# Patient Record
Sex: FEMALE | Race: WHITE | NOT HISPANIC OR LATINO | Employment: OTHER | URBAN - METROPOLITAN AREA
[De-identification: names, ages, dates, MRNs, and addresses within clinical notes are randomized per-mention and may not be internally consistent; named-entity substitution may affect disease eponyms.]

---

## 2017-01-06 ENCOUNTER — APPOINTMENT (OUTPATIENT)
Dept: GENERAL RADIOLOGY | Facility: HOSPITAL | Age: 72
End: 2017-01-06

## 2017-01-06 ENCOUNTER — OFFICE VISIT (OUTPATIENT)
Dept: FAMILY MEDICINE CLINIC | Facility: CLINIC | Age: 72
End: 2017-01-06

## 2017-01-06 ENCOUNTER — HOSPITAL ENCOUNTER (EMERGENCY)
Facility: HOSPITAL | Age: 72
Discharge: HOME | End: 2017-01-06
Attending: EMERGENCY MEDICINE | Admitting: EMERGENCY MEDICINE

## 2017-01-06 VITALS
BODY MASS INDEX: 27.99 KG/M2 | DIASTOLIC BLOOD PRESSURE: 64 MMHG | OXYGEN SATURATION: 100 % | RESPIRATION RATE: 16 BRPM | HEART RATE: 74 BPM | SYSTOLIC BLOOD PRESSURE: 155 MMHG | TEMPERATURE: 98 F | WEIGHT: 168 LBS | HEIGHT: 65 IN

## 2017-01-06 VITALS
DIASTOLIC BLOOD PRESSURE: 72 MMHG | OXYGEN SATURATION: 96 % | HEART RATE: 112 BPM | SYSTOLIC BLOOD PRESSURE: 120 MMHG | WEIGHT: 168 LBS | BODY MASS INDEX: 28.68 KG/M2 | HEIGHT: 64 IN | TEMPERATURE: 98.6 F

## 2017-01-06 DIAGNOSIS — E78.49 OTHER HYPERLIPIDEMIA: ICD-10-CM

## 2017-01-06 DIAGNOSIS — I10 ESSENTIAL HYPERTENSION: ICD-10-CM

## 2017-01-06 DIAGNOSIS — I48.91 NEW ONSET A-FIB (HCC): Primary | ICD-10-CM

## 2017-01-06 DIAGNOSIS — M79.7 FIBROMYALGIA: Primary | ICD-10-CM

## 2017-01-06 DIAGNOSIS — I48.91 ATRIAL FIBRILLATION WITH RAPID VENTRICULAR RESPONSE (HCC): ICD-10-CM

## 2017-01-06 LAB
ALBUMIN SERPL-MCNC: 4 G/DL (ref 3.5–5.2)
ALBUMIN/GLOB SERPL: 1.5 G/DL
ALP SERPL-CCNC: 110 U/L (ref 39–117)
ALT SERPL W P-5'-P-CCNC: 15 U/L (ref 1–33)
ANION GAP SERPL CALCULATED.3IONS-SCNC: 11.5 MMOL/L
APTT PPP: 32.3 SECONDS (ref 22.7–35.4)
AST SERPL-CCNC: 16 U/L (ref 1–32)
BASOPHILS # BLD AUTO: 0.05 10*3/MM3 (ref 0–0.2)
BASOPHILS NFR BLD AUTO: 0.6 % (ref 0–1.5)
BILIRUB SERPL-MCNC: 0.7 MG/DL (ref 0.1–1.2)
BUN BLD-MCNC: 12 MG/DL (ref 8–23)
BUN/CREAT SERPL: 11 (ref 7–25)
CALCIUM SPEC-SCNC: 9.4 MG/DL (ref 8.6–10.5)
CHLORIDE SERPL-SCNC: 103 MMOL/L (ref 98–107)
CO2 SERPL-SCNC: 25.5 MMOL/L (ref 22–29)
CREAT BLD-MCNC: 1.09 MG/DL (ref 0.57–1)
DEPRECATED RDW RBC AUTO: 46.8 FL (ref 37–54)
EOSINOPHIL # BLD AUTO: 0.18 10*3/MM3 (ref 0–0.7)
EOSINOPHIL NFR BLD AUTO: 2.2 % (ref 0.3–6.2)
ERYTHROCYTE [DISTWIDTH] IN BLOOD BY AUTOMATED COUNT: 13.6 % (ref 11.7–13)
GFR SERPL CREATININE-BSD FRML MDRD: 50 ML/MIN/1.73
GLOBULIN UR ELPH-MCNC: 2.7 GM/DL
GLUCOSE BLD-MCNC: 131 MG/DL (ref 65–99)
HCT VFR BLD AUTO: 46.4 % (ref 35.6–45.5)
HGB BLD-MCNC: 15.4 G/DL (ref 11.9–15.5)
HOLD SPECIMEN: NORMAL
HOLD SPECIMEN: NORMAL
IMM GRANULOCYTES # BLD: 0.02 10*3/MM3 (ref 0–0.03)
IMM GRANULOCYTES NFR BLD: 0.2 % (ref 0–0.5)
INR PPP: 1 (ref 0.9–1.1)
LYMPHOCYTES # BLD AUTO: 1.96 10*3/MM3 (ref 0.9–4.8)
LYMPHOCYTES NFR BLD AUTO: 24.1 % (ref 19.6–45.3)
MAGNESIUM SERPL-MCNC: 2 MG/DL (ref 1.6–2.4)
MCH RBC QN AUTO: 31.5 PG (ref 26.9–32)
MCHC RBC AUTO-ENTMCNC: 33.2 G/DL (ref 32.4–36.3)
MCV RBC AUTO: 94.9 FL (ref 80.5–98.2)
MONOCYTES # BLD AUTO: 0.43 10*3/MM3 (ref 0.2–1.2)
MONOCYTES NFR BLD AUTO: 5.3 % (ref 5–12)
NEUTROPHILS # BLD AUTO: 5.48 10*3/MM3 (ref 1.9–8.1)
NEUTROPHILS NFR BLD AUTO: 67.6 % (ref 42.7–76)
NT-PROBNP SERPL-MCNC: 849.5 PG/ML (ref 5–900)
PLATELET # BLD AUTO: 191 10*3/MM3 (ref 140–500)
PMV BLD AUTO: 11.2 FL (ref 6–12)
POTASSIUM BLD-SCNC: 4.5 MMOL/L (ref 3.5–5.2)
PROT SERPL-MCNC: 6.7 G/DL (ref 6–8.5)
PROTHROMBIN TIME: 12.8 SECONDS (ref 11.7–14.2)
RBC # BLD AUTO: 4.89 10*6/MM3 (ref 3.9–5.2)
SODIUM BLD-SCNC: 140 MMOL/L (ref 136–145)
T4 FREE SERPL-MCNC: 1.05 NG/DL (ref 0.93–1.7)
TROPONIN T SERPL-MCNC: <0.01 NG/ML (ref 0–0.03)
TSH SERPL DL<=0.05 MIU/L-ACNC: 0.92 MIU/ML (ref 0.27–4.2)
WBC NRBC COR # BLD: 8.12 10*3/MM3 (ref 4.5–10.7)
WHOLE BLOOD HOLD SPECIMEN: NORMAL
WHOLE BLOOD HOLD SPECIMEN: NORMAL

## 2017-01-06 PROCEDURE — 80053 COMPREHEN METABOLIC PANEL: CPT | Performed by: EMERGENCY MEDICINE

## 2017-01-06 PROCEDURE — 93005 ELECTROCARDIOGRAM TRACING: CPT

## 2017-01-06 PROCEDURE — 93000 ELECTROCARDIOGRAM COMPLETE: CPT | Performed by: PHYSICIAN ASSISTANT

## 2017-01-06 PROCEDURE — 83735 ASSAY OF MAGNESIUM: CPT | Performed by: EMERGENCY MEDICINE

## 2017-01-06 PROCEDURE — 83880 ASSAY OF NATRIURETIC PEPTIDE: CPT | Performed by: EMERGENCY MEDICINE

## 2017-01-06 PROCEDURE — 99284 EMERGENCY DEPT VISIT MOD MDM: CPT

## 2017-01-06 PROCEDURE — 85730 THROMBOPLASTIN TIME PARTIAL: CPT | Performed by: EMERGENCY MEDICINE

## 2017-01-06 PROCEDURE — 96372 THER/PROPH/DIAG INJ SC/IM: CPT

## 2017-01-06 PROCEDURE — 85610 PROTHROMBIN TIME: CPT | Performed by: EMERGENCY MEDICINE

## 2017-01-06 PROCEDURE — 71010 HC CHEST PA OR AP: CPT

## 2017-01-06 PROCEDURE — 93010 ELECTROCARDIOGRAM REPORT: CPT | Performed by: INTERNAL MEDICINE

## 2017-01-06 PROCEDURE — 84439 ASSAY OF FREE THYROXINE: CPT | Performed by: EMERGENCY MEDICINE

## 2017-01-06 PROCEDURE — 85025 COMPLETE CBC W/AUTO DIFF WBC: CPT | Performed by: EMERGENCY MEDICINE

## 2017-01-06 PROCEDURE — 96365 THER/PROPH/DIAG IV INF INIT: CPT

## 2017-01-06 PROCEDURE — 99214 OFFICE O/P EST MOD 30 MIN: CPT | Performed by: PHYSICIAN ASSISTANT

## 2017-01-06 PROCEDURE — 84443 ASSAY THYROID STIM HORMONE: CPT | Performed by: EMERGENCY MEDICINE

## 2017-01-06 PROCEDURE — 25010000002 ENOXAPARIN PER 10 MG: Performed by: EMERGENCY MEDICINE

## 2017-01-06 PROCEDURE — 84484 ASSAY OF TROPONIN QUANT: CPT | Performed by: EMERGENCY MEDICINE

## 2017-01-06 RX ORDER — PRAVASTATIN SODIUM 80 MG/1
80 TABLET ORAL DAILY
Qty: 30 TABLET | Refills: 11 | Status: SHIPPED | OUTPATIENT
Start: 2017-01-06 | End: 2017-08-04

## 2017-01-06 RX ORDER — SODIUM CHLORIDE 0.9 % (FLUSH) 0.9 %
10 SYRINGE (ML) INJECTION AS NEEDED
Status: DISCONTINUED | OUTPATIENT
Start: 2017-01-06 | End: 2017-01-06 | Stop reason: HOSPADM

## 2017-01-06 RX ORDER — METOPROLOL SUCCINATE 50 MG/1
50 TABLET, EXTENDED RELEASE ORAL DAILY
Qty: 30 TABLET | Refills: 0 | Status: SHIPPED | OUTPATIENT
Start: 2017-01-06 | End: 2017-02-02 | Stop reason: SDUPTHER

## 2017-01-06 RX ADMIN — ENOXAPARIN SODIUM 80 MG: 80 INJECTION SUBCUTANEOUS at 18:58

## 2017-01-06 RX ADMIN — DILTIAZEM HYDROCHLORIDE 5 MG/HR: 100 INJECTION, POWDER, LYOPHILIZED, FOR SOLUTION INTRAVENOUS at 18:58

## 2017-01-06 NOTE — ED NOTES
"Pt sent to ED from Yuki Pennington' office for abnormal EKG, denies CP, states \"I'm always soa\"     Alison Tang, PATRICK  01/06/17 3034    "

## 2017-01-06 NOTE — MR AVS SNAPSHOT
Miguelina Mueller   1/6/2017 3:30 PM   Office Visit    Provider:  Yuki Pennington PA-C   Department:  [facility]   Dept Phone:  [phone]                Your Full Care Plan              Where to Get Your Medications      These medications were sent to [facility] - [place], [place] - [address] [facility] - [phone]  - [phone]   [address] [facility], [facility] [address]     Phone:  [phone]     pravastatin 80 MG tablet            Your Updated Medication List          This list is accurate as of: 1/6/17  5:05 PM.  Always use your most recent med list.                DULoxetine 60 MG capsule   Commonly known as:  CYMBALTA   Take 1 capsule by mouth Daily. For anxiety and fibromyalgia       lisinopril 10 MG tablet   Commonly known as:  PRINIVIL,ZESTRIL   Take 1 tablet by mouth Daily. For BP       pravastatin 80 MG tablet   Commonly known as:  PRAVACHOL   Take 1 tablet by mouth Daily. For cholesterol       traMADol 50 MG tablet   Commonly known as:  ULTRAM   Take 1 tablet by mouth Every 6 (Six) Hours As Needed for moderate pain (4-6).       Vitamin D3 2000 UNITS capsule   Take one po daily               We Performed the Following     ECG 12 Lead       You Were Diagnosed With        Codes Comments    Fibromyalgia    -  Primary ICD-10-CM: M79.7  ICD-9-CM: 729.1     Essential hypertension     ICD-10-CM: I10  ICD-9-CM: 401.9     Other hyperlipidemia     ICD-10-CM: E78.4  ICD-9-CM: 272.4     Atrial fibrillation with rapid ventricular response     ICD-10-CM: I48.91  ICD-9-CM: 427.31       Instructions    Refer to ER now  I will send her by EMS or daughter  Warned patient that this medication can cause drowsiness and impair them operating machinery, including driving a car.  Caution is advised.         Patient Instructions History      MyChart Signup     [facility] [employer] allows you to send messages to your doctor, view your  "test results, renew your prescriptions, schedule appointments, and more. To sign up, go to [email or web address] and click on the Sign Up Now link in the New User? box. Enter your [employer] Activation Code exactly as it appears below along with the last four digits of your Social Security Number and your Date of Birth () to complete the sign-up process. If you do not sign up before the expiration date, you must request a new code.    [employer]fall Activation Code: [email or web address]  Expires: 2017  5:12 PM    If you have questions, you can email [email or web address] or call [phone] to talk to our [employer] staff. Remember, [employer]fall is NOT to be used for urgent needs. For medical emergencies, dial 911.               Other Info from Your Visit           Allergies     No Known Allergies      Reason for Visit     Hypertension           Vital Signs     Blood Pressure Pulse Temperature Height Weight Oxygen Saturation    120/72 (BP Location: Left arm, Patient Position: Sitting, Cuff Size: Large Adult) 112 98.6 °F (37 °C) (Oral) 64\" (162.6 cm) 168 lb (76.2 kg) 96%    Body Mass Index Smoking Status                28.84 kg/m2 Former Smoker          Problems and Diagnoses Noted     High blood pressure    Fibromyalgia    High cholesterol or triglycerides    Atrial fibrillation (irregular heartbeat)          "

## 2017-01-06 NOTE — ED NOTES
Pt reports going to primary care today for perscription renewal. Pt's MD sent her for further evaluation of new finding of atrial fib/flutter. Pt denies c/p or Soa.      Suly Castano RN  01/06/17 4891

## 2017-01-06 NOTE — PROGRESS NOTES
Subjective   Miguelina Mueller is a 71 y.o. female.     History of Present Illness   Miguelina Mueller 71 y.o. female who presents today for refill on Ultram for her Fibromyalgia  she has a history of   Patient Active Problem List   Diagnosis   • Anxiety   • Depression   • Fibromyalgia   • Hyperlipidemia   • Essential hypertension   • IFG (impaired fasting glucose)   • Renal insufficiency   • Shoulder bursitis   .    Lab Results   Component Value Date    GLUCOSE 90 07/23/2014    BUN 15 04/25/2016    CREATININE 1.07 (H) 04/25/2016    EGFRIFNONA 51 (L) 04/25/2016    EGFRIFAFRI 62 04/25/2016    BCR 14.0 04/25/2016    CO2 24.4 04/25/2016    CALCIUM 10.0 04/25/2016    PROTENTOTREF 6.8 04/25/2016    ALBUMIN 4.20 04/25/2016    LABIL2 1.6 04/25/2016    AST 18 04/25/2016    ALT 12 04/25/2016     She did BMP today and will update labs again in April    Blood pressure is controlled with medication.  No results found for: CHOL  Lab Results   Component Value Date    TRIG 158 (H) 04/25/2016     Lab Results   Component Value Date    HDL 56 04/25/2016     No results found for: LDLCALC  Lab Results   Component Value Date    LDL 93 04/25/2016     No results found for: HDLLDLRATIO  No components found for: CHOLHDL    I did listen to her and she has new onset Afib with rapid ventricular responses.  LCG cannot see her---too late to use acute clinic  Dr Dominguez and I discussed this.  She will have to proceed to ER.  She needs rate control under supervision.      ECG 12 Lead  Date/Time: 1/6/2017 4:00 PM  Performed by: HANNAH KUMARI  Authorized by: HANNAH KUMARI   Rhythm: atrial fibrillation and ventricular tachycardia  Rate: tachycardic  ST Segments: ST segments normal  T Waves: T waves normal  QRS axis: normal  Other: no other findings  Clinical impression: abnormal ECG  Comments: EKG Interpretation Report    Heart rate:    115 beats/min, OH interval:  ? msec, QRS duration:  70 msec  QTu:334 msec, QTc:  462 msec          The following portions  of the patient's history were reviewed and updated as appropriate: allergies, current medications, past family history, past medical history, past social history, past surgical history and problem list.    Review of Systems   Constitutional: Negative for activity change, appetite change and unexpected weight change.   HENT: Negative for nosebleeds and trouble swallowing.    Eyes: Negative for pain and visual disturbance.   Respiratory: Negative for chest tightness, shortness of breath and wheezing.    Cardiovascular: Negative for chest pain and palpitations.   Gastrointestinal: Negative for abdominal pain and blood in stool.   Endocrine: Negative.    Genitourinary: Negative for difficulty urinating and hematuria.   Musculoskeletal: Negative for joint swelling.   Skin: Negative for color change and rash.   Allergic/Immunologic: Negative.    Neurological: Negative for syncope and speech difficulty.   Hematological: Negative for adenopathy.   Psychiatric/Behavioral: Negative for agitation and confusion.   All other systems reviewed and are negative.      Objective   Physical Exam   Constitutional: She is oriented to person, place, and time. She appears well-developed and well-nourished. No distress.   HENT:   Head: Normocephalic and atraumatic.   Eyes: Conjunctivae and EOM are normal. Pupils are equal, round, and reactive to light. Right eye exhibits no discharge. Left eye exhibits no discharge. No scleral icterus.   Neck: Normal range of motion. Neck supple. No tracheal deviation present. No thyromegaly present.   Cardiovascular: Intact distal pulses and normal pulses.  Exam reveals no gallop.    No murmur heard.  Tachycardic and irreg   Pulmonary/Chest: Effort normal and breath sounds normal. No respiratory distress. She has no wheezes. She has no rales.   Musculoskeletal: Normal range of motion.   Neurological: She is alert and oriented to person, place, and time. She exhibits normal muscle tone. Coordination  normal.   Skin: Skin is warm. No rash noted. No erythema. No pallor.   Psychiatric: She has a normal mood and affect. Her behavior is normal. Judgment and thought content normal.   Nursing note and vitals reviewed.      Assessment/Plan   Miguelina was seen today for hypertension.    Diagnoses and all orders for this visit:    Fibromyalgia  -     traMADol (ULTRAM) 50 MG tablet; Take 1 tablet by mouth Every 6 (Six) Hours As Needed for moderate pain (4-6).    Essential hypertension    Other hyperlipidemia    Atrial fibrillation with rapid ventricular response  -     ECG 12 Lead    Other orders  -     pravastatin (PRAVACHOL) 80 MG tablet; Take 1 tablet by mouth Daily. For cholesterol

## 2017-01-06 NOTE — PATIENT INSTRUCTIONS
Refer to ER now  I will send her by EMS or daughter  Warned patient that this medication can cause drowsiness and impair them operating machinery, including driving a car.  Caution is advised.

## 2017-01-06 NOTE — ED PROVIDER NOTES
EMERGENCY DEPARTMENT ENCOUNTER    CHIEF COMPLAINT  Chief Complaint: abnormal EKG  History given by: patient  History limited by: nothing  Room Number: 19/19  PMD: Yuki Pennington PA-C      HPI:  Pt is a 71 y.o. female who presents from PMD (Dr. Pennington) for abnormal EKG (A-fib with RVR). Pt denies any acute symptoms. Pt reports chronic SOA , which has not changed recently. She denies fever, vomiting, diarrhea, and fever. She denies any hx of arrhthymias or similar episode in past. There are no other complaints at this time.    Duration:  EKG done earlier today  Location: PMD office  Quality: A-fib with RVR  Intensity/Severity: moderate  Associated Symptoms: none  Aggravating Factors: n/a  Alleviating Factors: n/a  Previous Episodes: no hx of arrhythmias  Treatment before arrival: EKG at Dr. Pennington' office (PMD)    PAST MEDICAL HISTORY  Active Ambulatory Problems     Diagnosis Date Noted   • Anxiety    • Depression    • Fibromyalgia    • Hyperlipidemia    • Essential hypertension    • IFG (impaired fasting glucose) 05/21/2014   • Renal insufficiency 06/04/2014   • Shoulder bursitis 05/19/2014     Resolved Ambulatory Problems     Diagnosis Date Noted   • No Resolved Ambulatory Problems     Past Medical History   Diagnosis Date   • ADD (attention deficit disorder)    • Encounter for eye exam    • Fibrocystic breast disease    • History of bone density study    • History of CT scan of abdomen 01/05/2011   • History of echocardiogram 09/29/2015   • History of exercise stress test 09/29/2015   • History of pelvic ultrasound 12/11/2009   • Hypertension    • Hypokalemia    • Postmenopausal    • Pulmonary nodule 10/2010   • Pyelonephritis 10/2010   • Sepsis 10/2010   • Thrombocytopenia 10/2010       PAST SURGICAL HISTORY  Past Surgical History   Procedure Laterality Date   • Rotator cuff repair Right 07/30/2014       FAMILY HISTORY  Family History   Problem Relation Age of Onset   • Heart disease Mother    • Thyroid disease  Mother    • Emphysema Father    • Cancer Brother    • Heart disease Brother        SOCIAL HISTORY  Social History     Social History   • Marital status:      Spouse name: N/A   • Number of children: N/A   • Years of education: N/A     Occupational History   • Not on file.     Social History Main Topics   • Smoking status: Former Smoker     Packs/day: 0.25   • Smokeless tobacco: Not on file   • Alcohol use Yes      Comment: rare   • Drug use: Not on file   • Sexual activity: Not on file     Other Topics Concern   • Not on file     Social History Narrative       ALLERGIES  Review of patient's allergies indicates no known allergies.    REVIEW OF SYSTEMS  Review of Systems   Constitutional: Negative for chills and fever.   HENT: Negative for sore throat.    Respiratory: Positive for shortness of breath (chronic).    Cardiovascular: Negative for chest pain.   Gastrointestinal: Negative for nausea and vomiting.   Genitourinary: Negative for dysuria.   Musculoskeletal: Negative for back pain.   Skin: Negative for rash.   Neurological: Negative for dizziness.   Psychiatric/Behavioral: The patient is not nervous/anxious.    All other systems reviewed and are negative.      PHYSICAL EXAM  ED Triage Vitals   Temp Heart Rate Resp BP SpO2   01/06/17 1701 01/06/17 1701 01/06/17 1701 01/06/17 1717 01/06/17 1701   98.4 °F (36.9 °C) 100 18 146/62 99 %      Temp src Heart Rate Source Patient Position BP Location FiO2 (%)   01/06/17 1701 01/06/17 1701 01/06/17 1717 01/06/17 1717 --   Tympanic Monitor Lying Left arm        Physical Exam   Constitutional: She is oriented to person, place, and time and well-developed, well-nourished, and in no distress. No distress.   HENT:   Head: Normocephalic and atraumatic.   Eyes: EOM are normal. Pupils are equal, round, and reactive to light.   Neck: Normal range of motion. Neck supple. No JVD present.   Cardiovascular: Normal heart sounds.  An irregularly irregular rhythm present.  Tachycardia present.    Pulmonary/Chest: Effort normal and breath sounds normal. No respiratory distress. She has no rales.   Abdominal: Soft. There is no tenderness. There is no rebound and no guarding.   Musculoskeletal: Normal range of motion. She exhibits no edema (no lower extremity edema ).   Neurological: She is alert and oriented to person, place, and time. She has normal sensation and normal strength.   Skin: Skin is warm and dry. No rash noted.   Psychiatric: Mood and affect normal.   Nursing note and vitals reviewed.      LAB RESULTS  Lab Results (last 24 hours)     Procedure Component Value Units Date/Time    CBC & Differential [[id number]] Collected:  01/06/17 1723    Specimen:  Blood Updated:  01/06/17 1736    Narrative:       The following orders were created for panel order CBC & Differential.  Procedure                               Abnormality         Status                     ---------                               -----------         ------                     CBC Auto Differential[[id number]]         Abnormal            Final result                 Please view results for these tests on the individual orders.    Comprehensive Metabolic Panel [[id number]]  (Abnormal) Collected:  01/06/17 1723    Specimen:  Blood Updated:  01/06/17 1753     Glucose 131 (H) mg/dL      BUN 12 mg/dL      Creatinine 1.09 (H) mg/dL      Sodium 140 mmol/L      Potassium 4.5 mmol/L      Chloride 103 mmol/L      CO2 25.5 mmol/L      Calcium 9.4 mg/dL      Total Protein 6.7 g/dL      Albumin 4.00 g/dL      ALT (SGPT) 15 U/L      AST (SGOT) 16 U/L      Alkaline Phosphatase 110 U/L      Total Bilirubin 0.7 mg/dL      eGFR Non African Amer 50 (L) mL/min/1.73      Globulin 2.7 gm/dL      A/G Ratio 1.5 g/dL      BUN/Creatinine Ratio 11.0      Anion Gap 11.5 mmol/L     Magnesium [[id number]]  (Normal) Collected:  01/06/17 1723    Specimen:  Blood Updated:  01/06/17 1753     Magnesium 2.0 mg/dL     Troponin [[id number]]  (Normal)  Collected:  01/06/17 1723    Specimen:  Blood Updated:  01/06/17 1753     Troponin T <0.010 ng/mL     Narrative:       Troponin T Reference Ranges:  Less than 0.03 ng/mL:    Negative for AMI  0.03 to 0.09 ng/mL:      Indeterminant for AMI  Greater than 0.09 ng/mL: Positive for AMI    CBC Auto Differential [[id number]]  (Abnormal) Collected:  01/06/17 1723    Specimen:  Blood Updated:  01/06/17 1736     WBC 8.12 10*3/mm3      RBC 4.89 10*6/mm3      Hemoglobin 15.4 g/dL      Hematocrit 46.4 (H) %      MCV 94.9 fL      MCH 31.5 pg      MCHC 33.2 g/dL      RDW 13.6 (H) %      RDW-SD 46.8 fl      MPV 11.2 fL      Platelets 191 10*3/mm3      Neutrophil % 67.6 %      Lymphocyte % 24.1 %      Monocyte % 5.3 %      Eosinophil % 2.2 %      Basophil % 0.6 %      Immature Grans % 0.2 %      Neutrophils, Absolute 5.48 10*3/mm3      Lymphocytes, Absolute 1.96 10*3/mm3      Monocytes, Absolute 0.43 10*3/mm3      Eosinophils, Absolute 0.18 10*3/mm3      Basophils, Absolute 0.05 10*3/mm3      Immature Grans, Absolute 0.02 10*3/mm3     BNP [[id number]]  (Normal) Collected:  01/06/17 1723    Specimen:  Blood Updated:  01/06/17 1819     proBNP 849.5 pg/mL     Narrative:       Among patients with dyspnea, NT-proBNP is highly sensitive for the detection of acute congestive heart failure. In addition NT-proBNP of <300 pg/ml effectively rules out acute congestive heart failure with 99% negative predictive value.    TSH [[id number]]  (Normal) Collected:  01/06/17 1723    Specimen:  Blood Updated:  01/06/17 1819     TSH 0.919 mIU/mL     T4, Free [[id number]]  (Normal) Collected:  01/06/17 1723    Specimen:  Blood Updated:  01/06/17 1819     Free T4 1.05 ng/dL     Protime-INR [[id number]]  (Normal) Collected:  01/06/17 1723    Specimen:  Blood Updated:  01/06/17 1827     Protime 12.8 Seconds      INR 1.00     aPTT [[id number]]  (Normal) Collected:  01/06/17 1723    Specimen:  Blood Updated:  01/06/17 1827     PTT 32.3 seconds           I ordered the  above labs and reviewed the results    RADIOLOGY  XR Chest 1 View   Preliminary Result   Negative chest x-ray.               I ordered the above noted radiological studies. Interpreted by radiologist. Reviewed by me in PACS.       PROCEDURES  Procedures    EKG  EKG time: 1704   Rhythm/Rate: A-fib, 105 BPM  QRS, axis: nml   ST and T waves: nml     Interpreted Contemporaneously by me, independently viewed  A-fib new compared to prior (7/23/14)      PROGRESS AND CONSULTS  ED Course     5:22 PM: Ordered troponin, magnesium blood work, CXR and EKG for further evaluation.    5:40 PM: Ordered BNP, APTT, and protime-INR for further evaluation. Ordered cardizem and Lovenox for heart rate    7:24 PM: Pt still in A-fib on monitor    7:30 PM:  BP: 116/87 HR: 88 Temp: 98.4 °F (36.9 °C) (Tympanic) O2 sat: 95% on RA  Rechecked pt. Pt is resting comfortably and appears in NAD. Notified pt that she will likely need to be admitted for further evaluation. D/w pt plan to consult cardiology     7:36 PM:  Discussed pt's case with Dr. Garcia (Cardiologist), who feels comfortable with patient going home on Toprol & Xarelto and following up in office    7:46 PM:  BP: 116/87 HR: 88 Temp: 98.4 °F (36.9 °C) (Tympanic) O2 sat: 93% on RA  Rechecked pt. Pt is resting comfortably and appears in NAD. Notified pt of consult with Dr. Garcia. D/w pt the risks/benefits of Eliquis versus Coumadin. Pt elects for Eliquis. Informed pt of plan for discharge with Toprolol and Eliquis along with office follow up with Dr. Garcia. Pt understands and agrees with the plan. All questions were answered.     MEDICAL DECISION MAKING  Results were reviewed/discussed with the patient and they were also made aware of online access. Pt also made aware that some labs, such as cultures, will not be resulted during ER visit and follow up with PMD is necessary.     MDM  Number of Diagnoses or Management Options  New onset a-fib:      Amount and/or Complexity of Data  Reviewed  Clinical lab tests: reviewed and ordered (Troponin=<0.01)  Tests in the radiology section of CPT®: reviewed and ordered (CXR is negative)  Tests in the medicine section of CPT®: reviewed and ordered (See EKG procedure note)  Review and summarize past medical records: yes (Pt visited Yuki Pennington PA-C earlier today. Pt had EKG shows new onset A-fib with RVR. Pt instructed to come to ED.)  Discuss the patient with other providers: yes (Dr. Garcia (Cardiologist))  Independent visualization of images, tracings, or specimens: yes           DIAGNOSIS  Final diagnoses:   New onset a-fib       DISPOSITION  DISCHARGE    Patient discharged in stable condition.    Reviewed implications of results, diagnosis, meds, responsibility to follow up, warning signs and symptoms of possible worsening, potential complications and reasons to return to ER.   Patient/Family voiced understanding of above instructions.    Discussed plan for discharge, as there is no emergent indication for admission.  Pt/family is agreeable and understands need for follow up and repeat testing.  Pt is aware that discharge does not mean that nothing is wrong but it indicates no emergency is present that requires admission and they must continue care with follow-up as given below or physician of their choice.     FOLLOW-UP  Nery Garcia MD  [address] [address]  [phone]    Call on 1/9/2017  to schedule an appointment         Medication List      New Prescriptions          metoprolol succinate XL 50 MG 24 hr tablet   Commonly known as:  TOPROL-XL   Take 1 tablet by mouth Daily. Take at bedtime       rivaroxaban 20 MG tablet   Commonly known as:  XARELTO   Take 1 tablet by mouth Daily.         Changed          Vitamin D3 2000 UNITS capsule   Take one po daily   What changed:    - how much to take  - how to take this  - when to take this  - additional instructions             Latest Documented Vital Signs:  As of 8:02  PM  BP- 116/87 HR- 88 Temp- 98.4 °F (36.9 °C) (Tympanic) O2 sat- 93%    --  Documentation assistance provided by kamila Zuniga for Dr. Mar.  Information recorded by the scribe was done at my direction and has been verified and validated by me.     Dameon Zuniga  01/06/17 2002       Bradford Mar MD  01/06/17 0687

## 2017-01-07 DIAGNOSIS — N28.9 RENAL INSUFFICIENCY: Primary | ICD-10-CM

## 2017-01-07 RX ORDER — TRAMADOL HYDROCHLORIDE 50 MG/1
50 TABLET ORAL EVERY 6 HOURS PRN
Qty: 120 TABLET | Refills: 2
Start: 2017-01-07 | End: 2017-03-27 | Stop reason: SDUPTHER

## 2017-01-07 NOTE — PROGRESS NOTES
Clinical Pharmacy Services: Medication History    Miguelina Mueller is a 71 y.o. female presenting to [facility] Emergency Department with chief complaint of: abnormal ECG      Past Medical History:  Past Medical History   Diagnosis Date   • ADD (attention deficit disorder)    • Anxiety    • Depression    • Encounter for eye exam      with dilation; never   • Fibrocystic breast disease    • Fibromyalgia    • History of bone density study      never   • History of CT scan of abdomen 01/05/2011     and pelvis; spasm in lower bowel   • History of echocardiogram 09/29/2015     diastolic dysfunction, grade 2   • History of exercise stress test 09/29/2015     neg   • History of pelvic ultrasound 12/11/2009     neg   • Hyperlipidemia    • Hypertension      benign essential   • Hypokalemia    • IFG (impaired fasting glucose) 05/21/2014   • Postmenopausal      natural   • Pulmonary nodule 10/2010     found incidentally on CT   • Pyelonephritis 10/2010     R kidney   • Renal insufficiency 06/04/2014   • Sepsis 10/2010     and bactermia secondary to acute pyelonephritis   • Shoulder bursitis 05/19/2014     right   • Thrombocytopenia 10/2010     due to septis but had resolved        No Known Allergies    Medication information was obtained from: Patient  Pharmacy and Phone Number: [facility] [phone]    Current Outpatient Medications:    Prior to Admission medications    Medication Sig Start Date End Date Taking? Authorizing Provider   Cholecalciferol (VITAMIN D3) 2000 UNITS capsule Take one po daily  Patient taking differently: Take 2,000 Units by mouth Daily. Take one po daily 10/12/16  Yes Yuki Pennington PA-C   DULoxetine (CYMBALTA) 60 MG capsule Take 1 capsule by mouth Daily. For anxiety and fibromyalgia 10/12/16  Yes Yuki Pennington PA-C   lisinopril (PRINIVIL,ZESTRIL) 10 MG tablet Take 1 tablet by mouth Daily. For BP 10/12/16  Yes Yuki Pennington PA-C   pravastatin (PRAVACHOL) 80 MG tablet Take 1 tablet by mouth Daily.  For cholesterol 1/6/17  Yes Yuki Pennington PA-C   traMADol (ULTRAM) 50 MG tablet Take 1 tablet by mouth Every 6 (Six) Hours As Needed for moderate pain (4-6). 10/13/16  Yes Anny Dominguez MD   metoprolol succinate XL (TOPROL-XL) 50 MG 24 hr tablet Take 1 tablet by mouth Daily. Take at bedtime 1/6/17   Bradford Mar MD   rivaroxaban (XARELTO) 20 MG tablet Take 1 tablet by mouth Daily. 1/6/17   Bradford Mar MD       This medication list is complete to the best of my knowledge as of 1/6/2017    Please call if questions.    Jacey Funk, pharmacy intern   PharmD Candidate 2017  Ascom Phone: [phone]

## 2017-01-08 NOTE — PROGRESS NOTES
I have reviewed the notes, assessments, and/or procedures performed by Yuki Pennington PA-C, I concur with her/his documentation of Miguelina Mueller.

## 2017-01-09 ENCOUNTER — TELEPHONE (OUTPATIENT)
Dept: FAMILY MEDICINE CLINIC | Facility: CLINIC | Age: 72
End: 2017-01-09

## 2017-01-09 ENCOUNTER — TELEPHONE (OUTPATIENT)
Dept: SOCIAL WORK | Facility: HOSPITAL | Age: 72
End: 2017-01-09

## 2017-01-09 NOTE — TELEPHONE ENCOUNTER
Patient seen you last week for A-Fib. You referred her to cardiology but Dr. Garcia can not see her until 1-. Is this ok?

## 2017-01-09 NOTE — TELEPHONE ENCOUNTER
ER F/U phone call: Pt states that she is doing well. Advised pt to take her b/p and heart rate daily, write it down and give to the cardiologist when she see them on 1-26-17. Discussed her new medications. No other questions or concerns voiced at this time. Carol Hankins RN

## 2017-01-12 ENCOUNTER — OFFICE VISIT (OUTPATIENT)
Dept: CARDIOLOGY | Facility: CLINIC | Age: 72
End: 2017-01-12

## 2017-01-12 VITALS
DIASTOLIC BLOOD PRESSURE: 80 MMHG | HEIGHT: 64 IN | BODY MASS INDEX: 28.68 KG/M2 | WEIGHT: 168 LBS | SYSTOLIC BLOOD PRESSURE: 120 MMHG | HEART RATE: 70 BPM

## 2017-01-12 DIAGNOSIS — R53.82 CHRONIC FATIGUE: ICD-10-CM

## 2017-01-12 DIAGNOSIS — I48.91 ATRIAL FIBRILLATION, UNSPECIFIED TYPE (HCC): Primary | ICD-10-CM

## 2017-01-12 DIAGNOSIS — I10 ESSENTIAL HYPERTENSION: ICD-10-CM

## 2017-01-12 DIAGNOSIS — R94.31 ABNORMAL EKG: ICD-10-CM

## 2017-01-12 PROCEDURE — 93000 ELECTROCARDIOGRAM COMPLETE: CPT | Performed by: NURSE PRACTITIONER

## 2017-01-12 PROCEDURE — 99214 OFFICE O/P EST MOD 30 MIN: CPT | Performed by: NURSE PRACTITIONER

## 2017-01-12 NOTE — PROGRESS NOTES
Date of Office Visit: 2017  Encounter Provider: ROXIE Fierro  Place of Service: [facility] CARDIOLOGY  Patient Name: Miguelina Mueller  :1945    Chief Complaint   Patient presents with   • Atrial Fibrillation   :     HPI: Miguelina Mueller is a 71 y.o. female who presents today for evaluation and treatment of new onset atrial fibrillation.  The patient was evaluated by Yuki Pennington PA-C on 2017 for follow-up of fibromyalgia.  She was noted to be in atrial fibrillation with rapid ventricular response with a heart rate of 115 bpm.  The patient was referred to go to the [facility] emergency department for further evaluation.  The patient reported to the ED physician chronic shortness of air which had not changed recently.  Upon review of the records, her comprehensive metabolic panel was normal except for an elevated glucose of 131 and creatinine of 1.09 (high).  Her magnesium level, troponin, TSH, and free T4 were within normal limits.  Her pro BNP was 849.5, which is considered to be within normal limits..  Chest x-ray interpreted as normal.  The patient was given IV Cardizem and Lovenox.  Her heart rate decreased to 88 bpm.  The plan of care was discussed with Dr. Nery Garcia who recommended the patient go home on Toprol XL 25 mg daily and Xarelto 20 mg daily.  She was then recommended to follow-up in our office for further evaluation.    The patient has a past medical history of anxiety, depression, and fibromyalgia.  She reports a history of essential hypertension, hyperlipidemia, and impaired fasting glucose - all well-controlled.  She also has a history of renal insufficiency.  Upon review of the patient's records, she had a treadmill exercise stress test completed at our office dated 2015 for evaluation of fatigue and shortness of air with the following results: Poor exercise capacity, negative EKG for ischemia, and Lomeli treadmill score of 2 which  is considered at moderate risk for coronary artery disease.  She also had a 2-D echocardiogram completed at the same time which showed the following: Ejection fraction 67%, grade 2 diastolic dysfunction, and no significant valvular stenosis or insufficiency.    The patient presents today for follow-up.  She is an established patient of Dr. Rajesh Dover and was last seen in September 2015.  The patient stopped her pravastatin at that time to see if it helped with muscle discomfort and it did not make a difference.  She is back on the pravastatin and doing fine.  She denies chest pain, shortness of air, paroxysmal nocturnal dyspnea, orthopnea, cough, palpitations, dizziness, or syncope.  She does have a tiny amount of lower extremity edema at times per the patient.  She often feels fatigued and this is unchanged.      Past Medical History   Diagnosis Date   • ADD (attention deficit disorder)    • Anxiety    • Depression    • Diastolic dysfunction      Grade 2 per 2-D echocardiogram 9/29/2015   • Encounter for eye exam      with dilation; never   • Fibrocystic breast disease    • Fibromyalgia    • History of bone density study      never   • History of CT scan of abdomen 01/05/2011     and pelvis; spasm in lower bowel   • History of echocardiogram 09/29/2015     diastolic dysfunction, grade 2   • History of exercise stress test 09/29/2015     neg   • History of pelvic ultrasound 12/11/2009     neg   • Hyperlipidemia    • Hypertension      benign essential   • Hypokalemia    • IFG (impaired fasting glucose) 05/21/2014   • Postmenopausal      natural   • Pulmonary nodule 10/2010     found incidentally on CT   • Pyelonephritis 10/2010     R kidney   • Renal insufficiency 06/04/2014   • Seasonal allergic rhinitis 5/14/2015   • Sepsis 10/2010     and bactermia secondary to acute pyelonephritis   • Shoulder bursitis 05/19/2014     right   • Thrombocytopenia 10/2010     due to septis but had resolved        Past Surgical  History   Procedure Laterality Date   • Rotator cuff repair Right 07/30/2014   • Colonoscopy         Social History     Social History   • Marital status:      Spouse name: N/A   • Number of children: N/A   • Years of education: N/A     Occupational History   • Not on file.     Social History Main Topics   • Smoking status: Former Smoker     Packs/day: 0.25   • Smokeless tobacco: Not on file      Comment: quit around 2007; caffeine use   • Alcohol use Yes      Comment: rare   • Drug use: No   • Sexual activity: Defer     Other Topics Concern   • Not on file     Social History Narrative       Family History   Problem Relation Age of Onset   • Heart disease Mother    • Thyroid disease Mother    • Emphysema Father    • Cancer Brother    • Heart disease Brother        Review of Systems   Constitution: Positive for malaise/fatigue. Negative for chills, diaphoresis, fever, night sweats, weight gain and weight loss.   HENT: Negative for hearing loss, nosebleeds, sore throat and tinnitus.    Eyes: Negative for blurred vision, double vision, pain and visual disturbance.   Cardiovascular: Positive for leg swelling. Negative for chest pain, claudication, cyanosis, dyspnea on exertion, irregular heartbeat, near-syncope, orthopnea, palpitations, paroxysmal nocturnal dyspnea and syncope.   Respiratory: Negative for cough, hemoptysis, shortness of breath, snoring and wheezing.    Endocrine: Negative for cold intolerance, heat intolerance and polyuria.   Hematologic/Lymphatic: Negative for bleeding problem. Does not bruise/bleed easily.   Skin: Negative for color change, dry skin, flushing and itching.   Musculoskeletal: Negative for falls, joint pain, joint swelling, muscle cramps, muscle weakness and myalgias.   Gastrointestinal: Negative for abdominal pain, constipation, heartburn, melena, nausea and vomiting.   Genitourinary: Negative for dysuria and hematuria.   Neurological: Negative for excessive daytime sleepiness,  "dizziness, light-headedness, loss of balance, numbness, paresthesias, seizures and vertigo.   Psychiatric/Behavioral: Negative for altered mental status, depression, memory loss and substance abuse. The patient does not have insomnia and is not nervous/anxious.    Allergic/Immunologic: Negative for environmental allergies.       No Known Allergies      Current Outpatient Prescriptions:   •  Cholecalciferol (VITAMIN D3) 2000 UNITS capsule, Take one po daily (Patient taking differently: Take 2,000 Units by mouth Daily. Take one po daily), Disp: 90 capsule, Rfl: 3  •  lisinopril (PRINIVIL,ZESTRIL) 10 MG tablet, Take 1 tablet by mouth Daily. For BP, Disp: 30 tablet, Rfl: 11  •  metoprolol succinate XL (TOPROL-XL) 50 MG 24 hr tablet, Take 1 tablet by mouth Daily. Take at bedtime, Disp: 30 tablet, Rfl: 0  •  pravastatin (PRAVACHOL) 80 MG tablet, Take 1 tablet by mouth Daily. For cholesterol, Disp: 30 tablet, Rfl: 11  •  rivaroxaban (XARELTO) 20 MG tablet, Take 1 tablet by mouth Daily., Disp: 30 tablet, Rfl: 0  •  traMADol (ULTRAM) 50 MG tablet, Take 1 tablet by mouth Every 6 (Six) Hours As Needed for moderate pain (4-6)., Disp: 120 tablet, Rfl: 2     Objective:     Vitals:    01/12/17 1045 01/12/17 1046   BP: 124/70 120/80   BP Location: Left arm Right arm   Pulse: 70    Weight: 168 lb (76.2 kg)    Height: 64\" (162.6 cm)      Body mass index is 28.84 kg/(m^2).    PHYSICAL EXAM:    Vitals Reviewed.   General Appearance: No acute distress, well developed and well nourished.   Eyes: Conjunctiva and lids: No erythema, swelling, or discharge. Sclera non-icteric.   HENT: Atraumatic, normocephalic. External eyes, ears, and nose normal. No hearing loss noted. Mucous membranes normal. Lips not cyanotic. Neck supple with no tenderness.  Respiratory: No signs of respiratory distress. Respiration rhythm and depth normal.   Clear to auscultation. No rales, crackles, rhonchi, or wheezing auscultated.   Cardiovascular:  Jugular Venous " Pressure: Normal  Heart Rate and Rhythm: Irregularly, irregular. Heart Sounds: Normal S1 and S2. No S3 or S4 noted.  Murmurs: No murmurs noted. No rubs, thrills, or gallops.   Arterial Pulses: Carotid pulses normal. No carotid bruit noted. Posterior tibialis and dorsalis pedis pulses normal.   Lower Extremities: No edema noted.  Gastrointestinal:  Abdomen soft, non-distended, non-tender. Normal bowel sounds. No hepatomegaly.   Musculoskeletal: Normal movement of extremities  Skin and Nails: General appearance normal. No pallor, cyanosis, diaphoresis. Skin temperature normal. No clubbing of fingernails.   Psychiatric: Patient alert and oriented to person, place, and time. Speech and behavior appropriate. Normal mood and affect.       ECG 12 Lead  Date/Time: 1/12/2017 11:36 AM  Performed by: KASIE COHEN  Authorized by: KASIE COHEN   Comparison: compared with previous ECG from 1/6/2017  Similar to previous ECG  Comparison to previous ECG:   Afib with   Rhythm: atrial fibrillation  Rate: normal  BPM: 70  Conduction: conduction normal  ST Segments: ST segments normal  T Waves: T waves normal  QRS axis: normal  Other: no other findings  Clinical impression: abnormal ECG              Assessment:       Diagnosis Plan   1. Atrial fibrillation, unspecified type  Adult Transthoracic Echo Complete    Holter Monitor - 24 Hour   2. Abnormal EKG  Adult Transthoracic Echo Complete    Holter Monitor - 24 Hour   3. Chronic fatigue  Adult Transthoracic Echo Complete    Holter Monitor - 24 Hour   4. Essential hypertension            Plan:       1. Atrial Fibrillation and Atrial Flutter  Assessment  • The patient has paroxysmal atrial fibrillation  • The patient's CHADS2-VASc score is 3  • A [id number]-QKTg score of 2 or more is considered a high risk for a thromboembolic event  • Rivaroxaban prescribed    Plan  • Continue in atrial fibrillation with rate control  • Continue rivaroxaban for antithrombotic therapy, bleeding  issues discussed  • Continue beta blocker for rhythm control    Subjective - Objective  •   The patient is asymptomatic at this time and doing fine.  We will continue with her current medication regimen.  I have recommended that she wear a 24-hour Holter monitor to make sure she is rate controlled and see if she still experiencing paroxysmal atrial fibrillation or if this is becoming more persistent.  She will remain on Xarelto for stroke prevention.  She denies any bleeding.  I have also recommended a 2-D echocardiogram to be completed to reassess her structural heart.    2.  Chronic Fatigue: The patient states this is unchanged.  3.  Essential Hypertension: Her blood pressure is well-controlled.    4.  Follow Up: I have recommended follow-up with Dr. Rajesh Dover in 4 weeks.      As always, it has been a pleasure to participate in your patient's care.      Sincerely,         ROXIE Wiggins

## 2017-01-12 NOTE — MR AVS SNAPSHOT
Miguelina Mueller   1/12/2017 10:40 AM   Office Visit    Dept Phone:  [phone]   Encounter #:  [id number]    Provider:  ROXIE Johnson   Department:  [facility] CARDIOLOGY                Your Full Care Plan              Today's Medication Changes          These changes are accurate as of: 1/12/17 11:59 AM.  If you have any questions, ask your nurse or doctor.               Stop taking medication(s)listed here:     DULoxetine 60 MG capsule   Commonly known as:  CYMBALTA   Stopped by:  ROXIE Johnson                      Your Updated Medication List          This list is accurate as of: 1/12/17 11:59 AM.  Always use your most recent med list.                lisinopril 10 MG tablet   Commonly known as:  PRINIVIL,ZESTRIL   Take 1 tablet by mouth Daily. For BP       metoprolol succinate XL 50 MG 24 hr tablet   Commonly known as:  TOPROL-XL   Take 1 tablet by mouth Daily. Take at bedtime       pravastatin 80 MG tablet   Commonly known as:  PRAVACHOL   Take 1 tablet by mouth Daily. For cholesterol       rivaroxaban 20 MG tablet   Commonly known as:  XARELTO   Take 1 tablet by mouth Daily.       traMADol 50 MG tablet   Commonly known as:  ULTRAM   Take 1 tablet by mouth Every 6 (Six) Hours As Needed for moderate pain (4-6).       Vitamin D3 2000 UNITS capsule   Take one po daily               We Performed the Following     Holter Monitor - 24 Hour       You Were Diagnosed With        Codes Comments    Atrial fibrillation, unspecified type    -  Primary ICD-10-CM: I48.91  ICD-9-CM: 427.31     Abnormal EKG     ICD-10-CM: R94.31  ICD-9-CM: 794.31     Chronic fatigue     ICD-10-CM: R53.82  ICD-9-CM: 780.79       Instructions     None    Patient Instructions History      Upcoming Appointments     Visit Type Date Time Department    HOSPITAL FOLLOW UP 1/12/2017 10:40 AM KYLE MCLAUGHLIN [facility] ANAMARIA ECHO 2D COMPLETE VT 1/20/2017  1:15 PM TIMOTHY MCLAUGHLIN ECHO    TIMOTHY CONRAD  HOLTER MONITOR 24 HOUR VT 2017  2:00 PM [facility] CARD HOLTERS    FOLLOW UP 2017  2:20 PM [facility] Signup     [facility] Certify allows you to send messages to your doctor, view your test results, renew your prescriptions, schedule appointments, and more. To sign up, go to [email or web address] and click on the Sign Up Now link in the New User? box. Enter your Certify Activation Code exactly as it appears below along with the last four digits of your Social Security Number and your Date of Birth () to complete the sign-up process. If you do not sign up before the expiration date, you must request a new code.    Certify Activation Code: [email or web address]  Expires: 2017  5:12 PM    If you have questions, you can email [email or web address] or call [phone] to talk to our Certify staff. Remember, Certify is NOT to be used for urgent needs. For medical emergencies, dial 911.               Other Info from Your Visit           Your Appointments     2017  1:15 PM EST   ECHOCARDIOGRAM 2D COMPLETE VISIT with ANAMARIA EUGENEG ECHO/VAS FRONT [facility] OUTPATIENT ECHOCARDIOGRAPHY ([place])    [address] [place], [place] [id number] [phone]           Bring your insurance cards with you. Wear comfortable clothing and shoes.            2017  2:00 PM EST   HOLTER MONITOR - 24 HOUR VISIT with ANAMARIA VALORIEG HOLTER   [facility] CARDIOLOGY ([facility])    [address] [place] [id number]               2017  2:20 PM EST   Follow Up with Rajesh Dover MD   [facility] CARDIOLOGY (--)    [address] [place] [id number] [phone]           Arrive 15 minutes prior to appointment.              Allergies     No Known Allergies      Reason for Visit     Atrial Fibrillation           Vital Signs     Blood Pressure Pulse Height Weight Body Mass Index Smoking  "Status    120/80 (BP Location: Right arm) 70 64\" (162.6 cm) 168 lb (76.2 kg) 28.84 kg/m2 Former Smoker      Problems and Diagnoses Noted     Seasonal allergic reaction    Atrial fibrillation (irregular heartbeat)    -  Primary    Abnormal EKG        Chronic fatigue            "

## 2017-01-20 ENCOUNTER — HOSPITAL ENCOUNTER (OUTPATIENT)
Dept: CARDIOLOGY | Facility: HOSPITAL | Age: 72
Discharge: HOME | End: 2017-01-20
Admitting: NURSE PRACTITIONER

## 2017-01-20 VITALS
HEART RATE: 66 BPM | HEIGHT: 64 IN | SYSTOLIC BLOOD PRESSURE: 124 MMHG | BODY MASS INDEX: 28.68 KG/M2 | WEIGHT: 168 LBS | DIASTOLIC BLOOD PRESSURE: 76 MMHG

## 2017-01-20 DIAGNOSIS — I48.91 ATRIAL FIBRILLATION, UNSPECIFIED TYPE (HCC): ICD-10-CM

## 2017-01-20 DIAGNOSIS — R94.31 ABNORMAL EKG: ICD-10-CM

## 2017-01-20 DIAGNOSIS — R53.82 CHRONIC FATIGUE: ICD-10-CM

## 2017-01-20 LAB
BH CV ECHO MEAS - ACS: 1.7 CM
BH CV ECHO MEAS - AO MAX PG (FULL): 2.6 MMHG
BH CV ECHO MEAS - AO MAX PG: 5.9 MMHG
BH CV ECHO MEAS - AO MEAN PG (FULL): 0.84 MMHG
BH CV ECHO MEAS - AO MEAN PG: 2.5 MMHG
BH CV ECHO MEAS - AO ROOT AREA (BSA CORRECTED): 1.5
BH CV ECHO MEAS - AO ROOT AREA: 5.8 CM^2
BH CV ECHO MEAS - AO ROOT DIAM: 2.7 CM
BH CV ECHO MEAS - AO V2 MAX: 122 CM/SEC
BH CV ECHO MEAS - AO V2 MEAN: 68.6 CM/SEC
BH CV ECHO MEAS - AO V2 VTI: 25.6 CM
BH CV ECHO MEAS - AVA(I,A): 1.8 CM^2
BH CV ECHO MEAS - AVA(I,D): 1.8 CM^2
BH CV ECHO MEAS - AVA(V,A): 1.5 CM^2
BH CV ECHO MEAS - AVA(V,D): 1.5 CM^2
BH CV ECHO MEAS - BSA(HAYCOCK): 1.9 M^2
BH CV ECHO MEAS - BSA: 1.8 M^2
BH CV ECHO MEAS - BZI_BMI: 28.8 KILOGRAMS/M^2
BH CV ECHO MEAS - BZI_METRIC_HEIGHT: 162.6 CM
BH CV ECHO MEAS - BZI_METRIC_WEIGHT: 76.2 KG
BH CV ECHO MEAS - CONTRAST EF (2CH): 66.7 ML/M^2
BH CV ECHO MEAS - CONTRAST EF 4CH: 65.3 ML/M^2
BH CV ECHO MEAS - EDV(MOD-SP2): 51 ML
BH CV ECHO MEAS - EDV(MOD-SP4): 49 ML
BH CV ECHO MEAS - EDV(TEICH): 94 ML
BH CV ECHO MEAS - EF(CUBED): 87.5 %
BH CV ECHO MEAS - EF(MOD-SP2): 66.7 %
BH CV ECHO MEAS - EF(MOD-SP4): 65.3 %
BH CV ECHO MEAS - EF(TEICH): 81.4 %
BH CV ECHO MEAS - ESV(MOD-SP2): 17 ML
BH CV ECHO MEAS - ESV(MOD-SP4): 17 ML
BH CV ECHO MEAS - ESV(TEICH): 17.5 ML
BH CV ECHO MEAS - FS: 50 %
BH CV ECHO MEAS - IVS/LVPW: 1.1
BH CV ECHO MEAS - IVSD: 1.1 CM
BH CV ECHO MEAS - LAT PEAK E' VEL: 9 CM/SEC
BH CV ECHO MEAS - LV DIASTOLIC VOL/BSA (35-75): 27 ML/M^2
BH CV ECHO MEAS - LV MASS(C)D: 158.9 GRAMS
BH CV ECHO MEAS - LV MASS(C)DI: 87.5 GRAMS/M^2
BH CV ECHO MEAS - LV MAX PG: 3.3 MMHG
BH CV ECHO MEAS - LV MEAN PG: 1.7 MMHG
BH CV ECHO MEAS - LV SYSTOLIC VOL/BSA (12-30): 9.4 ML/M^2
BH CV ECHO MEAS - LV V1 MAX: 91.2 CM/SEC
BH CV ECHO MEAS - LV V1 MEAN: 60 CM/SEC
BH CV ECHO MEAS - LV V1 VTI: 23.9 CM
BH CV ECHO MEAS - LVIDD: 4.5 CM
BH CV ECHO MEAS - LVIDS: 2.3 CM
BH CV ECHO MEAS - LVLD AP2: 6.2 CM
BH CV ECHO MEAS - LVLD AP4: 6.4 CM
BH CV ECHO MEAS - LVLS AP2: 5.9 CM
BH CV ECHO MEAS - LVLS AP4: 6 CM
BH CV ECHO MEAS - LVOT AREA (M): 2 CM^2
BH CV ECHO MEAS - LVOT AREA: 2 CM^2
BH CV ECHO MEAS - LVOT DIAM: 1.6 CM
BH CV ECHO MEAS - LVPWD: 0.96 CM
BH CV ECHO MEAS - MED PEAK E' VEL: 9 CM/SEC
BH CV ECHO MEAS - MR MAX PG: 67.7 MMHG
BH CV ECHO MEAS - MR MAX VEL: 411.5 CM/SEC
BH CV ECHO MEAS - MV DEC SLOPE: 535.7 CM/SEC^2
BH CV ECHO MEAS - MV DEC TIME: 0.18 SEC
BH CV ECHO MEAS - MV E MAX VEL: 107.1 CM/SEC
BH CV ECHO MEAS - MV MAX PG: 5.1 MMHG
BH CV ECHO MEAS - MV MEAN PG: 1.8 MMHG
BH CV ECHO MEAS - MV P1/2T MAX VEL: 101.6 CM/SEC
BH CV ECHO MEAS - MV P1/2T: 55.6 MSEC
BH CV ECHO MEAS - MV V2 MAX: 112.7 CM/SEC
BH CV ECHO MEAS - MV V2 MEAN: 60.6 CM/SEC
BH CV ECHO MEAS - MV V2 VTI: 28.8 CM
BH CV ECHO MEAS - MVA P1/2T LCG: 2.2 CM^2
BH CV ECHO MEAS - MVA(P1/2T): 4 CM^2
BH CV ECHO MEAS - MVA(VTI): 1.6 CM^2
BH CV ECHO MEAS - PA MAX PG (FULL): 2.1 MMHG
BH CV ECHO MEAS - PA MAX PG: 3.3 MMHG
BH CV ECHO MEAS - PA V2 MAX: 90.9 CM/SEC
BH CV ECHO MEAS - PULM A REVS DUR: 0.11 SEC
BH CV ECHO MEAS - PULM A REVS VEL: 24.7 CM/SEC
BH CV ECHO MEAS - PULM DIAS VEL: 56.3 CM/SEC
BH CV ECHO MEAS - PULM S/D: 0.78
BH CV ECHO MEAS - PULM SYS VEL: 44.1 CM/SEC
BH CV ECHO MEAS - PVA(V,A): 1 CM^2
BH CV ECHO MEAS - PVA(V,D): 1 CM^2
BH CV ECHO MEAS - QP/QS: 0.46
BH CV ECHO MEAS - RAP SYSTOLE: 3 MMHG
BH CV ECHO MEAS - RV MAX PG: 1.2 MMHG
BH CV ECHO MEAS - RV MEAN PG: 0.64 MMHG
BH CV ECHO MEAS - RV V1 MAX: 54.8 CM/SEC
BH CV ECHO MEAS - RV V1 MEAN: 37.1 CM/SEC
BH CV ECHO MEAS - RV V1 VTI: 12.7 CM
BH CV ECHO MEAS - RVOT AREA: 1.7 CM^2
BH CV ECHO MEAS - RVOT DIAM: 1.5 CM
BH CV ECHO MEAS - RVSP: 37.2 MMHG
BH CV ECHO MEAS - SI(AO): 82.2 ML/M^2
BH CV ECHO MEAS - SI(CUBED): 44.9 ML/M^2
BH CV ECHO MEAS - SI(LVOT): 26.1 ML/M^2
BH CV ECHO MEAS - SI(MOD-SP2): 18.7 ML/M^2
BH CV ECHO MEAS - SI(MOD-SP4): 17.6 ML/M^2
BH CV ECHO MEAS - SI(TEICH): 42.2 ML/M^2
BH CV ECHO MEAS - SUP REN AO DIAM: 1.6 CM
BH CV ECHO MEAS - SV(AO): 149.2 ML
BH CV ECHO MEAS - SV(CUBED): 81.5 ML
BH CV ECHO MEAS - SV(LVOT): 47.3 ML
BH CV ECHO MEAS - SV(MOD-SP2): 34 ML
BH CV ECHO MEAS - SV(MOD-SP4): 32 ML
BH CV ECHO MEAS - SV(RVOT): 21.5 ML
BH CV ECHO MEAS - SV(TEICH): 76.6 ML
BH CV ECHO MEAS - TAPSE (>1.6): 2.4 CM2
BH CV ECHO MEAS - TR MAX VEL: 292.4 CM/SEC
BH CV XLRA - RV BASE: 2.7 CM
BH CV XLRA - TDI S': 12 CM/SEC
E/E' RATIO: 11
LEFT ATRIUM VOLUME INDEX: 28 ML/M2
LEFT ATRIUM VOLUME: 50 CM3

## 2017-01-20 PROCEDURE — 93306 TTE W/DOPPLER COMPLETE: CPT | Performed by: INTERNAL MEDICINE

## 2017-01-20 PROCEDURE — 93306 TTE W/DOPPLER COMPLETE: CPT

## 2017-01-23 ENCOUNTER — TELEPHONE (OUTPATIENT)
Dept: CARDIOLOGY | Facility: CLINIC | Age: 72
End: 2017-01-23

## 2017-01-23 NOTE — TELEPHONE ENCOUNTER
Echocardiogram results:    · Calculated EF = 65.3%  · All left ventricular wall segments contract normally.  · Left ventricular diastolic dysfunction (grade III) consistent with reversible restrictive pattern.  · Left atrial cavity size is mild-to-moderately dilated.  · Mild mitral valve regurgitation is present  · Mild tricuspid valve regurgitation is present.  · Mild mitral valve regurgitation is present  · Mild tricuspid valve regurgitation is present. Estimated right ventricular systolic pressure from tricuspid regurgitation is mildly elevated (35-45 mmHg).      Holter results:    · An abnormal monitor study.  · 7 beat run of an atrial arrhythmia noted at 11:57 PM.  · The dominant rhythm normal sinus rhythm  · Premature atrial contractions occured frequently 15.3% of total beats were APC  · Average HR: 64. Min HR: 44. Max HR: 126.  · No ventricular arrhythmias noted  · The patient did not report palpitations    I will further discuss with cardiologist and follow up with the patient.

## 2017-01-24 NOTE — TELEPHONE ENCOUNTER
I discussed the patient's plan of care with Dr. Rajesh Dover today.  She was noted to have grade 3 diastolic dysfunction with reversible restrictive pattern.  Her blood pressure was well-controlled in the office. Dr. Dover has recommended medical treatment.    Upon review of her Holter monitor she was predominantly in a normal sinus rhythm and rate control. She was noted to have frequent premature atrial contractions. He has recommended that she continue with the Xarelto therapy as she was noted to have atrial fibrillation in the past.    The patient is scheduled to follow-up with him on 2/8/17.    I just returned the patient's phone call.  I explained to her both test results and she verbalizes understanding.      She said that the Xarelto's will cost her $300 on a monthly basis.  I will leave samples for her at our  to be picked up tomorrow.  When she comes in  February,  I want her to discuss the cost of the medication with Dr. Dover and further recommendations will be made at that time.

## 2017-01-26 NOTE — TELEPHONE ENCOUNTER
Miguelina Mueller called today and wanted to thank you so much for letting her have samples  Xalrelto. You don't have any idea how much that helped.

## 2017-02-02 ENCOUNTER — OFFICE VISIT (OUTPATIENT)
Dept: CARDIOLOGY | Facility: CLINIC | Age: 72
End: 2017-02-02

## 2017-02-02 VITALS
SYSTOLIC BLOOD PRESSURE: 104 MMHG | DIASTOLIC BLOOD PRESSURE: 62 MMHG | RESPIRATION RATE: 18 BRPM | WEIGHT: 172 LBS | HEIGHT: 64 IN | BODY MASS INDEX: 29.37 KG/M2 | HEART RATE: 94 BPM

## 2017-02-02 DIAGNOSIS — I48.0 PAF (PAROXYSMAL ATRIAL FIBRILLATION) (HCC): Primary | ICD-10-CM

## 2017-02-02 DIAGNOSIS — I10 ESSENTIAL HYPERTENSION: ICD-10-CM

## 2017-02-02 PROCEDURE — 93000 ELECTROCARDIOGRAM COMPLETE: CPT | Performed by: INTERNAL MEDICINE

## 2017-02-02 PROCEDURE — 99214 OFFICE O/P EST MOD 30 MIN: CPT | Performed by: INTERNAL MEDICINE

## 2017-02-02 RX ORDER — METOPROLOL SUCCINATE 50 MG/1
50 TABLET, EXTENDED RELEASE ORAL DAILY
Qty: 90 TABLET | Refills: 3 | Status: SHIPPED | OUTPATIENT
Start: 2017-02-02 | End: 2017-02-16 | Stop reason: SDUPTHER

## 2017-02-02 RX ORDER — DULOXETIN HYDROCHLORIDE 60 MG/1
60 CAPSULE, DELAYED RELEASE ORAL DAILY
COMMUNITY
End: 2017-09-13 | Stop reason: SDUPTHER

## 2017-02-02 NOTE — PROGRESS NOTES
Subjective:     Encounter Date:02/02/2017      Patient ID: Miguelina Mueller is a 71 y.o. female.    Chief Complaint:  Atrial Fibrillation   Presents for follow-up visit. Symptoms are negative for chest pain, dizziness, hypertension, hypotension, pacemaker problem, palpitations, shortness of breath, syncope, tachycardia and weakness. The symptoms have been stable. Past medical history includes atrial fibrillation.   Hypertension   This is a chronic problem. The current episode started more than 1 year ago. The problem is controlled. Pertinent negatives include no chest pain, palpitations or shortness of breath.   Patient resents today for reevaluation.  Clinically she is doing great no palpitations shortness of breath edema lightheadedness or chest discomfort.    Review of Systems   Constitution: Negative for weakness.   Cardiovascular: Negative for chest pain, palpitations and syncope.   Respiratory: Negative for shortness of breath.    Neurological: Negative for dizziness.   All other systems reviewed and are negative.        ECG 12 Lead  Date/Time: 2/2/2017 2:28 PM  Performed by: KISHORE RUEDA  Authorized by: KISHORE RUEDA   Comparison: compared with previous ECG from 1/12/2017  Similar to previous ECG  Rhythm: atrial fibrillation  Clinical impression: abnormal ECG               Objective:     Physical Exam   Constitutional: She is oriented to person, place, and time. She appears well-developed.   HENT:   Head: Normocephalic.   Eyes: Conjunctivae are normal.   Neck: Normal range of motion.   Cardiovascular: Normal rate and normal heart sounds.  An irregularly irregular rhythm present.   Pulmonary/Chest: Breath sounds normal.   Abdominal: Soft. Bowel sounds are normal.   Musculoskeletal: Normal range of motion. She exhibits no edema.   Neurological: She is alert and oriented to person, place, and time.   Skin: Skin is warm and dry.   Psychiatric: She has a normal mood and affect. Her behavior is normal.    Vitals reviewed.    · Calculated EF = 65.3%  · All left ventricular wall segments contract normally.  · Left ventricular diastolic dysfunction (grade III) consistent with reversible restrictive pattern.  · Left atrial cavity size is mild-to-moderately dilated.  · Mild mitral valve regurgitation is present  · Mild tricuspid valve regurgitation is present.  · Mild mitral valve regurgitation is present  · Mild tricuspid valve regurgitation is present. Estimated right ventricular systolic pressure from tricuspid regurgitation is mildly elevated (35-45 mmHg).  Lab Review:       Assessment:          Diagnosis Plan   1. PAF (paroxysmal atrial fibrillation)  ECG 12 Lead   2. Essential hypertension            Plan:       1.  Paroxysmal atrial fibrillation.  Unfortunate that patient is back in atrial fibrillation today.  This will remain she needs long-term anticoagulation.  2.  Mild mitral regurgitation.  3.  We did discuss options since she is completely asymptomatic and doing well recommend no further changes at this time.    Atrial Fibrillation and Atrial Flutter  Assessment  • The patient has paroxysmal atrial fibrillation  • The patient's CHADS2-VASc score is 3  • A [id number] score of 2 or more is considered a high risk for a thromboembolic event  • Rivaroxaban prescribed    Plan  • Continue in atrial fibrillation with rate control  • Continue beta blocker for rate control

## 2017-02-04 ENCOUNTER — RESULTS ENCOUNTER (OUTPATIENT)
Dept: FAMILY MEDICINE CLINIC | Facility: CLINIC | Age: 72
End: 2017-02-04

## 2017-02-04 DIAGNOSIS — N28.9 RENAL INSUFFICIENCY: ICD-10-CM

## 2017-02-16 RX ORDER — METOPROLOL SUCCINATE 50 MG/1
50 TABLET, EXTENDED RELEASE ORAL DAILY
Qty: 90 TABLET | Refills: 3 | Status: SHIPPED | OUTPATIENT
Start: 2017-02-16 | End: 2017-08-03

## 2017-03-15 ENCOUNTER — TELEPHONE (OUTPATIENT)
Dept: CARDIOLOGY | Facility: CLINIC | Age: 72
End: 2017-03-15

## 2017-03-27 ENCOUNTER — OFFICE VISIT (OUTPATIENT)
Dept: FAMILY MEDICINE CLINIC | Facility: CLINIC | Age: 72
End: 2017-03-27

## 2017-03-27 VITALS
HEIGHT: 64 IN | HEART RATE: 70 BPM | TEMPERATURE: 97.7 F | RESPIRATION RATE: 16 BRPM | WEIGHT: 172 LBS | BODY MASS INDEX: 29.37 KG/M2 | OXYGEN SATURATION: 97 % | DIASTOLIC BLOOD PRESSURE: 70 MMHG | SYSTOLIC BLOOD PRESSURE: 110 MMHG

## 2017-03-27 DIAGNOSIS — M79.7 FIBROMYALGIA: Primary | ICD-10-CM

## 2017-03-27 DIAGNOSIS — F41.9 ANXIETY: ICD-10-CM

## 2017-03-27 DIAGNOSIS — F34.1 DYSTHYMIA: ICD-10-CM

## 2017-03-27 PROCEDURE — 99213 OFFICE O/P EST LOW 20 MIN: CPT | Performed by: PHYSICIAN ASSISTANT

## 2017-03-27 RX ORDER — TRAMADOL HYDROCHLORIDE 50 MG/1
50 TABLET ORAL EVERY 6 HOURS PRN
Qty: 120 TABLET | Refills: 2
Start: 2017-03-27 | End: 2017-06-21 | Stop reason: SDUPTHER

## 2017-03-27 NOTE — PATIENT INSTRUCTIONS
Warned patient that this medication can cause drowsiness and impair them operating machinery, including driving a car.  Caution is advised.  Low glycemic index diet  Exercise 30 minutes most days of the week  Make sure you get results on any labs or tests we ordered today  We discussed medications and how to take them as prescribed  Sleep 6-8 hours each night if possible  If you have not signed up for [email or web address], please activate your code ASAP from your After Visit Summary today    LDL goal <100  LDL goal if heart disease <70  HDL goal >60  Triglyceride goal <150  BP goal =<130/80  Fasting glucose <100

## 2017-03-27 NOTE — PROGRESS NOTES
Subjective   Miguelina Mueller is a 71 y.o. female.     History of Present Illness   Miguelina Mueller 71 y.o. female who presents today for routine follow up check and medication refills.  she has a history of   Patient Active Problem List   Diagnosis   • Anxiety   • Depression   • Fibromyalgia   • Hyperlipidemia   • Essential hypertension   • IFG (impaired fasting glucose)   • Renal insufficiency   • Shoulder bursitis   • Seasonal allergic rhinitis   .  Since the last visit, she has overall felt tired.  She has Atrial Fibrillation and remains under the care of their cardiologist for medical management.  she has been compliant with current medications have reviewed them.  The patient denies medication side effects.    She is on cymbalta and Ultram for Fibro pain  The Toprol makes her tired and will talk to DR Dover    Miguelina Mueller female 71 y.o. who presents today for follow up of Depression and Anxiety.  She reports medication is working well, patient desires to continue on Rx, and needs refill. Onset of symptoms was approximately several years ago.  She denies current suicidal and homicidal ideation. Risk factors are lifestyle of multiple roles and chronic pain.  Previous treatment includes current Rx.  She complains of the following medication side effects: none.  The patient declines to go to counseling..  The patient has read and signed the [facility] Controlled Substance Contract.  I will continue to see patient for regular follow up appointments.  They are well controlled on their medication.  NATA has been reviewed by me and is updated every 3 months. The patient is aware of the potential for addiction and dependence.      The following portions of the patient's history were reviewed and updated as appropriate: allergies, current medications, past family history, past medical history, past social history, past surgical history and problem list.    Review of Systems   Constitutional: Positive for fatigue.  Negative for activity change, appetite change and unexpected weight change.   HENT: Negative for nosebleeds and trouble swallowing.    Eyes: Negative for pain and visual disturbance.   Respiratory: Negative for chest tightness, shortness of breath and wheezing.    Cardiovascular: Negative for chest pain and palpitations.   Gastrointestinal: Negative for abdominal pain and blood in stool.   Endocrine: Negative.    Genitourinary: Negative for difficulty urinating and hematuria.   Musculoskeletal: Positive for myalgias. Negative for joint swelling.   Skin: Negative for color change and rash.   Allergic/Immunologic: Negative.    Neurological: Negative for syncope and speech difficulty.   Hematological: Negative for adenopathy.   Psychiatric/Behavioral: Negative for agitation and confusion.   All other systems reviewed and are negative.      Objective   Physical Exam   Constitutional: She is oriented to person, place, and time. She appears well-developed and well-nourished. No distress.   HENT:   Head: Normocephalic.   Eyes: Conjunctivae and EOM are normal. Pupils are equal, round, and reactive to light.   Neck: Normal range of motion. Neck supple.   Cardiovascular: Normal rate, regular rhythm and normal heart sounds.    No murmur heard.  irreg irreg   Pulmonary/Chest: Effort normal and breath sounds normal.   Musculoskeletal: Normal range of motion.   Neurological: She is alert and oriented to person, place, and time.   Skin: Skin is warm and dry. No rash noted. She is not diaphoretic.   Psychiatric: Her behavior is normal. Judgment and thought content normal. Her affect is not inappropriate. She is not actively hallucinating. Cognition and memory are normal.   Nursing note and vitals reviewed.      Assessment/Plan   Problems Addressed this Visit        Musculoskeletal and Integument    Fibromyalgia - Primary    Relevant Medications    traMADol (ULTRAM) 50 MG tablet       Other    Anxiety    Depression                I  have reviewed the notes, assessments, and/or procedures performed by Yuki Pennington PA-C, I concur with her/his documentation of Miguelina Mueller.

## 2017-05-23 DIAGNOSIS — M79.7 FIBROMYALGIA: ICD-10-CM

## 2017-05-23 RX ORDER — TRAMADOL HYDROCHLORIDE 50 MG/1
TABLET ORAL
Qty: 120 TABLET | OUTPATIENT
Start: 2017-05-23

## 2017-06-21 ENCOUNTER — OFFICE VISIT (OUTPATIENT)
Dept: FAMILY MEDICINE CLINIC | Facility: CLINIC | Age: 72
End: 2017-06-21

## 2017-06-21 VITALS
HEART RATE: 88 BPM | BODY MASS INDEX: 29.53 KG/M2 | WEIGHT: 173 LBS | TEMPERATURE: 98.5 F | SYSTOLIC BLOOD PRESSURE: 110 MMHG | OXYGEN SATURATION: 98 % | HEIGHT: 64 IN | RESPIRATION RATE: 16 BRPM | DIASTOLIC BLOOD PRESSURE: 72 MMHG

## 2017-06-21 DIAGNOSIS — E55.9 VITAMIN D DEFICIENCY: ICD-10-CM

## 2017-06-21 DIAGNOSIS — I10 ESSENTIAL HYPERTENSION: ICD-10-CM

## 2017-06-21 DIAGNOSIS — F34.1 DYSTHYMIA: ICD-10-CM

## 2017-06-21 DIAGNOSIS — E78.49 OTHER HYPERLIPIDEMIA: ICD-10-CM

## 2017-06-21 DIAGNOSIS — M79.7 FIBROMYALGIA: Primary | ICD-10-CM

## 2017-06-21 DIAGNOSIS — F41.9 ANXIETY: ICD-10-CM

## 2017-06-21 DIAGNOSIS — N28.9 RENAL INSUFFICIENCY: ICD-10-CM

## 2017-06-21 PROCEDURE — G0438 PPPS, INITIAL VISIT: HCPCS | Performed by: PHYSICIAN ASSISTANT

## 2017-06-21 PROCEDURE — 99213 OFFICE O/P EST LOW 20 MIN: CPT | Performed by: PHYSICIAN ASSISTANT

## 2017-06-21 RX ORDER — TRAMADOL HYDROCHLORIDE 50 MG/1
50 TABLET ORAL EVERY 6 HOURS PRN
Qty: 120 TABLET | Refills: 2
Start: 2017-06-21 | End: 2017-09-13 | Stop reason: SDUPTHER

## 2017-06-21 NOTE — PATIENT INSTRUCTIONS
Low glycemic index diet  Exercise 30 minutes most days of the week  Make sure you get results on any labs or tests we ordered today  We discussed medications and how to take them as prescribed  Sleep 6-8 hours each night if possible  If you have not signed up for [employer], please activate your code ASAP from your After Visit Summary today    LDL goal <100  LDL goal if heart disease <70  HDL goal >60  Triglyceride goal <150  BP goal =<130/80  Fasting glucose <100    Warned patient that this medication can cause drowsiness and impair them operating machinery, including driving a car.  Caution is advised.  Contact office if your depression worsens   Go to ER if start to develop feelings for suicide  Take medication as prescribed  If you are having trouble tolerating your medication, contact office before abruptly stopping it    Fasting labs      Exercising to Lose Weight  Exercising can help you to lose weight. In order to lose weight through exercise, you need to do vigorous-intensity exercise. You can tell that you are exercising with vigorous intensity if you are breathing very hard and fast and cannot hold a conversation while exercising.  Moderate-intensity exercise helps to maintain your current weight. You can tell that you are exercising at a moderate level if you have a higher heart rate and faster breathing, but you are still able to hold a conversation.  HOW OFTEN SHOULD I EXERCISE?  Choose an activity that you enjoy and set realistic goals. Your health care provider can help you to make an activity plan that works for you. Exercise regularly as directed by your health care provider. This may include:  · Doing resistance training twice each week, such as:    Push-ups.    Sit-ups.    Lifting weights.    Using resistance bands.  · Doing a given intensity of exercise for a given amount of time. Choose from these options:    150 minutes of moderate-intensity exercise every week.    75 minutes of  vigorous-intensity exercise every week.    A mix of moderate-intensity and vigorous-intensity exercise every week.  Children, pregnant women, people who are out of shape, people who are overweight, and older adults may need to consult a health care provider for individual recommendations. If you have any sort of medical condition, be sure to consult your health care provider before starting a new exercise program.  WHAT ARE SOME ACTIVITIES THAT CAN HELP ME TO LOSE WEIGHT?   · Walking at a rate of at least 4.5 miles an hour.  · Jogging or running at a rate of 5 miles per hour.  · Biking at a rate of at least 10 miles per hour.  · Lap swimming.  · Roller-skating or in-line skating.  · Cross-country skiing.  · Vigorous competitive sports, such as football, basketball, and soccer.  · Jumping rope.  · Aerobic dancing.  HOW CAN I BE MORE ACTIVE IN MY DAY-TO-DAY ACTIVITIES?  · Use the stairs instead of the elevator.  · Take a walk during your lunch break.  · If you drive, park your car farther away from work or school.  · If you take public transportation, get off one stop early and walk the rest of the way.  · Make all of your phone calls while standing up and walking around.  · Get up, stretch, and walk around every 30 minutes throughout the day.  WHAT GUIDELINES SHOULD I FOLLOW WHILE EXERCISING?  · Do not exercise so much that you hurt yourself, feel dizzy, or get very short of breath.  · Consult your health care provider prior to starting a new exercise program.  · Wear comfortable clothes and shoes with good support.  · Drink plenty of water while you exercise to prevent dehydration or heat stroke. Body water is lost during exercise and must be replaced.  · Work out until you breathe faster and your heart beats faster.     This information is not intended to replace advice given to you by your health care provider. Make sure you discuss any questions you have with your health care provider.     Document Released:  01/20/2012 Document Revised: 01/08/2016 Document Reviewed: 05/21/2015  [employer] Patient Education ©2017 [employer]    Fall Prevention in the Home   Falls can cause injuries and can affect people from all age groups. There are many simple things that you can do to make your home safe and to help prevent falls.  WHAT CAN I DO ON THE OUTSIDE OF MY HOME?  · Regularly repair the edges of walkways and driveways and fix any cracks.  · Remove high doorway thresholds.  · Trim any shrubbery on the main path into your home.  · Use bright outdoor lighting.  · Clear walkways of debris and clutter, including tools and rocks.  · Regularly check that handrails are securely fastened and in good repair. Both sides of any steps should have handrails.  · Install guardrails along the edges of any raised decks or porches.  · Have leaves, snow, and ice cleared regularly.  · Use sand or salt on walkways during winter months.  · In the garage, clean up any spills right away, including grease or oil spills.  WHAT CAN I DO IN THE BATHROOM?  · Use night lights.  · Install grab bars by the toilet and in the tub and shower. Do not use towel bars as grab bars.  · Use non-skid mats or decals on the floor of the tub or shower.  · If you need to sit down while you are in the shower, use a plastic, non-slip stool.  · Keep the floor dry. Immediately clean up any water that spills on the floor.  · Remove soap buildup in the tub or shower on a regular basis.  · Attach bath mats securely with double-sided non-slip rug tape.  · Remove throw rugs and other tripping hazards from the floor.  WHAT CAN I DO IN THE BEDROOM?  · Use night lights.  · Make sure that a bedside light is easy to reach.  · Do not use oversized bedding that drapes onto the floor.  · Have a firm chair that has side arms to use for getting dressed.  · Remove throw rugs and other tripping hazards from the floor.  WHAT CAN I DO IN THE KITCHEN?   · Clean up any spills right  away.  · Avoid walking on wet floors.  · Place frequently used items in easy-to-reach places.  · If you need to reach for something above you, use a sturdy step stool that has a grab bar.  · Keep electrical cables out of the way.  · Do not use floor polish or wax that makes floors slippery. If you have to use wax, make sure that it is non-skid floor wax.  · Remove throw rugs and other tripping hazards from the floor.  WHAT CAN I DO IN THE STAIRWAYS?  · Do not leave any items on the stairs.  · Make sure that there are handrails on both sides of the stairs. Fix handrails that are broken or loose. Make sure that handrails are as long as the stairways.  · Check any carpeting to make sure that it is firmly attached to the stairs. Fix any carpet that is loose or worn.  · Avoid having throw rugs at the top or bottom of stairways, or secure the rugs with carpet tape to prevent them from moving.  · Make sure that you have a light switch at the top of the stairs and the bottom of the stairs. If you do not have them, have them installed.  WHAT ARE SOME OTHER FALL PREVENTION TIPS?  · Wear closed-toe shoes that fit well and support your feet. Wear shoes that have rubber soles or low heels.  · When you use a stepladder, make sure that it is completely opened and that the sides are firmly locked. Have someone hold the ladder while you are using it. Do not climb a closed stepladder.  · Add color or contrast paint or tape to grab bars and handrails in your home. Place contrasting color strips on the first and last steps.  · Use mobility aids as needed, such as canes, walkers, scooters, and crutches.  · Turn on lights if it is dark. Replace any light bulbs that burn out.  · Set up furniture so that there are clear paths. Keep the furniture in the same spot.  · Fix any uneven floor surfaces.  · Choose a carpet design that does not hide the edge of steps of a stairway.  · Be aware of any and all pets.  · Review your medicines with your  healthcare provider. Some medicines can cause dizziness or changes in blood pressure, which increase your risk of falling.  Talk with your health care provider about other ways that you can decrease your risk of falls. This may include working with a physical therapist or  to improve your strength, balance, and endurance.     This information is not intended to replace advice given to you by your health care provider. Make sure you discuss any questions you have with your health care provider.     Document Released: 12/08/2003 Document Revised: 04/10/2017 Document Reviewed: 01/22/2016  [employer] Interactive Patient Education ©2017 [employer]

## 2017-06-21 NOTE — PROGRESS NOTES
QUICK REFERENCE INFORMATION:  The ABCs of the Annual Wellness Visit    Subsequent Medicare Wellness Visit    HEALTH RISK ASSESSMENT    1945    Recent Hospitalizations:  No hospitalization(s) within the last year..        Current Medical Providers:  Patient Care Team:  Yuki Pennington PA-C as PCP - General  Yuki Pennington PA-C as PCP - Family Medicine  Rajesh Dover MD as Consulting Physician (Cardiology)        Smoking Status:  History   Smoking Status   • Former Smoker   • Packs/day: 0.25   Smokeless Tobacco   • Never Used     Comment: quit around 2007; caffeine use       Alcohol Consumption:  History   Alcohol Use   • Yes     Comment: rare       Depression Screen:   PHQ-9 Depression Screening 6/21/2017   Little interest or pleasure in doing things 0   Feeling down, depressed, or hopeless 0   PHQ-9 Total Score 0       Health Habits and Functional and Cognitive Screening:  Functional & Cognitive Status 6/21/2017   Do you have difficulty preparing food and eating? No   Do you have difficulty bathing yourself? No   Do you have difficulty getting dressed? No   Do you have difficulty using the toilet? No   Do you have difficulty moving around from place to place? No   In the past year have you fallen or experienced a near fall? No   Do you need help using the phone?  No   Are you deaf or do you have serious difficulty hearing?  No   Do you need help with transportation? No   Do you need help shopping? No   Do you need help preparing meals?  No   Do you need help with housework?  No   Do you need help with laundry? No   Do you need help taking your medications? No   Do you need help managing money? No   Do you have difficulty concentrating, remembering or making decisions? No       Health Habits  Current Diet: Well Balanced Diet  Dental Exam: Up to date  Eye Exam: Up to date  Exercise (times per week): 7 times per week  Current Exercise Activities Include: Walking      Does the patient have evidence of cognitive  impairment? No    Aspirin use counseling: Contraindicated from taking ASA      Recent Lab Results:  CMP:  Lab Results   Component Value Date    GLU 95 01/06/2017    BUN 12 01/06/2017    CREATININE 1.09 (H) 01/06/2017    EGFRIFNONA 50 (L) 01/06/2017    EGFRIFAFRI 57 (L) 01/06/2017    BCR 11.0 01/06/2017     01/06/2017    K 4.5 01/06/2017    CO2 25.5 01/06/2017    CALCIUM 9.4 01/06/2017    PROTENTOTREF 6.8 04/25/2016    ALBUMIN 4.00 01/06/2017    LABGLOBREF 2.6 04/25/2016    LABIL2 1.5 01/06/2017    BILITOT 0.7 01/06/2017    ALKPHOS 110 01/06/2017    AST 16 01/06/2017    ALT 15 01/06/2017     Lipid Panel:  Lab Results   Component Value Date    TRIG 158 (H) 04/25/2016    HDL 56 04/25/2016    VLDL 31.6 04/25/2016     HbA1c:  Lab Results   Component Value Date    HGBA1C 5.40 04/25/2016       Visual Acuity:  No exam data present    Age-appropriate Screening Schedule:  Refer to the list below for future screening recommendations based on patient's age, sex and/or medical conditions. Orders for these recommended tests are listed in the plan section. The patient has been provided with a written plan.    Health Maintenance   Topic Date Due   • LIPID PANEL  04/25/2017   • INFLUENZA VACCINE  08/01/2017   • MAMMOGRAM  09/11/2017   • DXA SCAN  07/19/2018   • COLONOSCOPY  02/09/2021   • TDAP/TD VACCINES (2 - Td) 10/12/2026   • PNEUMOCOCCAL VACCINES (65+ LOW/MEDIUM RISK)  Completed   • ZOSTER VACCINE  Addressed        Subjective   History of Present Illness    Miguelina Mueller is a 72 y.o. female who presents for an Subsequent Wellness Visit.    The following portions of the patient's history were reviewed and updated as appropriate: allergies, current medications, past family history, past medical history, past social history, past surgical history and problem list.    Outpatient Medications Prior to Visit   Medication Sig Dispense Refill   • Cholecalciferol (VITAMIN D3) 2000 UNITS capsule Take one po daily (Patient taking  "differently: Take 2,000 Units by mouth Daily. Take one po daily) 90 capsule 3   • DULoxetine (CYMBALTA) 60 MG capsule Take 60 mg by mouth Daily.     • lisinopril (PRINIVIL,ZESTRIL) 10 MG tablet Take 1 tablet by mouth Daily. For BP 30 tablet 11   • metoprolol succinate XL (TOPROL-XL) 50 MG 24 hr tablet Take 1 tablet by mouth Daily. Take at bedtime 90 tablet 3   • pravastatin (PRAVACHOL) 80 MG tablet Take 1 tablet by mouth Daily. For cholesterol 30 tablet 11   • rivaroxaban (XARELTO) 20 MG tablet Take 1 tablet by mouth Daily. Samples given Lot #, exp. 3-19 5 bottles. Left at . Patient called.DG 90 tablet 1   • traMADol (ULTRAM) 50 MG tablet Take 1 tablet by mouth Every 6 (Six) Hours As Needed for Moderate Pain (4-6). 120 tablet 2     No facility-administered medications prior to visit.        Patient Active Problem List   Diagnosis   • Anxiety   • Depression   • Fibromyalgia   • Hyperlipidemia   • Essential hypertension   • IFG (impaired fasting glucose)   • Renal insufficiency   • Shoulder bursitis   • Seasonal allergic rhinitis       Advance Care Planning:  has NO advance directive - add't info requested. Referral to ACP.    Identification of Risk Factors:  Risk factors include: inactivity and chronic pain.    Review of Systems    Compared to one year ago, the patient feels her physical health is the same.  Compared to one year ago, the patient feels her mental health is the same.    Objective     Physical Exam    Vitals:    06/21/17 1545   BP: 110/72   BP Location: Left arm   Patient Position: Sitting   Cuff Size: Large Adult   Pulse: 88   Resp: 16   Temp: 98.5 °F (36.9 °C)   TempSrc: Oral   SpO2: 98%   Weight: 173 lb (78.5 kg)   Height: 64\" (162.6 cm)   PainSc: 0-No pain       Body mass index is 29.7 kg/(m^2).  Discussed the patient's BMI with her. The BMI is above average; BMI management plan is completed.    Assessment/Plan   Patient Self-Management and Personalized Health Advice  The patient has " been provided with information about: diet, exercise, weight management and designing advance directives and preventive services including:   · Advance directive, Exercise counseling provided.    Visit Diagnoses:    ICD-10-CM ICD-9-CM   1. Fibromyalgia M79.7 729.1   2. Anxiety F41.9 300.00   3. Dysthymia F34.1 300.4   4. Renal insufficiency N28.9 593.9   5. Essential hypertension I10 401.9   6. Other hyperlipidemia E78.4 272.4       No orders of the defined types were placed in this encounter.      Outpatient Encounter Prescriptions as of 6/21/2017   Medication Sig Dispense Refill   • Cholecalciferol (VITAMIN D3) 2000 UNITS capsule Take one po daily (Patient taking differently: Take 2,000 Units by mouth Daily. Take one po daily) 90 capsule 3   • DULoxetine (CYMBALTA) 60 MG capsule Take 60 mg by mouth Daily.     • lisinopril (PRINIVIL,ZESTRIL) 10 MG tablet Take 1 tablet by mouth Daily. For BP 30 tablet 11   • metoprolol succinate XL (TOPROL-XL) 50 MG 24 hr tablet Take 1 tablet by mouth Daily. Take at bedtime 90 tablet 3   • pravastatin (PRAVACHOL) 80 MG tablet Take 1 tablet by mouth Daily. For cholesterol 30 tablet 11   • rivaroxaban (XARELTO) 20 MG tablet Take 1 tablet by mouth Daily. Samples given Lot #, exp. 3-19 5 bottles. Left at . Patient called.DG 90 tablet 1   • traMADol (ULTRAM) 50 MG tablet Take 1 tablet by mouth Every 6 (Six) Hours As Needed for Moderate Pain (4-6). 120 tablet 2     No facility-administered encounter medications on file as of 6/21/2017.        Reviewed use of high risk medication in the elderly: yes  Reviewed for potential of harmful drug interactions in the elderly: yes    Follow Up:  No Follow-up on file.     An After Visit Summary and PPPS with all of these plans were given to the patient.

## 2017-06-21 NOTE — PROGRESS NOTES
Subjective   Miguelina Mueller is a 72 y.o. female.     History of Present Illness   Miguelina Mueller female 72 y.o. who presents today for follow up of Depression and Anxiety.  She reports this is her best med and still gets depressed. Onset of symptoms was approximately several years ago.  She denies current suicidal and homicidal ideation. Risk factors are chronic pain.  Previous treatment includes current Rx.  She complains of the following medication side effects: none.  The patient declines to go to counseling..    No family or personal history of addiction.    Miguelina Mueller 72 y.o. female who presents today for routine follow up check and medication refills.  she has a history of   Patient Active Problem List   Diagnosis   • Anxiety   • Depression   • Fibromyalgia   • Hyperlipidemia   • Essential hypertension   • IFG (impaired fasting glucose)   • Renal insufficiency   • Shoulder bursitis   • Seasonal allergic rhinitis   .  Since the last visit, she has overall felt tired.  She has Hypertenision and is well controlled on medication, Hyperlipidemia and is well controlled on medication and Atrial Fibrillation and remains under the care of their cardiologist for medical management.  she has been compliant with current medications have reviewed them.  The patient denies medication side effects.    She is on ultram and cymbalta for fibro pain    The following portions of the patient's history were reviewed and updated as appropriate: allergies, current medications, past family history, past medical history, past social history, past surgical history and problem list.    Review of Systems   Constitutional: Positive for fatigue. Negative for activity change, appetite change and unexpected weight change.   HENT: Negative for nosebleeds and trouble swallowing.    Eyes: Negative for pain and visual disturbance.   Respiratory: Negative for chest tightness, shortness of breath and wheezing.    Cardiovascular: Negative for chest  pain and palpitations.   Gastrointestinal: Negative for abdominal pain and blood in stool.   Endocrine: Negative.    Genitourinary: Negative for difficulty urinating and hematuria.   Musculoskeletal: Positive for myalgias. Negative for joint swelling.   Skin: Negative for color change and rash.   Allergic/Immunologic: Negative.    Neurological: Negative for syncope and speech difficulty.   Hematological: Negative for adenopathy.   Psychiatric/Behavioral: Positive for dysphoric mood. Negative for agitation and confusion.   All other systems reviewed and are negative.      Objective   Physical Exam   Constitutional: She is oriented to person, place, and time. She appears well-developed and well-nourished. No distress.   HENT:   Head: Normocephalic.   Eyes: Conjunctivae and EOM are normal. Pupils are equal, round, and reactive to light.   Neck: Normal range of motion. Neck supple.   Cardiovascular: Normal rate, regular rhythm and normal heart sounds.    No murmur heard.  irreg irreg   Pulmonary/Chest: Effort normal and breath sounds normal.   Musculoskeletal: Normal range of motion.   Neurological: She is alert and oriented to person, place, and time.   Skin: Skin is warm and dry. No rash noted. She is not diaphoretic.   Psychiatric: Her behavior is normal. Judgment and thought content normal. Her affect is not inappropriate. She is not actively hallucinating. Cognition and memory are normal.   Nursing note and vitals reviewed.      Assessment/Plan   Problems Addressed this Visit        Cardiovascular and Mediastinum    Hyperlipidemia    Essential hypertension       Musculoskeletal and Integument    Fibromyalgia - Primary       Genitourinary    Renal insufficiency       Other    Anxiety    Depression

## 2017-06-22 RX ORDER — RIVAROXABAN 20 MG/1
TABLET, FILM COATED ORAL
Qty: 90 TABLET | Refills: 3 | Status: SHIPPED | OUTPATIENT
Start: 2017-06-22 | End: 2017-09-21 | Stop reason: SDUPTHER

## 2017-08-03 ENCOUNTER — OFFICE VISIT (OUTPATIENT)
Dept: CARDIOLOGY | Facility: CLINIC | Age: 72
End: 2017-08-03

## 2017-08-03 VITALS
WEIGHT: 172 LBS | SYSTOLIC BLOOD PRESSURE: 124 MMHG | DIASTOLIC BLOOD PRESSURE: 74 MMHG | HEIGHT: 64 IN | BODY MASS INDEX: 29.37 KG/M2 | HEART RATE: 108 BPM

## 2017-08-03 DIAGNOSIS — I48.0 PAROXYSMAL ATRIAL FIBRILLATION (HCC): Primary | ICD-10-CM

## 2017-08-03 PROCEDURE — 93000 ELECTROCARDIOGRAM COMPLETE: CPT | Performed by: INTERNAL MEDICINE

## 2017-08-03 PROCEDURE — 99214 OFFICE O/P EST MOD 30 MIN: CPT | Performed by: INTERNAL MEDICINE

## 2017-08-03 RX ORDER — DILTIAZEM HYDROCHLORIDE 180 MG/1
180 CAPSULE, EXTENDED RELEASE ORAL DAILY
Qty: 30 CAPSULE | Refills: 11 | Status: SHIPPED | OUTPATIENT
Start: 2017-08-03 | End: 2017-09-21 | Stop reason: ALTCHOICE

## 2017-08-03 RX ORDER — METOPROLOL SUCCINATE 25 MG/1
25 TABLET, EXTENDED RELEASE ORAL DAILY
Start: 2017-08-03 | End: 2017-09-21

## 2017-08-03 NOTE — PROGRESS NOTES
Subjective:     Encounter Date:08/03/2017      Patient ID: Miguelina Mueller is a 72 y.o. female.    Chief Complaint:  Atrial Fibrillation   Presents for follow-up visit. Symptoms include dizziness, hypertension and weakness. Symptoms are negative for chest pain, hypotension, pacemaker problem, palpitations, shortness of breath, syncope and tachycardia. The symptoms have been worsening. Past medical history includes atrial fibrillation.   Hypertension   This is a chronic problem. The current episode started more than 1 year ago. The problem is controlled. Associated symptoms include malaise/fatigue. Pertinent negatives include no chest pain, palpitations or shortness of breath.         72-year-old female who presents today for reevaluation.  Since I saw her last she has deteriorated.  She says she is incredibly fatigued.  She said since initiation of Toprol she has gone downhill.  She is at the point now where getting out of bed as a chore.  Unfortunately she also suffers from fibromyalgia and its to tell a difference.  In either case she feels poorly and presents today for further evaluation    Review of Systems   Constitution: Positive for weakness and malaise/fatigue.   Cardiovascular: Negative for chest pain, palpitations and syncope.   Respiratory: Negative for shortness of breath.    Neurological: Positive for dizziness.   All other systems reviewed and are negative.        ECG 12 Lead  Date/Time: 8/3/2017 3:15 PM  Performed by: KISHORE RUEDA  Authorized by: KISHORE RUEDA   Comparison: compared with previous ECG from 2/2/2017  Rhythm: atrial fibrillation  Clinical impression: abnormal ECG               Objective:     Physical Exam   Constitutional: She is oriented to person, place, and time. She appears well-developed.   HENT:   Head: Normocephalic.   Eyes: Conjunctivae are normal.   Neck: Normal range of motion.   Cardiovascular: Normal rate and normal heart sounds.  An irregularly irregular rhythm  present.   Pulmonary/Chest: Breath sounds normal.   Abdominal: Soft. Bowel sounds are normal.   Musculoskeletal: Normal range of motion. She exhibits no edema.   Neurological: She is alert and oriented to person, place, and time.   Skin: Skin is warm and dry.   Psychiatric: She has a normal mood and affect. Her behavior is normal.   Vitals reviewed.      Lab Review:       Assessment:         No diagnosis found.       Plan:       1.  Atrial fibrillation.  Patient was asymptomatic and therefore we took the approach of rate control and anticoagulation.  She was able to get a coupon for her Xarelto and it has been covered.  She now presents with significant change feeling very poorly unable to get out of bed.  This led to a very extensive discussion on choice is going forward.  We could either cardioverter, cardiovert her and initiate an antiarrhythmic, or try to change her rate control medications.  At this point were going to start with trying to change her medications.  I told her to cut her beta blocker in half for one week then in half again can discontinue it follow-up in 2 weeks.  I initiated diltiazem for the short-term.  2.  Hypertension blood pressures good  3.  Patient to follow-up in 2 weeks    Atrial Fibrillation and Atrial Flutter  Assessment  • The patient has paroxysmal atrial fibrillation  • The patient's CHADS2-VASc score is 3  • A [id number] score of 2 or more is considered a high risk for a thromboembolic event  • Rivaroxaban prescribed    Plan  • Continue in atrial fibrillation with rate control  • Continue rivaroxaban for antithrombotic therapy, bleeding issues discussed  • Add diltiazem for rate control

## 2017-08-04 DIAGNOSIS — R73.01 IMPAIRED FASTING GLUCOSE: ICD-10-CM

## 2017-08-04 DIAGNOSIS — I10 ESSENTIAL HYPERTENSION: ICD-10-CM

## 2017-08-04 DIAGNOSIS — E78.49 OTHER HYPERLIPIDEMIA: Primary | ICD-10-CM

## 2017-08-04 LAB
25(OH)D3+25(OH)D2 SERPL-MCNC: 51.7 NG/ML (ref 30–100)
ALBUMIN SERPL-MCNC: 4.6 G/DL (ref 3.5–5.2)
ALBUMIN/GLOB SERPL: 1.8 G/DL
ALP SERPL-CCNC: 115 U/L (ref 39–117)
ALT SERPL-CCNC: 15 U/L (ref 1–33)
AST SERPL-CCNC: 12 U/L (ref 1–32)
BILIRUB SERPL-MCNC: 0.9 MG/DL (ref 0.1–1.2)
BUN SERPL-MCNC: 15 MG/DL (ref 8–23)
BUN/CREAT SERPL: 12.3 (ref 7–25)
CALCIUM SERPL-MCNC: 9.8 MG/DL (ref 8.6–10.5)
CHLORIDE SERPL-SCNC: 103 MMOL/L (ref 98–107)
CHOLEST SERPL-MCNC: 198 MG/DL (ref 0–200)
CO2 SERPL-SCNC: 25 MMOL/L (ref 22–29)
CREAT SERPL-MCNC: 1.22 MG/DL (ref 0.57–1)
GLOBULIN SER CALC-MCNC: 2.6 GM/DL
GLUCOSE SERPL-MCNC: 105 MG/DL (ref 65–99)
HDLC SERPL-MCNC: 60 MG/DL (ref 40–60)
LDLC SERPL CALC-MCNC: 108 MG/DL (ref 0–100)
POTASSIUM SERPL-SCNC: 4.9 MMOL/L (ref 3.5–5.2)
PROT SERPL-MCNC: 7.2 G/DL (ref 6–8.5)
SODIUM SERPL-SCNC: 143 MMOL/L (ref 136–145)
TRIGL SERPL-MCNC: 151 MG/DL (ref 0–150)
VLDLC SERPL CALC-MCNC: 30.2 MG/DL (ref 5–40)

## 2017-08-04 RX ORDER — ROSUVASTATIN CALCIUM 20 MG/1
20 TABLET, COATED ORAL DAILY
Qty: 30 TABLET | Refills: 11 | Status: SHIPPED | OUTPATIENT
Start: 2017-08-04 | End: 2017-12-08

## 2017-08-17 ENCOUNTER — OFFICE VISIT (OUTPATIENT)
Dept: CARDIOLOGY | Facility: CLINIC | Age: 72
End: 2017-08-17

## 2017-08-17 VITALS
HEART RATE: 82 BPM | SYSTOLIC BLOOD PRESSURE: 136 MMHG | BODY MASS INDEX: 29.37 KG/M2 | HEIGHT: 64 IN | OXYGEN SATURATION: 94 % | WEIGHT: 172 LBS | DIASTOLIC BLOOD PRESSURE: 80 MMHG

## 2017-08-17 DIAGNOSIS — I48.0 PAF (PAROXYSMAL ATRIAL FIBRILLATION) (HCC): Primary | ICD-10-CM

## 2017-08-17 PROCEDURE — 99213 OFFICE O/P EST LOW 20 MIN: CPT | Performed by: INTERNAL MEDICINE

## 2017-08-17 NOTE — PROGRESS NOTES
Subjective:     Encounter Date:08/03/2017      Patient ID: Miguelina Mueller is a 72 y.o. female.    Chief Complaint:  Atrial Fibrillation   Presents for follow-up visit. Symptoms include dizziness, hypertension and weakness. Symptoms are negative for chest pain, hypotension, pacemaker problem, palpitations, shortness of breath, syncope and tachycardia. The symptoms have been worsening. Past medical history includes atrial fibrillation.   Hypertension   This is a chronic problem. The current episode started more than 1 year ago. The problem is controlled. Associated symptoms include malaise/fatigue. Pertinent negatives include no chest pain, palpitations or shortness of breath.         72-year-old female who presents today for reevaluation. Patient says she is not sure she is much better.  She is down to a quarter of a pill of the beta blocker her heart rate is still well controlled.  She still however is having shortness of breath and a lot of fatigue.    Review of Systems   Constitution: Positive for weakness and malaise/fatigue.   Cardiovascular: Negative for chest pain, palpitations and syncope.   Respiratory: Negative for shortness of breath.    Neurological: Positive for dizziness.   All other systems reviewed and are negative.      Procedures         Objective:     Physical Exam   Constitutional: She is oriented to person, place, and time. She appears well-developed.   HENT:   Head: Normocephalic.   Eyes: Conjunctivae are normal.   Neck: Normal range of motion.   Cardiovascular: Normal rate and normal heart sounds.  An irregularly irregular rhythm present.   Pulmonary/Chest: Breath sounds normal.   Abdominal: Soft. Bowel sounds are normal.   Musculoskeletal: Normal range of motion. She exhibits no edema.   Neurological: She is alert and oriented to person, place, and time.   Skin: Skin is warm and dry.   Psychiatric: She has a normal mood and affect. Her behavior is normal.   Vitals reviewed.      Lab Review:        Assessment:          Diagnosis Plan   1. PAF (paroxysmal atrial fibrillation)            Plan:       1.  Atrial fibrillation.  Patient remains in atrial fibrillation heart rate controlled weaning off beta blocker.  She is taking her Xarelto.  At this point I'm when a stop her beta blocker altogether.  She was little bit confused about her medication was unsure if she is taking the Cardizem or not.  She's going to review that and contact my office back for reassessment 2 weeks  2.  Hypertension blood pressures good  3.  Patient to follow-up in 2 weeks    Atrial Fibrillation and Atrial Flutter  Assessment  • The patient has paroxysmal atrial fibrillation  • The patient's CHADS2-VASc score is 3  • A [id number] score of 2 or more is considered a high risk for a thromboembolic event  • Rivaroxaban prescribed    Plan  • Continue in atrial fibrillation with rate control  • Continue rivaroxaban for antithrombotic therapy, bleeding issues discussed  • Add diltiazem for rate control

## 2017-08-18 DIAGNOSIS — M79.7 FIBROMYALGIA: ICD-10-CM

## 2017-08-22 RX ORDER — TRAMADOL HYDROCHLORIDE 50 MG/1
TABLET ORAL
Qty: 120 TABLET | OUTPATIENT
Start: 2017-08-22

## 2017-09-13 ENCOUNTER — OFFICE VISIT (OUTPATIENT)
Dept: FAMILY MEDICINE CLINIC | Facility: CLINIC | Age: 72
End: 2017-09-13

## 2017-09-13 VITALS
SYSTOLIC BLOOD PRESSURE: 110 MMHG | BODY MASS INDEX: 29.71 KG/M2 | HEART RATE: 80 BPM | DIASTOLIC BLOOD PRESSURE: 70 MMHG | OXYGEN SATURATION: 95 % | WEIGHT: 174 LBS | RESPIRATION RATE: 16 BRPM | HEIGHT: 64 IN | TEMPERATURE: 98.3 F

## 2017-09-13 DIAGNOSIS — I10 ESSENTIAL HYPERTENSION: Primary | ICD-10-CM

## 2017-09-13 DIAGNOSIS — M79.7 FIBROMYALGIA: ICD-10-CM

## 2017-09-13 DIAGNOSIS — F34.1 DYSTHYMIA: ICD-10-CM

## 2017-09-13 DIAGNOSIS — N28.9 RENAL INSUFFICIENCY: ICD-10-CM

## 2017-09-13 DIAGNOSIS — Z23 ENCOUNTER FOR IMMUNIZATION: ICD-10-CM

## 2017-09-13 DIAGNOSIS — F41.9 ANXIETY: ICD-10-CM

## 2017-09-13 PROCEDURE — G0008 ADMIN INFLUENZA VIRUS VAC: HCPCS | Performed by: PHYSICIAN ASSISTANT

## 2017-09-13 PROCEDURE — 99213 OFFICE O/P EST LOW 20 MIN: CPT | Performed by: PHYSICIAN ASSISTANT

## 2017-09-13 RX ORDER — DULOXETIN HYDROCHLORIDE 60 MG/1
60 CAPSULE, DELAYED RELEASE ORAL DAILY
Qty: 30 CAPSULE | Refills: 11 | Status: SHIPPED | OUTPATIENT
Start: 2017-09-13 | End: 2018-08-24 | Stop reason: SDUPTHER

## 2017-09-13 RX ORDER — DILTIAZEM HYDROCHLORIDE 180 MG/1
CAPSULE, EXTENDED RELEASE ORAL
Refills: 11 | COMMUNITY
Start: 2017-09-02 | End: 2018-01-18 | Stop reason: ALTCHOICE

## 2017-09-13 RX ORDER — TRAMADOL HYDROCHLORIDE 50 MG/1
50 TABLET ORAL EVERY 6 HOURS PRN
Qty: 120 TABLET | Refills: 2
Start: 2017-09-13 | End: 2017-12-07 | Stop reason: SDUPTHER

## 2017-09-13 RX ORDER — LISINOPRIL 10 MG/1
10 TABLET ORAL DAILY
Qty: 30 TABLET | Refills: 11 | Status: SHIPPED | OUTPATIENT
Start: 2017-09-13 | End: 2018-01-18 | Stop reason: ALTCHOICE

## 2017-09-13 NOTE — PROGRESS NOTES
Subjective   Miguelina Mueller is a 72 y.o. female.     History of Present Illness   Miguelina Mueller female 72 y.o. who presents today for follow up of Depression and Anxiety.  She reports this is her best med and still gets depressed. Onset of symptoms was approximately several years ago.  She denies current suicidal and homicidal ideation. Risk factors are chronic pain.  Previous treatment includes current Rx.  She complains of the following medication side effects: none.  The patient declines to go to counseling..     No family or personal history of addiction.  Since the last visit, she has overall felt tired.  She has Hypertenision and is well controlled on medication, Hyperlipidemia and is well controlled on medication and Atrial Fibrillation and remains under the care of their cardiologist for medical management.  she has been compliant with current medications have reviewed them.  The patient denies medication side effects.     She is on ultram and cymbalta for fibro pain     She sees Dr Dover for management of a fib      Notes Recorded by Yuki Pennington PA-C on 8/4/2017 at 8:01 AM  Est CC 52;  Avoid NSAIDs and watching renal functions;  Vit D is great  LDL not at goal;  Has she been on any Rx other than Pravastatin?  Need to think about changing to generic Crestor;-----I did change to this and tolerating it  Lab Results   Component Value Date    HGBA1C 5.40 04/25/2016     I have labs ordered for Nov    The following portions of the patient's history were reviewed and updated as appropriate: allergies, current medications, past family history, past medical history, past social history, past surgical history and problem list.    Review of Systems   Constitutional: Positive for fatigue. Negative for activity change, appetite change and unexpected weight change.   HENT: Negative for nosebleeds and trouble swallowing.    Eyes: Negative for pain and visual disturbance.   Respiratory: Negative for chest tightness,  shortness of breath and wheezing.    Cardiovascular: Negative for chest pain and palpitations.   Gastrointestinal: Negative for abdominal pain and blood in stool.   Endocrine: Negative.    Genitourinary: Negative for difficulty urinating and hematuria.   Musculoskeletal: Positive for myalgias. Negative for joint swelling.   Skin: Negative for color change and rash.   Allergic/Immunologic: Negative.    Neurological: Negative for syncope and speech difficulty.   Hematological: Negative for adenopathy.   Psychiatric/Behavioral: Positive for dysphoric mood and sleep disturbance. Negative for agitation and confusion.   All other systems reviewed and are negative.      Objective   Physical Exam   Constitutional: She is oriented to person, place, and time. She appears well-developed and well-nourished. No distress.   HENT:   Head: Normocephalic.   Eyes: Conjunctivae and EOM are normal. Pupils are equal, round, and reactive to light.   Neck: Normal range of motion. Neck supple.   Cardiovascular: Normal rate, regular rhythm and normal heart sounds.    No murmur heard.  Pulmonary/Chest: Effort normal and breath sounds normal.   Musculoskeletal: Normal range of motion.   Neurological: She is alert and oriented to person, place, and time.   Skin: Skin is warm and dry. No rash noted. She is not diaphoretic.   Psychiatric: Her behavior is normal. Judgment and thought content normal. Her affect is not inappropriate. She is not actively hallucinating. Cognition and memory are normal.   Nursing note and vitals reviewed.      Assessment/Plan   Problems Addressed this Visit        Cardiovascular and Mediastinum    Essential hypertension - Primary    Relevant Medications    CARTIA  MG 24 hr capsule    lisinopril (PRINIVIL,ZESTRIL) 10 MG tablet    Other Relevant Orders    Flu Vaccine Quad PF 3YR+ (Completed)       Musculoskeletal and Integument    Fibromyalgia    Relevant Medications    traMADol (ULTRAM) 50 MG tablet    Other  Relevant Orders    Flu Vaccine Quad PF 3YR+ (Completed)       Genitourinary    Renal insufficiency    Relevant Orders    Flu Vaccine Quad PF 3YR+ (Completed)       Other    Anxiety    Relevant Orders    Flu Vaccine Quad PF 3YR+ (Completed)    Depression    Relevant Medications    DULoxetine (CYMBALTA) 60 MG capsule    Other Relevant Orders    Flu Vaccine Quad PF 3YR+ (Completed)      Other Visit Diagnoses     Encounter for immunization         Relevant Orders    Flu Vaccine Quad PF 3YR+ (Completed)                I have reviewed the notes, assessments, and/or procedures performed by Yuki Pennington PA-C, I concur with her/his documentation of Miguelina Mueller.

## 2017-09-13 NOTE — PATIENT INSTRUCTIONS
Warned patient that this medication can cause drowsiness and impair them operating machinery, including driving a car.  Caution is advised.  Low glycemic index diet  Exercise 30 minutes most days of the week  Make sure you get results on any labs or tests we ordered today  We discussed medications and how to take them as prescribed  Sleep 6-8 hours each night if possible  If you have not signed up for [employer], please activate your code ASAP from your After Visit Summary today    LDL goal <100  LDL goal if heart disease <70  HDL goal >60  Triglyceride goal <150  BP goal =<130/80  Fasting glucose <100    Labs Nov 3 or after  Contact office if your depression worsens   Go to ER if start to develop feelings for suicide  Take medication as prescribed  If you are having trouble tolerating your medication, contact office before abruptly stopping it

## 2017-09-21 ENCOUNTER — OFFICE VISIT (OUTPATIENT)
Dept: CARDIOLOGY | Facility: CLINIC | Age: 72
End: 2017-09-21

## 2017-09-21 VITALS
DIASTOLIC BLOOD PRESSURE: 80 MMHG | BODY MASS INDEX: 29.37 KG/M2 | HEIGHT: 64 IN | SYSTOLIC BLOOD PRESSURE: 120 MMHG | HEART RATE: 101 BPM | WEIGHT: 172 LBS

## 2017-09-21 DIAGNOSIS — I48.19 PERSISTENT ATRIAL FIBRILLATION (HCC): Primary | ICD-10-CM

## 2017-09-21 PROCEDURE — 93000 ELECTROCARDIOGRAM COMPLETE: CPT | Performed by: INTERNAL MEDICINE

## 2017-09-21 PROCEDURE — 99214 OFFICE O/P EST MOD 30 MIN: CPT | Performed by: INTERNAL MEDICINE

## 2017-09-21 RX ORDER — AMIODARONE HYDROCHLORIDE 200 MG/1
200 TABLET ORAL DAILY
Qty: 30 TABLET | Refills: 3 | Status: SHIPPED | OUTPATIENT
Start: 2017-09-21 | End: 2017-12-19 | Stop reason: SDUPTHER

## 2017-09-22 NOTE — PROGRESS NOTES
Subjective:     Encounter Date:08/03/2017      Patient ID: Miguelina Mueller is a 72 y.o. female.    Chief Complaint:  Atrial Fibrillation   Presents for follow-up visit. Symptoms include dizziness, hypertension and weakness. Symptoms are negative for hypotension, pacemaker problem, syncope and tachycardia. The symptoms have been worsening. Past medical history includes atrial fibrillation.   Hypertension   This is a chronic problem. The current episode started more than 1 year ago. The problem is controlled. Associated symptoms include malaise/fatigue.         72-year-old female who presents today for reevaluation. Patient says she is not sure she is much better. She continues to have an enormous amount of fatigue really doesn't feel better.  Her heart rate is a little bit better overall.    Review of Systems   Constitution: Positive for weakness and malaise/fatigue.   Cardiovascular: Negative for syncope.   Neurological: Positive for dizziness.   All other systems reviewed and are negative.        ECG 12 Lead  Date/Time: 9/21/2017 3:18 PM  Performed by: KISHORE RUEDA  Authorized by: KISHORE RUEDA   Comparison: compared with previous ECG from 8/3/2017  Similar to previous ECG  Rhythm: atrial fibrillation  Clinical impression: abnormal ECG             Objective:     Physical Exam   Constitutional: She is oriented to person, place, and time. She appears well-developed.   HENT:   Head: Normocephalic.   Eyes: Conjunctivae are normal.   Neck: Normal range of motion.   Cardiovascular: Normal rate, regular rhythm and normal heart sounds.  An irregularly irregular rhythm present.   Pulmonary/Chest: Breath sounds normal.   Abdominal: Soft. Bowel sounds are normal.   Musculoskeletal: Normal range of motion. She exhibits no edema.   Neurological: She is alert and oriented to person, place, and time.   Skin: Skin is warm and dry.   Psychiatric: She has a normal mood and affect. Her behavior is normal.   Vitals  reviewed.      Lab Review:       Assessment:          Diagnosis Plan   1. Persistent atrial fibrillation  ECG 12 Lead          Plan:     1.  Persistent atrial fibrillation.  Although he had multiple etiologies for why she could be fatigue she continues to feel poorly.  At this point I'm going to try to cardiovert her to see if it makes her feel better.  I placed her on amiodarone 400 mg a day for one week then 200 mg a day.  She is to get an EKG on Monday and then subsequently again in a week if she hasn't converted chemically then will set her up for a DC cardioversion.  2.  Hypertension blood pressures great        Atrial Fibrillation and Atrial Flutter  Assessment  • The patient has paroxysmal atrial fibrillation  • The patient's CHADS2-VASc score is 3  • A [id number] score of 2 or more is considered a high risk for a thromboembolic event  • Rivaroxaban prescribed    Plan  • Continue in atrial fibrillation with rate control  • Continue rivaroxaban for antithrombotic therapy, bleeding issues discussed  • Add diltiazem for rate control

## 2017-09-25 ENCOUNTER — CLINICAL SUPPORT (OUTPATIENT)
Dept: CARDIOLOGY | Facility: CLINIC | Age: 72
End: 2017-09-25

## 2017-09-25 DIAGNOSIS — I48.0 PAF (PAROXYSMAL ATRIAL FIBRILLATION) (HCC): Primary | ICD-10-CM

## 2017-09-25 PROCEDURE — 93000 ELECTROCARDIOGRAM COMPLETE: CPT | Performed by: INTERNAL MEDICINE

## 2017-09-25 NOTE — PROGRESS NOTES
Procedure     ECG 12 Lead  Date/Time: 9/25/2017 1:31 PM  Performed by: KISHORE RUEDA  Authorized by: KISHORE RUEDA   Comparison: compared with previous ECG from 8/3/2007  Similar to previous ECG  Rhythm: atrial fibrillation  Clinical impression: abnormal ECG           Ms. Mueller was here today for an EKG only.  Per Dr. Rueda, pt is to continue the same(Amiodarone 200mg 2 tablets for 1 week then 1 tablet daily) and follow up with another EKG next Monday.  Pt is scheduled for EKG only on 10/2/17 at 12:30.pm.  Pt was advised and understood verbally./ion

## 2017-10-02 ENCOUNTER — TELEPHONE (OUTPATIENT)
Dept: CARDIOLOGY | Facility: CLINIC | Age: 72
End: 2017-10-02

## 2017-10-02 ENCOUNTER — CLINICAL SUPPORT (OUTPATIENT)
Dept: CARDIOLOGY | Facility: CLINIC | Age: 72
End: 2017-10-02

## 2017-10-02 DIAGNOSIS — I48.19 ATRIAL FIBRILLATION, PERSISTENT (HCC): Primary | ICD-10-CM

## 2017-10-02 DIAGNOSIS — I48.19 PERSISTENT ATRIAL FIBRILLATION (HCC): Primary | ICD-10-CM

## 2017-10-02 PROCEDURE — 93000 ELECTROCARDIOGRAM COMPLETE: CPT | Performed by: INTERNAL MEDICINE

## 2017-10-02 NOTE — PROGRESS NOTES
Procedure     ECG 12 Lead  Date/Time: 10/2/2017 12:50 PM  Performed by: KISHORE RUEDA  Authorized by: KISHORE RUEDA   Comparison: compared with previous ECG from 9/21/2017  Similar to previous ECG  Rhythm: atrial fibrillation  Clinical impression: abnormal ECG           Ms. Mueller came in today for a follow up EKG.  Per Dr. Rueda, pt is to be set up for a cardioversion.  Pt was advised and understands verbally./jlf

## 2017-10-04 DIAGNOSIS — E55.9 VITAMIN D DEFICIENCY: ICD-10-CM

## 2017-10-04 RX ORDER — CHOLECALCIFEROL (VITAMIN D3) 50 MCG
CAPSULE ORAL
Qty: 90 EACH | Refills: 3 | Status: SHIPPED | OUTPATIENT
Start: 2017-10-04 | End: 2017-12-08 | Stop reason: SDUPTHER

## 2017-10-13 DIAGNOSIS — F34.1 DYSTHYMIA: ICD-10-CM

## 2017-10-13 DIAGNOSIS — F41.9 ANXIETY: ICD-10-CM

## 2017-10-13 RX ORDER — DULOXETIN HYDROCHLORIDE 60 MG/1
CAPSULE, DELAYED RELEASE ORAL
Qty: 30 CAPSULE | Refills: 10 | Status: SHIPPED | OUTPATIENT
Start: 2017-10-13 | End: 2018-01-18 | Stop reason: ALTCHOICE

## 2017-10-18 ENCOUNTER — HOSPITAL ENCOUNTER (OUTPATIENT)
Dept: POSTOP/PACU | Facility: HOSPITAL | Age: 72
Discharge: HOME | End: 2017-10-18
Attending: INTERNAL MEDICINE | Admitting: INTERNAL MEDICINE

## 2017-10-18 ENCOUNTER — TELEPHONE (OUTPATIENT)
Dept: CARDIOLOGY | Facility: CLINIC | Age: 72
End: 2017-10-18

## 2017-10-18 VITALS
RESPIRATION RATE: 16 BRPM | SYSTOLIC BLOOD PRESSURE: 139 MMHG | DIASTOLIC BLOOD PRESSURE: 65 MMHG | HEART RATE: 76 BPM | TEMPERATURE: 99.2 F | OXYGEN SATURATION: 97 %

## 2017-10-18 DIAGNOSIS — I48.19 PERSISTENT ATRIAL FIBRILLATION (HCC): ICD-10-CM

## 2017-10-18 LAB
ANION GAP SERPL CALCULATED.3IONS-SCNC: 12.2 MMOL/L
BUN BLD-MCNC: 20 MG/DL (ref 8–23)
BUN/CREAT SERPL: 15.3 (ref 7–25)
CALCIUM SPEC-SCNC: 9.6 MG/DL (ref 8.6–10.5)
CHLORIDE SERPL-SCNC: 104 MMOL/L (ref 98–107)
CO2 SERPL-SCNC: 25.8 MMOL/L (ref 22–29)
CREAT BLD-MCNC: 1.31 MG/DL (ref 0.57–1)
GFR SERPL CREATININE-BSD FRML MDRD: 40 ML/MIN/1.73
GLUCOSE BLD-MCNC: 92 MG/DL (ref 65–99)
POTASSIUM BLD-SCNC: 4.1 MMOL/L (ref 3.5–5.2)
SODIUM BLD-SCNC: 142 MMOL/L (ref 136–145)

## 2017-10-18 PROCEDURE — 80048 BASIC METABOLIC PNL TOTAL CA: CPT | Performed by: INTERNAL MEDICINE

## 2017-10-18 PROCEDURE — 93005 ELECTROCARDIOGRAM TRACING: CPT | Performed by: INTERNAL MEDICINE

## 2017-10-18 PROCEDURE — 92960 CARDIOVERSION ELECTRIC EXT: CPT

## 2017-10-18 PROCEDURE — 93010 ELECTROCARDIOGRAM REPORT: CPT | Performed by: INTERNAL MEDICINE

## 2017-10-18 NOTE — NURSING NOTE
Pt found to be in sinus rhythm. Dr Dover notified via Jaylene NGUYEN.  Case cancelled. Pt discharged.

## 2017-10-25 NOTE — TELEPHONE ENCOUNTER
Vicki,    Patient called back stating that she needs a 4wk bhe follow up with Dr. Dover at the [facility] office. Can you please advise were to schedule the patient?    Eliecer

## 2017-11-04 ENCOUNTER — RESULTS ENCOUNTER (OUTPATIENT)
Dept: FAMILY MEDICINE CLINIC | Facility: CLINIC | Age: 72
End: 2017-11-04

## 2017-11-04 DIAGNOSIS — I10 ESSENTIAL HYPERTENSION: ICD-10-CM

## 2017-11-04 DIAGNOSIS — E78.49 OTHER HYPERLIPIDEMIA: ICD-10-CM

## 2017-11-04 DIAGNOSIS — R73.01 IMPAIRED FASTING GLUCOSE: ICD-10-CM

## 2017-11-11 DIAGNOSIS — M79.7 FIBROMYALGIA: ICD-10-CM

## 2017-11-13 RX ORDER — TRAMADOL HYDROCHLORIDE 50 MG/1
TABLET ORAL
Qty: 120 TABLET | OUTPATIENT
Start: 2017-11-13

## 2017-11-16 ENCOUNTER — OFFICE VISIT (OUTPATIENT)
Dept: CARDIOLOGY | Facility: CLINIC | Age: 72
End: 2017-11-16

## 2017-11-16 VITALS
SYSTOLIC BLOOD PRESSURE: 136 MMHG | BODY MASS INDEX: 29.71 KG/M2 | HEART RATE: 79 BPM | HEIGHT: 64 IN | WEIGHT: 174 LBS | DIASTOLIC BLOOD PRESSURE: 84 MMHG

## 2017-11-16 DIAGNOSIS — I48.0 PAF (PAROXYSMAL ATRIAL FIBRILLATION) (HCC): Primary | ICD-10-CM

## 2017-11-16 PROCEDURE — 99214 OFFICE O/P EST MOD 30 MIN: CPT | Performed by: INTERNAL MEDICINE

## 2017-11-16 PROCEDURE — 93000 ELECTROCARDIOGRAM COMPLETE: CPT | Performed by: INTERNAL MEDICINE

## 2017-11-16 NOTE — PROGRESS NOTES
Subjective:     Encounter Date:11/16/2017      Patient ID: Miguelina Mueller is a 72 y.o. female.    Chief Complaint:  Atrial Fibrillation   Presents for follow-up visit. Symptoms include dizziness, hypertension and weakness. Symptoms are negative for hypotension, pacemaker problem, syncope and tachycardia. The symptoms have been worsening. Past medical history includes atrial fibrillation.   Hypertension   This is a chronic problem. The current episode started more than 1 year ago. The problem is controlled. Associated symptoms include malaise/fatigue.     72-year-old female with history of paroxysmal atrial fibrillation.  Patient was placed on amiodarone.  She was set up for cardioversion however the day of her cardioversion she had spontaneously converted to a normal sinus rhythm.  She presents today for reevaluation.  She unfortunately continues to feel very poorly.  She still just incredibly fatigued said it was very difficult to walk in the office today.    Review of Systems   Constitution: Positive for weakness and malaise/fatigue.   Cardiovascular: Negative for syncope.   Neurological: Positive for dizziness.   All other systems reviewed and are negative.        ECG 12 Lead  Date/Time: 11/16/2017 4:52 PM  Performed by: KISHORE RUEDA  Authorized by: KISHORE RUEDA   Comparison: compared with previous ECG from 9/21/2017  Similar to previous ECG  Rhythm: sinus rhythm  Clinical impression: normal ECG               Objective:     Physical Exam   Constitutional: She is oriented to person, place, and time. She appears well-developed.   HENT:   Head: Normocephalic.   Eyes: Conjunctivae are normal.   Neck: Normal range of motion.   Cardiovascular: Normal rate, regular rhythm and normal heart sounds.    Pulmonary/Chest: Breath sounds normal.   Abdominal: Soft. Bowel sounds are normal.   Musculoskeletal: Normal range of motion. She exhibits no edema.   Neurological: She is alert and oriented to person, place,  and time.   Skin: Skin is warm and dry.   Psychiatric: She has a normal mood and affect. Her behavior is normal.   Vitals reviewed.      Lab Review:       Assessment:         No diagnosis found.       Plan:       1.  Paroxysmal atrial fibrillation.  She remains in sinus rhythm.  Unfortunately she does not feel any better.  For the longest time she was very concerned that the atrial fibrillation was why she felt so poorly.  2.  Hypertension.  Blood pressure is okay.    3.  At this point I think the best thing to do is to eliminate her medicines one by one and see how she does.  We'll start with the Crestor.  I told her to stop it first.  Then I want to stop her diltiazem.  I would stop her Prinivil next I would stop her Xarelto.  She is going to stop each one of these in 2 week increments.  I did tell her to follow her blood pressure closely were going to see if there is a medication that could be causing her symptoms.  4.  Patient to follow-up in 8 weeks    Atrial Fibrillation and Atrial Flutter  Assessment  • The patient has paroxysmal atrial fibrillation  • The patient's CHADS2-VASc score is 3  • A [id number] score of 2 or more is considered a high risk for a thromboembolic event  • Rivaroxaban prescribed    Plan  • Continue in atrial fibrillation with rate control  • Continue rivaroxaban for antithrombotic therapy, bleeding issues discussed  • Add diltiazem for rate control

## 2017-12-07 DIAGNOSIS — M79.7 FIBROMYALGIA: ICD-10-CM

## 2017-12-07 RX ORDER — TRAMADOL HYDROCHLORIDE 50 MG/1
50 TABLET ORAL EVERY 6 HOURS PRN
Qty: 120 TABLET | Refills: 2 | Status: SHIPPED | OUTPATIENT
Start: 2017-12-07 | End: 2018-02-21 | Stop reason: SDUPTHER

## 2017-12-08 ENCOUNTER — OFFICE VISIT (OUTPATIENT)
Dept: FAMILY MEDICINE CLINIC | Facility: CLINIC | Age: 72
End: 2017-12-08

## 2017-12-08 VITALS
DIASTOLIC BLOOD PRESSURE: 70 MMHG | HEIGHT: 64 IN | SYSTOLIC BLOOD PRESSURE: 110 MMHG | RESPIRATION RATE: 16 BRPM | TEMPERATURE: 98.6 F | HEART RATE: 88 BPM | BODY MASS INDEX: 29.71 KG/M2 | OXYGEN SATURATION: 96 % | WEIGHT: 174 LBS

## 2017-12-08 DIAGNOSIS — E78.49 OTHER HYPERLIPIDEMIA: ICD-10-CM

## 2017-12-08 DIAGNOSIS — F34.1 DYSTHYMIA: Primary | ICD-10-CM

## 2017-12-08 DIAGNOSIS — Z86.79 HISTORY OF ATRIAL FIBRILLATION: ICD-10-CM

## 2017-12-08 DIAGNOSIS — F41.9 ANXIETY: ICD-10-CM

## 2017-12-08 DIAGNOSIS — E55.9 VITAMIN D DEFICIENCY: ICD-10-CM

## 2017-12-08 DIAGNOSIS — I10 ESSENTIAL HYPERTENSION: ICD-10-CM

## 2017-12-08 DIAGNOSIS — M79.7 FIBROMYALGIA: ICD-10-CM

## 2017-12-08 PROCEDURE — 99214 OFFICE O/P EST MOD 30 MIN: CPT | Performed by: PHYSICIAN ASSISTANT

## 2017-12-08 NOTE — PATIENT INSTRUCTIONS
Low glycemic index diet  Exercise 30 minutes most days of the week  Make sure you get results on any labs or tests we ordered today  We discussed medications and how to take them as prescribed  Sleep 6-8 hours each night if possible  If you have not signed up for [email or web address], please activate your code ASAP from your After Visit Summary today    LDL goal <100  LDL goal if heart disease <70  HDL goal >60  Triglyceride goal <150  BP goal =<130/80  Fasting glucose <100    Warned patient that this medication can cause drowsiness and impair them operating machinery, including driving a car.  Caution is advised.

## 2017-12-08 NOTE — PROGRESS NOTES
Subjective   Miguelina Mueller is a 72 y.o. female.     History of Present Illness   Miguelina Mueller female 72 y.o. who presents today for follow up of Depression and Anxiety.  She reports this is her best med and still gets depressed. Onset of symptoms was approximately several years ago.  She denies current suicidal and homicidal ideation. Risk factors are chronic pain.  Previous treatment includes current Rx.  She complains of the following medication side effects: none.  The patient declines to go to counseling..    She is on the Ultram for the Fibromyalgia pain and does help. Tolerates this well.    No family or personal history of addiction.  The patient has read and signed the [facility] Controlled Substance Contract.  I will continue to see patient for regular follow up appointments.  They are well controlled on their medication.  NATA has been reviewed by me and is updated every 3 months. The patient is aware of the potential for addiction and dependence.    Miguelina Mueller 72 y.o. female who presents today for routine follow up check and medication refills.  she has a history of   Patient Active Problem List   Diagnosis   • Anxiety   • Depression   • Fibromyalgia   • Hyperlipidemia   • Essential hypertension   • IFG (impaired fasting glucose)   • Renal insufficiency   • Shoulder bursitis   • Seasonal allergic rhinitis   • PAF (paroxysmal atrial fibrillation)   .  Since the last visit, she has overall felt tired.  She has Hypertenision and is well controlled on medication, Hyperlipidemia and is well controlled on medication and Vitamin D deficiency and well controlled on medication and labs at goal >30.  she has been compliant with current medications have reviewed them.  The patient denies medication side effects.    Results for orders placed or performed during the hospital encounter of 10/18/17   Basic Metabolic Panel   Result Value Ref Range    Glucose 92 65 - 99 mg/dL    BUN 20 8 - 23 mg/dL    Creatinine  1.31 (H) 0.57 - 1.00 mg/dL    Sodium 142 136 - 145 mmol/L    Potassium 4.1 3.5 - 5.2 mmol/L    Chloride 104 98 - 107 mmol/L    CO2 25.8 22.0 - 29.0 mmol/L    Calcium 9.6 8.6 - 10.5 mg/dL    eGFR Non African Amer 40 (L) >60 mL/min/1.73    BUN/Creatinine Ratio 15.3 7.0 - 25.0    Anion Gap 12.2 mmol/L   had labs today  I changed Pravastatin to Crestor and need f/u labs and have order.  She has been off since DR Dover visit.  11-16-17  ?made her feel worse.  Not sure if helped yet.  The following portions of the patient's history were reviewed and updated as appropriate: allergies, current medications, past family history, past medical history, past social history, past surgical history and problem list.    Review of Systems   Constitutional: Positive for fatigue. Negative for activity change, appetite change and unexpected weight change.   HENT: Negative for nosebleeds and trouble swallowing.    Eyes: Negative for pain and visual disturbance.   Respiratory: Negative for chest tightness, shortness of breath and wheezing.    Cardiovascular: Negative for chest pain and palpitations.   Gastrointestinal: Negative for abdominal pain and blood in stool.   Endocrine: Negative.    Genitourinary: Negative for difficulty urinating and hematuria.   Musculoskeletal: Positive for myalgias. Negative for joint swelling.   Skin: Negative for color change and rash.   Allergic/Immunologic: Negative.    Neurological: Negative for syncope and speech difficulty.   Hematological: Negative for adenopathy.   Psychiatric/Behavioral: Positive for dysphoric mood and sleep disturbance. Negative for agitation and confusion.   All other systems reviewed and are negative.      Objective   Physical Exam   Constitutional: She is oriented to person, place, and time. She appears well-developed and well-nourished. No distress.   HENT:   Head: Normocephalic.   Eyes: Conjunctivae and EOM are normal. Pupils are equal, round, and reactive to light.   Neck:  Normal range of motion. Neck supple.   Cardiovascular: Normal rate, regular rhythm and normal heart sounds.    No murmur heard.  Pulmonary/Chest: Effort normal and breath sounds normal.   Musculoskeletal: Normal range of motion.   Neurological: She is alert and oriented to person, place, and time.   Skin: Skin is warm and dry. No rash noted. She is not diaphoretic.   Psychiatric: Her behavior is normal. Judgment and thought content normal. Her affect is not inappropriate. She is not actively hallucinating. Cognition and memory are normal.   Nursing note and vitals reviewed.      Assessment/Plan   Problems Addressed this Visit        Cardiovascular and Mediastinum    Hyperlipidemia    Essential hypertension       Other    Anxiety    Depression - Primary    Fibromyalgia      Other Visit Diagnoses     Vitamin D deficiency        Relevant Medications    Cholecalciferol (D3 SUPER STRENGTH) 2000 units capsule    History of atrial fibrillation                    I have reviewed the notes, assessments, and/or procedures performed by Yuki Pennington PA-C, I concur with her/his documentation of Miguelina Mueller.

## 2017-12-09 LAB
ALBUMIN SERPL-MCNC: 4.2 G/DL (ref 3.5–5.2)
ALBUMIN/GLOB SERPL: 1.5 G/DL
ALP SERPL-CCNC: 122 U/L (ref 39–117)
ALT SERPL-CCNC: 14 U/L (ref 1–33)
AST SERPL-CCNC: 18 U/L (ref 1–32)
BILIRUB SERPL-MCNC: 0.4 MG/DL (ref 0.1–1.2)
BUN SERPL-MCNC: 15 MG/DL (ref 8–23)
BUN/CREAT SERPL: 13.2 (ref 7–25)
CALCIUM SERPL-MCNC: 9.8 MG/DL (ref 8.6–10.5)
CHLORIDE SERPL-SCNC: 102 MMOL/L (ref 98–107)
CHOLEST SERPL-MCNC: 265 MG/DL (ref 0–200)
CO2 SERPL-SCNC: 25.5 MMOL/L (ref 22–29)
CREAT SERPL-MCNC: 1.14 MG/DL (ref 0.57–1)
GFR SERPLBLD CREATININE-BSD FMLA CKD-EPI: 47 ML/MIN/1.73
GFR SERPLBLD CREATININE-BSD FMLA CKD-EPI: 57 ML/MIN/1.73
GLOBULIN SER CALC-MCNC: 2.8 GM/DL
GLUCOSE SERPL-MCNC: 95 MG/DL (ref 65–99)
HBA1C MFR BLD: 5.41 % (ref 4.8–5.6)
HDLC SERPL-MCNC: 62 MG/DL (ref 40–60)
LDLC SERPL CALC-MCNC: 168 MG/DL (ref 0–100)
POTASSIUM SERPL-SCNC: 4.8 MMOL/L (ref 3.5–5.2)
PROT SERPL-MCNC: 7 G/DL (ref 6–8.5)
SODIUM SERPL-SCNC: 142 MMOL/L (ref 136–145)
TRIGL SERPL-MCNC: 173 MG/DL (ref 0–150)
VLDLC SERPL CALC-MCNC: 34.6 MG/DL (ref 5–40)

## 2017-12-19 RX ORDER — AMIODARONE HYDROCHLORIDE 200 MG/1
TABLET ORAL
Qty: 30 TABLET | Refills: 0 | Status: SHIPPED | OUTPATIENT
Start: 2017-12-19 | End: 2018-02-21 | Stop reason: SDUPTHER

## 2018-01-18 ENCOUNTER — OFFICE VISIT (OUTPATIENT)
Dept: CARDIOLOGY | Facility: CLINIC | Age: 73
End: 2018-01-18

## 2018-01-18 VITALS
HEART RATE: 74 BPM | BODY MASS INDEX: 30.22 KG/M2 | SYSTOLIC BLOOD PRESSURE: 136 MMHG | DIASTOLIC BLOOD PRESSURE: 88 MMHG | OXYGEN SATURATION: 94 % | HEIGHT: 64 IN | WEIGHT: 177 LBS

## 2018-01-18 DIAGNOSIS — I48.0 PAF (PAROXYSMAL ATRIAL FIBRILLATION) (HCC): ICD-10-CM

## 2018-01-18 DIAGNOSIS — I10 ESSENTIAL HYPERTENSION: Primary | ICD-10-CM

## 2018-01-18 PROCEDURE — 99214 OFFICE O/P EST MOD 30 MIN: CPT | Performed by: INTERNAL MEDICINE

## 2018-01-19 NOTE — PROGRESS NOTES
Subjective:     Encounter Date:01/18/2018      Patient ID: Miguelina Mueller is a 72 y.o. female.    Chief Complaint:  Atrial Fibrillation   Presents for follow-up visit. Symptoms include dizziness, hypertension and weakness. Symptoms are negative for hypotension, pacemaker problem, syncope and tachycardia. The symptoms have been worsening. Past medical history includes atrial fibrillation.   Hypertension   This is a chronic problem. The current episode started more than 1 year ago. The problem is controlled. Associated symptoms include malaise/fatigue.       72-year-old female who presents today for reassessment.  I have been weaning her off her medications and she is doing significantly better.  Unfortunately she still short of breath and fatigued.  However she has not embarked on a type of exercise program      Review of Systems   Constitution: Positive for weakness and malaise/fatigue.   Cardiovascular: Negative for syncope.   Neurological: Positive for dizziness.   All other systems reviewed and are negative.      Procedures       Objective:     Physical Exam   Constitutional: She is oriented to person, place, and time. She appears well-developed.   HENT:   Head: Normocephalic.   Eyes: Conjunctivae are normal.   Neck: Normal range of motion.   Cardiovascular: Normal rate, regular rhythm and normal heart sounds.    Pulmonary/Chest: Breath sounds normal.   Abdominal: Soft. Bowel sounds are normal.   Musculoskeletal: Normal range of motion. She exhibits no edema.   Neurological: She is alert and oriented to person, place, and time.   Skin: Skin is warm and dry.   Psychiatric: She has a normal mood and affect. Her behavior is normal.   Vitals reviewed.      Lab Review:       Assessment:          Diagnosis Plan   1. Essential hypertension     2. PAF (paroxysmal atrial fibrillation)            Plan:       1.  Paroxysmal atrial fibrillation.  She remains in sinus rhythm.  Unfortunately she does not feel any better.   For the longest time she was very concerned that the atrial fibrillation was why she felt so poorly.  2.  Hypertension.  Blood pressure is okay.    3.  Clinically patient better off a lot of her medications.  At this point she has got to exercise.  I encouraged her try to outline something and will follow-up with her in 3-6 months.      Atrial Fibrillation and Atrial Flutter  Assessment  • The patient has paroxysmal atrial fibrillation  • The patient's CHADS2-VASc score is 3  • A [id number] score of 2 or more is considered a high risk for a thromboembolic event    Plan  • Continue in atrial fibrillation with rate control  • Continue amiodarone for rhythm control  • Add diltiazem for rate control

## 2018-02-03 DIAGNOSIS — M79.7 FIBROMYALGIA: ICD-10-CM

## 2018-02-06 RX ORDER — TRAMADOL HYDROCHLORIDE 50 MG/1
TABLET ORAL
Qty: 120 TABLET | OUTPATIENT
Start: 2018-02-06

## 2018-02-21 ENCOUNTER — OFFICE VISIT (OUTPATIENT)
Dept: FAMILY MEDICINE CLINIC | Facility: CLINIC | Age: 73
End: 2018-02-21

## 2018-02-21 VITALS
OXYGEN SATURATION: 97 % | HEIGHT: 64 IN | SYSTOLIC BLOOD PRESSURE: 120 MMHG | WEIGHT: 177 LBS | BODY MASS INDEX: 30.22 KG/M2 | TEMPERATURE: 98.3 F | RESPIRATION RATE: 16 BRPM | HEART RATE: 68 BPM | DIASTOLIC BLOOD PRESSURE: 70 MMHG

## 2018-02-21 DIAGNOSIS — M79.7 FIBROMYALGIA: Primary | ICD-10-CM

## 2018-02-21 DIAGNOSIS — E78.49 OTHER HYPERLIPIDEMIA: ICD-10-CM

## 2018-02-21 DIAGNOSIS — M79.7 FIBROMYALGIA: ICD-10-CM

## 2018-02-21 DIAGNOSIS — F34.1 DYSTHYMIA: ICD-10-CM

## 2018-02-21 PROCEDURE — 99213 OFFICE O/P EST LOW 20 MIN: CPT | Performed by: PHYSICIAN ASSISTANT

## 2018-02-21 RX ORDER — PRAVASTATIN SODIUM 80 MG/1
80 TABLET ORAL DAILY
Qty: 30 TABLET | Refills: 11 | Status: SHIPPED | OUTPATIENT
Start: 2018-02-21 | End: 2018-08-24 | Stop reason: SDUPTHER

## 2018-02-21 RX ORDER — AMIODARONE HYDROCHLORIDE 200 MG/1
TABLET ORAL
Qty: 30 TABLET | Refills: 2 | Status: SHIPPED | OUTPATIENT
Start: 2018-02-21 | End: 2018-05-09 | Stop reason: SDUPTHER

## 2018-02-21 RX ORDER — TRAMADOL HYDROCHLORIDE 50 MG/1
50 TABLET ORAL EVERY 6 HOURS PRN
Qty: 120 TABLET | Refills: 2 | Status: SHIPPED | OUTPATIENT
Start: 2018-02-21 | End: 2018-05-29 | Stop reason: SDUPTHER

## 2018-02-21 NOTE — PATIENT INSTRUCTIONS
Low glycemic index diet  Exercise 30 minutes most days of the week  Make sure you get results on any labs or tests we ordered today  We discussed medications and how to take them as prescribed  Sleep 6-8 hours each night if possible  If you have not signed up for [employer], please activate your code ASAP from your After Visit Summary today    LDL goal <100  LDL goal if heart disease <70  HDL goal >60  Triglyceride goal <150  BP goal =<130/80  Fasting glucose <100    Contact office if your depression worsens   Go to ER if start to develop feelings for suicide  Take medication as prescribed  If you are having trouble tolerating your medication, contact office before abruptly stopping it

## 2018-02-21 NOTE — PROGRESS NOTES
Subjective   Miguelina Mueller is a 72 y.o. female.     History of Present Illness   Miguelina Mueller 72 y.o. female who presents today for routine follow up check and medication refills.  she has a history of   Patient Active Problem List   Diagnosis   • Anxiety   • Depression   • Fibromyalgia   • Hyperlipidemia   • Essential hypertension   • IFG (impaired fasting glucose)   • Renal insufficiency   • Shoulder bursitis   • Seasonal allergic rhinitis   • PAF (paroxysmal atrial fibrillation)   .  Since the last visit, she has overall felt tired.  She has Impaired fasting glucose and will continue close lab follow up to watch for DMII, Atrial Fibrillation and remains under the care of their cardiologist for medical management and Vitamin D deficiency and well controlled on medication and labs at goal >30.  she has been compliant with current medications have reviewed them.  The patient denies medication side effects.  Need her to restart her statin    Results for orders placed or performed in visit on 11/04/17   Comprehensive metabolic panel   Result Value Ref Range    Glucose 95 65 - 99 mg/dL    BUN 15 8 - 23 mg/dL    Creatinine 1.14 (H) 0.57 - 1.00 mg/dL    eGFR Non African Am 47 (L) >60 mL/min/1.73    eGFR African Am 57 (L) >60 mL/min/1.73    BUN/Creatinine Ratio 13.2 7.0 - 25.0    Sodium 142 136 - 145 mmol/L    Potassium 4.8 3.5 - 5.2 mmol/L    Chloride 102 98 - 107 mmol/L    Total CO2 25.5 22.0 - 29.0 mmol/L    Calcium 9.8 8.6 - 10.5 mg/dL    Total Protein 7.0 6.0 - 8.5 g/dL    Albumin 4.20 3.50 - 5.20 g/dL    Globulin 2.8 gm/dL    A/G Ratio 1.5 g/dL    Total Bilirubin 0.4 0.1 - 1.2 mg/dL    Alkaline Phosphatase 122 (H) 39 - 117 U/L    AST (SGOT) 18 1 - 32 U/L    ALT (SGPT) 14 1 - 33 U/L   Lipid panel   Result Value Ref Range    Total Cholesterol 265 (H) 0 - 200 mg/dL    Triglycerides 173 (H) 0 - 150 mg/dL    HDL Cholesterol 62 (H) 40 - 60 mg/dL    VLDL Cholesterol 34.6 5 - 40 mg/dL    LDL Cholesterol  168 (H) 0 - 100  mg/dL   Hemoglobin A1c   Result Value Ref Range    Hemoglobin A1C 5.41 4.80 - 5.60 %     She is off Xarelto   I see she is not taking Pravastatin and need her to restart 80mg    Cymbalta does help fibro pain some;  Also helps depression some but not 100%    Ultram for Fibro pain  The patient has read and signed the [facility] Controlled Substance Contract.  I will continue to see patient for regular follow up appointments.  They are well controlled on their medication.  NATA has been reviewed by me and is updated every 3 months. The patient is aware of the potential for addiction and dependence.    The following portions of the patient's history were reviewed and updated as appropriate: allergies, current medications, past family history, past medical history, past social history, past surgical history and problem list.    Review of Systems   Constitutional: Positive for fatigue. Negative for activity change, appetite change and unexpected weight change.   HENT: Negative for nosebleeds and trouble swallowing.    Eyes: Negative for pain and visual disturbance.   Respiratory: Negative for chest tightness, shortness of breath and wheezing.    Cardiovascular: Negative for chest pain and palpitations.   Gastrointestinal: Negative for abdominal pain and blood in stool.   Endocrine: Negative.    Genitourinary: Negative for difficulty urinating and hematuria.   Musculoskeletal: Positive for myalgias. Negative for joint swelling.   Skin: Negative for color change and rash.   Allergic/Immunologic: Negative.    Neurological: Negative for syncope and speech difficulty.   Hematological: Negative for adenopathy.   Psychiatric/Behavioral: Positive for dysphoric mood and sleep disturbance. Negative for agitation and confusion.   All other systems reviewed and are negative.      Objective   Physical Exam   Constitutional: She is oriented to person, place, and time. She appears well-developed and well-nourished. No distress.    HENT:   Head: Normocephalic and atraumatic.   Eyes: Conjunctivae and EOM are normal. Pupils are equal, round, and reactive to light. Right eye exhibits no discharge. Left eye exhibits no discharge. No scleral icterus.   Neck: Normal range of motion. Neck supple. No tracheal deviation present. No thyromegaly present.   Cardiovascular: Normal rate, regular rhythm, normal heart sounds, intact distal pulses and normal pulses.  Exam reveals no gallop.    No murmur heard.  Pulmonary/Chest: Effort normal and breath sounds normal. No respiratory distress. She has no wheezes. She has no rales.   Musculoskeletal: Normal range of motion.   Neurological: She is alert and oriented to person, place, and time. She exhibits normal muscle tone. Coordination normal.   Skin: Skin is warm. No rash noted. No erythema. No pallor.   Left great toenail irreg and thick; toenail fungus and wait to Rx   Psychiatric: She has a normal mood and affect. Her behavior is normal. Judgment and thought content normal.   Nursing note and vitals reviewed.      Assessment/Plan   Miguelina was seen today for hypertension.    Diagnoses and all orders for this visit:    Fibromyalgia    Dysthymia    Other hyperlipidemia    Other orders  -     pravastatin (PRAVACHOL) 80 MG tablet; Take 1 tablet by mouth Daily. For cholesterol

## 2018-03-14 ENCOUNTER — OFFICE VISIT (OUTPATIENT)
Dept: FAMILY MEDICINE CLINIC | Facility: CLINIC | Age: 73
End: 2018-03-14

## 2018-03-14 VITALS
TEMPERATURE: 98.2 F | HEIGHT: 64 IN | RESPIRATION RATE: 16 BRPM | HEART RATE: 86 BPM | DIASTOLIC BLOOD PRESSURE: 80 MMHG | BODY MASS INDEX: 30.22 KG/M2 | WEIGHT: 177 LBS | SYSTOLIC BLOOD PRESSURE: 130 MMHG | OXYGEN SATURATION: 93 %

## 2018-03-14 DIAGNOSIS — J01.90 ACUTE SINUSITIS, RECURRENCE NOT SPECIFIED, UNSPECIFIED LOCATION: ICD-10-CM

## 2018-03-14 DIAGNOSIS — J20.9 ACUTE BRONCHITIS DUE TO INFECTION: Primary | ICD-10-CM

## 2018-03-14 PROCEDURE — 71046 X-RAY EXAM CHEST 2 VIEWS: CPT | Performed by: PHYSICIAN ASSISTANT

## 2018-03-14 PROCEDURE — 99213 OFFICE O/P EST LOW 20 MIN: CPT | Performed by: PHYSICIAN ASSISTANT

## 2018-03-14 RX ORDER — DEXTROMETHORPHAN HYDROBROMIDE AND PROMETHAZINE HYDROCHLORIDE 15; 6.25 MG/5ML; MG/5ML
5 SYRUP ORAL 4 TIMES DAILY PRN
Qty: 180 ML | Refills: 1 | Status: SHIPPED | OUTPATIENT
Start: 2018-03-14 | End: 2018-04-12 | Stop reason: ALTCHOICE

## 2018-03-14 RX ORDER — PREDNISONE 10 MG/1
10 TABLET ORAL
Qty: 15 TABLET | Refills: 0 | Status: SHIPPED | OUTPATIENT
Start: 2018-03-14 | End: 2018-04-12 | Stop reason: ALTCHOICE

## 2018-03-14 RX ORDER — AMOXICILLIN AND CLAVULANATE POTASSIUM 875; 125 MG/1; MG/1
1 TABLET, FILM COATED ORAL EVERY 12 HOURS SCHEDULED
Qty: 20 TABLET | Refills: 1 | Status: SHIPPED | OUTPATIENT
Start: 2018-03-14 | End: 2018-04-12

## 2018-03-14 NOTE — PROGRESS NOTES
Subjective   Miguelina Mueller is a 72 y.o. female.     History of Present Illness   Miguelina Mueller 72 y.o. female who presents for evaluation of upper respiratory congestion, acute bronchitis. Symptoms include ear pressure, congestion, post nasal drip, cough described as productive of green sputum, fatigue and wheezing.  Onset of symptoms was 12 days ago, unchanged since that time. Patient denies fever.   Evaluation to date: none Treatment to date:  OTC cough suppressant.     Had Zpak in Dec and did resolve  The following portions of the patient's history were reviewed and updated as appropriate: allergies, current medications, past family history, past medical history, past social history, past surgical history and problem list.    Review of Systems   Constitutional: Positive for fatigue. Negative for activity change and unexpected weight change.   HENT: Positive for congestion, ear pain and postnasal drip.    Eyes: Negative for pain and discharge.   Respiratory: Positive for cough. Negative for chest tightness, shortness of breath and wheezing.    Cardiovascular: Negative for chest pain and palpitations.   Gastrointestinal: Negative for abdominal pain, diarrhea and vomiting.   Endocrine: Negative.    Genitourinary: Negative.    Musculoskeletal: Negative for joint swelling.   Skin: Negative for color change, rash and wound.   Allergic/Immunologic: Negative.    Neurological: Negative for seizures and syncope.   Psychiatric/Behavioral: Negative.        Objective   Physical Exam   Constitutional: She is oriented to person, place, and time. She appears well-developed and well-nourished.  Non-toxic appearance. No distress.   HENT:   Head: Normocephalic and atraumatic. Hair is normal.   Right Ear: External ear normal. No drainage, swelling or tenderness. Tympanic membrane is retracted.   Left Ear: External ear normal. No drainage, swelling or tenderness. Tympanic membrane is retracted.   Nose: Mucosal edema present. No  epistaxis.   Mouth/Throat: Uvula is midline and mucous membranes are normal. No oral lesions. No uvula swelling. Posterior oropharyngeal erythema present. No oropharyngeal exudate.   Eyes: Conjunctivae and EOM are normal. Pupils are equal, round, and reactive to light. Right eye exhibits no discharge. Left eye exhibits no discharge. No scleral icterus.   Neck: Normal range of motion. Neck supple.   Cardiovascular: Normal rate, regular rhythm and normal heart sounds.  Exam reveals no gallop.    No murmur heard.  Pulmonary/Chest: No stridor. No respiratory distress. She has wheezes. She has no rales. She exhibits no tenderness.   Abdominal: Soft. There is no tenderness.   Lymphadenopathy:     She has no cervical adenopathy.   Neurological: She is alert and oriented to person, place, and time. She exhibits normal muscle tone.   Skin: Skin is warm and dry. No rash noted. She is not diaphoretic.   Psychiatric: She has a normal mood and affect. Her behavior is normal. Judgment and thought content normal.   Nursing note and vitals reviewed.      Assessment/Plan   Miguelina was seen today for cough.    Diagnoses and all orders for this visit:    Acute bronchitis due to infection  -     XR Chest PA & Lateral    Acute sinusitis, recurrence not specified, unspecified location    Other orders  -     predniSONE (DELTASONE) 10 MG tablet; Take 1 tablet by mouth 3 (Three) Times a Day With Meals.  -     amoxicillin-clavulanate (AUGMENTIN) 875-125 MG per tablet; Take 1 tablet by mouth Every 12 (Twelve) Hours. One PO BID for infection with food  -     promethazine-dextromethorphan (PROMETHAZINE-DM) 6.25-15 MG/5ML syrup; Take 5 mL by mouth 4 (Four) Times a Day As Needed for Cough.

## 2018-04-04 DIAGNOSIS — M79.7 FIBROMYALGIA: ICD-10-CM

## 2018-04-04 RX ORDER — TRAMADOL HYDROCHLORIDE 50 MG/1
TABLET ORAL
Qty: 120 TABLET | OUTPATIENT
Start: 2018-04-04

## 2018-04-05 RX ORDER — TRAMADOL HYDROCHLORIDE 50 MG/1
TABLET ORAL
Qty: 120 TABLET | OUTPATIENT
Start: 2018-04-05

## 2018-04-12 ENCOUNTER — OFFICE VISIT (OUTPATIENT)
Dept: CARDIOLOGY | Facility: CLINIC | Age: 73
End: 2018-04-12

## 2018-04-12 VITALS
DIASTOLIC BLOOD PRESSURE: 84 MMHG | WEIGHT: 177 LBS | BODY MASS INDEX: 30.22 KG/M2 | SYSTOLIC BLOOD PRESSURE: 144 MMHG | HEART RATE: 72 BPM | HEIGHT: 64 IN

## 2018-04-12 DIAGNOSIS — I48.0 PAF (PAROXYSMAL ATRIAL FIBRILLATION) (HCC): Primary | ICD-10-CM

## 2018-04-12 DIAGNOSIS — I10 ESSENTIAL HYPERTENSION: ICD-10-CM

## 2018-04-12 PROCEDURE — 93000 ELECTROCARDIOGRAM COMPLETE: CPT | Performed by: INTERNAL MEDICINE

## 2018-04-12 PROCEDURE — 99213 OFFICE O/P EST LOW 20 MIN: CPT | Performed by: INTERNAL MEDICINE

## 2018-04-13 NOTE — PROGRESS NOTES
Subjective:     Encounter Date:01/18/2018      Patient ID: Miguelina Mueller is a 73 y.o. female.    Chief Complaint:  Hypertension   This is a chronic problem. The current episode started more than 1 year ago. The problem is controlled. Associated symptoms include malaise/fatigue.   Atrial Fibrillation   Presents for follow-up visit. Symptoms include dizziness, hypertension and weakness. Symptoms are negative for hypotension, pacemaker problem, syncope and tachycardia. The symptoms have been worsening. Past medical history includes atrial fibrillation.       72-year-old female who presents today for reassessment.All in all patient says that she still very very fatigued but this has been chronic for her for many years.  She appears to be back to baseline however which unfortunately because the best regarding kit.  Her blood pressure is doing well I encourage her to continue try to exercise which she's doing.      Review of Systems   Constitution: Positive for weakness and malaise/fatigue.   Cardiovascular: Negative for syncope.   Neurological: Positive for dizziness.   All other systems reviewed and are negative.        ECG 12 Lead  Date/Time: 4/12/2018 4:18 PM  Performed by: KISHORE RUEDA  Authorized by: KISHORE RUEDA   Comparison: compared with previous ECG from 11/16/2017  Similar to previous ECG  Rhythm: sinus rhythm  Clinical impression: normal ECG               Objective:     Physical Exam   Constitutional: She is oriented to person, place, and time. She appears well-developed.   HENT:   Head: Normocephalic.   Eyes: Conjunctivae are normal.   Neck: Normal range of motion.   Cardiovascular: Normal rate, regular rhythm and normal heart sounds.    Pulmonary/Chest: Breath sounds normal.   Abdominal: Soft. Bowel sounds are normal.   Musculoskeletal: Normal range of motion. She exhibits no edema.   Neurological: She is alert and oriented to person, place, and time.   Skin: Skin is warm and dry.    Psychiatric: She has a normal mood and affect. Her behavior is normal.   Vitals reviewed.      Lab Review:       Assessment:          Diagnosis Plan   1. PAF (paroxysmal atrial fibrillation)     2. Essential hypertension            Plan:       1.  Paroxysmal atrial fibrillation.  She remains in sinus rhythm.  I think patient is back to baseline.  2.  Hypertension.  Blood pressure is okay.    3.   at this point I think she is back to baseline continue the same encouraged continued exercise will follow her clinically.  Patient told to follow-up in 6-12 months      Atrial Fibrillation and Atrial Flutter  Assessment  • The patient has paroxysmal atrial fibrillation  • The patient's CHADS2-VASc score is 3  • A [id number]-NMDh score of 2 or more is considered a high risk for a thromboembolic event    Plan  • Continue in atrial fibrillation with rate control  • Continue amiodarone for rhythm control  • Add diltiazem for rate control

## 2018-04-27 RX ORDER — AMIODARONE HYDROCHLORIDE 200 MG/1
TABLET ORAL
Qty: 30 TABLET | Status: CANCELLED | OUTPATIENT
Start: 2018-04-27

## 2018-05-02 DIAGNOSIS — M79.7 FIBROMYALGIA: ICD-10-CM

## 2018-05-02 RX ORDER — TRAMADOL HYDROCHLORIDE 50 MG/1
TABLET ORAL
Qty: 120 TABLET | OUTPATIENT
Start: 2018-05-02

## 2018-05-03 DIAGNOSIS — I48.0 PAF (PAROXYSMAL ATRIAL FIBRILLATION) (HCC): Primary | ICD-10-CM

## 2018-05-09 ENCOUNTER — TELEPHONE (OUTPATIENT)
Dept: CARDIOLOGY | Facility: CLINIC | Age: 73
End: 2018-05-09

## 2018-05-09 RX ORDER — AMIODARONE HYDROCHLORIDE 200 MG/1
200 TABLET ORAL DAILY
Qty: 90 TABLET | Refills: 1 | Status: SHIPPED | OUTPATIENT
Start: 2018-05-09 | End: 2018-12-27 | Stop reason: SDUPTHER

## 2018-05-09 NOTE — TELEPHONE ENCOUNTER
Labs received from [facility].  In your box for review.   Please let me know if OK to refill her Amiodarone.    Thanks,  Vicki

## 2018-05-29 ENCOUNTER — OFFICE VISIT (OUTPATIENT)
Dept: FAMILY MEDICINE CLINIC | Facility: CLINIC | Age: 73
End: 2018-05-29

## 2018-05-29 VITALS
HEART RATE: 77 BPM | BODY MASS INDEX: 30.9 KG/M2 | WEIGHT: 181 LBS | DIASTOLIC BLOOD PRESSURE: 60 MMHG | TEMPERATURE: 98.1 F | OXYGEN SATURATION: 96 % | SYSTOLIC BLOOD PRESSURE: 122 MMHG | HEIGHT: 64 IN | RESPIRATION RATE: 16 BRPM

## 2018-05-29 DIAGNOSIS — M79.7 FIBROMYALGIA: ICD-10-CM

## 2018-05-29 DIAGNOSIS — F41.9 ANXIETY: ICD-10-CM

## 2018-05-29 DIAGNOSIS — M79.7 FIBROMYALGIA: Primary | ICD-10-CM

## 2018-05-29 DIAGNOSIS — F34.1 DYSTHYMIA: ICD-10-CM

## 2018-05-29 DIAGNOSIS — E78.49 OTHER HYPERLIPIDEMIA: ICD-10-CM

## 2018-05-29 DIAGNOSIS — R73.01 IFG (IMPAIRED FASTING GLUCOSE): ICD-10-CM

## 2018-05-29 DIAGNOSIS — N18.30 CKD (CHRONIC KIDNEY DISEASE) STAGE 3, GFR 30-59 ML/MIN (HCC): ICD-10-CM

## 2018-05-29 DIAGNOSIS — E55.9 VITAMIN D DEFICIENCY: ICD-10-CM

## 2018-05-29 PROCEDURE — 99214 OFFICE O/P EST MOD 30 MIN: CPT | Performed by: PHYSICIAN ASSISTANT

## 2018-05-29 RX ORDER — TRAMADOL HYDROCHLORIDE 50 MG/1
50 TABLET ORAL EVERY 6 HOURS PRN
Qty: 120 TABLET | Refills: 2 | Status: SHIPPED | OUTPATIENT
Start: 2018-05-29 | End: 2018-08-24 | Stop reason: SDUPTHER

## 2018-05-29 RX ORDER — CHOLECALCIFEROL (VITAMIN D3) 50 MCG
CAPSULE ORAL DAILY
Refills: 3 | COMMUNITY
Start: 2018-04-27 | End: 2018-08-24 | Stop reason: SDUPTHER

## 2018-05-29 NOTE — PATIENT INSTRUCTIONS
Low glycemic index diet  Exercise 30 minutes most days of the week  Make sure you get results on any labs or tests we ordered today  We discussed medications and how to take them as prescribed  Sleep 6-8 hours each night if possible  If you have not signed up for [employer], please activate your code ASAP from your After Visit Summary today    LDL goal <100  LDL goal if heart disease <70  HDL goal >60  Triglyceride goal <150  BP goal =<130/80  Fasting glucose <100    Contact office if your depression worsens   Go to ER if start to develop feelings for suicide  Take medication as prescribed  If you are having trouble tolerating your medication, contact office before abruptly stopping it

## 2018-05-29 NOTE — PROGRESS NOTES
Subjective   Miguelina Mueller is a 73 y.o. female.     History of Present Illness   Miguelina Mueller 73 y.o. female who presents today for routine follow up check and medication refills.  she has a history of   Patient Active Problem List   Diagnosis   • Anxiety   • Depression   • Fibromyalgia   • Hyperlipidemia   • Essential hypertension   • IFG (impaired fasting glucose)   • Renal insufficiency   • Shoulder bursitis   • Seasonal allergic rhinitis   • PAF (paroxysmal atrial fibrillation)   .  Since the last visit, she has overall felt tired.  She has cholesterol was up last time and restarted Pravastatin and needs labs to follow..  she has been compliant with current medications have reviewed them.  The patient denies medication side effects.    Results for orders placed or performed in visit on 11/04/17   Comprehensive metabolic panel   Result Value Ref Range    Glucose 95 65 - 99 mg/dL    BUN 15 8 - 23 mg/dL    Creatinine 1.14 (H) 0.57 - 1.00 mg/dL    eGFR Non African Am 47 (L) >60 mL/min/1.73    eGFR African Am 57 (L) >60 mL/min/1.73    BUN/Creatinine Ratio 13.2 7.0 - 25.0    Sodium 142 136 - 145 mmol/L    Potassium 4.8 3.5 - 5.2 mmol/L    Chloride 102 98 - 107 mmol/L    Total CO2 25.5 22.0 - 29.0 mmol/L    Calcium 9.8 8.6 - 10.5 mg/dL    Total Protein 7.0 6.0 - 8.5 g/dL    Albumin 4.20 3.50 - 5.20 g/dL    Globulin 2.8 gm/dL    A/G Ratio 1.5 g/dL    Total Bilirubin 0.4 0.1 - 1.2 mg/dL    Alkaline Phosphatase 122 (H) 39 - 117 U/L    AST (SGOT) 18 1 - 32 U/L    ALT (SGPT) 14 1 - 33 U/L   Lipid panel   Result Value Ref Range    Total Cholesterol 265 (H) 0 - 200 mg/dL    Triglycerides 173 (H) 0 - 150 mg/dL    HDL Cholesterol 62 (H) 40 - 60 mg/dL    VLDL Cholesterol 34.6 5 - 40 mg/dL    LDL Cholesterol  168 (H) 0 - 100 mg/dL   Hemoglobin A1c   Result Value Ref Range    Hemoglobin A1C 5.41 4.80 - 5.60 %       I did Rx Crestor and ? Intolerant  DR Jazzmine rojo did labs and GFR stable at 50; thyroid labs normal--just saw  kwan    Miguelina Mueller female 73 y.o. who presents today for follow up of Depression and Anxiety.  She reports medication does help and this is her best med and tried several. Onset of symptoms was approximately several years ago.  She denies current suicidal and homicidal ideation. Risk factors are chronic illness and chronic pain.  Previous treatment includes current Rx.  She complains of the following medication side effects: none.  The patient declines to go to counseling..    She remains on Ultram for Fibro pain and does help and tolerates it well.  The patient has read and signed the [facility] Controlled Substance Contract.  I will continue to see patient for regular follow up appointments.  They are well controlled on their medication.  NATA has been reviewed by me and is updated every 3 months. The patient is aware of the potential for addiction and dependence.      The following portions of the patient's history were reviewed and updated as appropriate: allergies, current medications, past family history, past medical history, past social history, past surgical history and problem list.    Review of Systems   Constitutional: Positive for fatigue. Negative for activity change, appetite change and unexpected weight change.   HENT: Negative for nosebleeds and trouble swallowing.    Eyes: Negative for pain and visual disturbance.   Respiratory: Negative for chest tightness, shortness of breath and wheezing.    Cardiovascular: Negative for chest pain and palpitations.   Gastrointestinal: Negative for abdominal pain and blood in stool.   Endocrine: Negative.    Genitourinary: Negative for difficulty urinating and hematuria.   Musculoskeletal: Positive for myalgias. Negative for joint swelling.   Skin: Negative for color change and rash.   Allergic/Immunologic: Negative.    Neurological: Negative for syncope and speech difficulty.   Hematological: Negative for adenopathy.   Psychiatric/Behavioral: Positive  for dysphoric mood and sleep disturbance. Negative for agitation and confusion.   All other systems reviewed and are negative.      Objective   Physical Exam   Constitutional: She is oriented to person, place, and time. She appears well-developed and well-nourished. No distress.   HENT:   Head: Normocephalic and atraumatic.   Eyes: Conjunctivae and EOM are normal. Pupils are equal, round, and reactive to light. Right eye exhibits no discharge. Left eye exhibits no discharge. No scleral icterus.   Neck: Normal range of motion. Neck supple. No tracheal deviation present. No thyromegaly present.   Cardiovascular: Normal rate, regular rhythm, normal heart sounds, intact distal pulses and normal pulses.  Exam reveals no gallop.    No murmur heard.  Pulmonary/Chest: Effort normal and breath sounds normal. No respiratory distress. She has no wheezes. She has no rales.   Musculoskeletal: Normal range of motion.   Neurological: She is alert and oriented to person, place, and time. She exhibits normal muscle tone. Coordination normal.   Skin: Skin is warm. No rash noted. No erythema. No pallor.   Left great toenail irreg and thick; toenail fungus and wait to Rx   Psychiatric: She has a normal mood and affect. Her behavior is normal. Judgment and thought content normal.   Nursing note and vitals reviewed.      Assessment/Plan   Miguelina was seen today for hypertension.    Diagnoses and all orders for this visit:    IFG (impaired fasting glucose)  -     Comprehensive metabolic panel; Future  -     Lipid panel; Future  -     CBC & Differential; Future  -     Vitamin D 25 Hydroxy; Future  -     Vitamin B12; Future  -     Folate; Future  -     Hemoglobin A1c; Future    Anxiety  -     Comprehensive metabolic panel; Future  -     Lipid panel; Future  -     CBC & Differential; Future  -     Vitamin D 25 Hydroxy; Future  -     Vitamin B12; Future  -     Folate; Future  -     Hemoglobin A1c; Future    Dysthymia  -     Comprehensive  metabolic panel; Future  -     Lipid panel; Future  -     CBC & Differential; Future  -     Vitamin D 25 Hydroxy; Future  -     Vitamin B12; Future  -     Folate; Future  -     Hemoglobin A1c; Future    Fibromyalgia  -     Comprehensive metabolic panel; Future  -     Lipid panel; Future  -     CBC & Differential; Future  -     Vitamin D 25 Hydroxy; Future  -     Vitamin B12; Future  -     Folate; Future  -     Hemoglobin A1c; Future    Other hyperlipidemia  -     Comprehensive metabolic panel; Future  -     Lipid panel; Future  -     CBC & Differential; Future  -     Vitamin D 25 Hydroxy; Future  -     Vitamin B12; Future  -     Folate; Future  -     Hemoglobin A1c; Future    Vitamin D deficiency  -     Comprehensive metabolic panel; Future  -     Lipid panel; Future  -     CBC & Differential; Future  -     Vitamin D 25 Hydroxy; Future  -     Vitamin B12; Future  -     Folate; Future  -     Hemoglobin A1c; Future

## 2018-07-27 DIAGNOSIS — M79.7 FIBROMYALGIA: ICD-10-CM

## 2018-07-27 RX ORDER — TRAMADOL HYDROCHLORIDE 50 MG/1
TABLET ORAL
Qty: 120 TABLET | OUTPATIENT
Start: 2018-07-27

## 2018-08-01 ENCOUNTER — RESULTS ENCOUNTER (OUTPATIENT)
Dept: FAMILY MEDICINE CLINIC | Facility: CLINIC | Age: 73
End: 2018-08-01

## 2018-08-01 DIAGNOSIS — F34.1 DYSTHYMIA: ICD-10-CM

## 2018-08-01 DIAGNOSIS — E78.49 OTHER HYPERLIPIDEMIA: ICD-10-CM

## 2018-08-01 DIAGNOSIS — R73.01 IFG (IMPAIRED FASTING GLUCOSE): ICD-10-CM

## 2018-08-01 DIAGNOSIS — F41.9 ANXIETY: ICD-10-CM

## 2018-08-01 DIAGNOSIS — M79.7 FIBROMYALGIA: ICD-10-CM

## 2018-08-01 DIAGNOSIS — E55.9 VITAMIN D DEFICIENCY: ICD-10-CM

## 2018-08-16 RX ORDER — AMOXICILLIN AND CLAVULANATE POTASSIUM 875; 125 MG/1; MG/1
TABLET, FILM COATED ORAL
Qty: 20 TABLET | Refills: 0 | Status: SHIPPED | OUTPATIENT
Start: 2018-08-16 | End: 2018-08-24

## 2018-08-17 LAB
25(OH)D3+25(OH)D2 SERPL-MCNC: 50.1 NG/ML (ref 30–100)
ALBUMIN SERPL-MCNC: 3.7 G/DL (ref 3.5–5.2)
ALBUMIN/GLOB SERPL: 1.5 G/DL
ALP SERPL-CCNC: 120 U/L (ref 39–117)
ALT SERPL-CCNC: 16 U/L (ref 1–33)
AST SERPL-CCNC: 15 U/L (ref 1–32)
BASOPHILS # BLD AUTO: 0.07 10*3/MM3 (ref 0–0.2)
BASOPHILS NFR BLD AUTO: 0.8 % (ref 0–1.5)
BILIRUB SERPL-MCNC: 0.4 MG/DL (ref 0.1–1.2)
BUN SERPL-MCNC: 16 MG/DL (ref 8–23)
BUN/CREAT SERPL: 13.9 (ref 7–25)
CALCIUM SERPL-MCNC: 9.3 MG/DL (ref 8.6–10.5)
CHLORIDE SERPL-SCNC: 107 MMOL/L (ref 98–107)
CHOLEST SERPL-MCNC: 181 MG/DL (ref 0–200)
CO2 SERPL-SCNC: 25.5 MMOL/L (ref 22–29)
CREAT SERPL-MCNC: 1.15 MG/DL (ref 0.57–1)
EOSINOPHIL # BLD AUTO: 0.31 10*3/MM3 (ref 0–0.7)
EOSINOPHIL NFR BLD AUTO: 3.7 % (ref 0.3–6.2)
ERYTHROCYTE [DISTWIDTH] IN BLOOD BY AUTOMATED COUNT: 13.8 % (ref 11.7–13)
FOLATE SERPL-MCNC: 14.9 NG/ML (ref 4.78–24.2)
GLOBULIN SER CALC-MCNC: 2.5 GM/DL
GLUCOSE SERPL-MCNC: 108 MG/DL (ref 65–99)
HBA1C MFR BLD: 5.6 % (ref 4.8–5.6)
HCT VFR BLD AUTO: 45.8 % (ref 35.6–45.5)
HDLC SERPL-MCNC: 61 MG/DL (ref 40–60)
HGB BLD-MCNC: 14.9 G/DL (ref 11.9–15.5)
IMM GRANULOCYTES # BLD: 0.02 10*3/MM3 (ref 0–0.03)
IMM GRANULOCYTES NFR BLD: 0.2 % (ref 0–0.5)
LDLC SERPL CALC-MCNC: 102 MG/DL (ref 0–100)
LYMPHOCYTES # BLD AUTO: 2.01 10*3/MM3 (ref 0.9–4.8)
LYMPHOCYTES NFR BLD AUTO: 24.2 % (ref 19.6–45.3)
MCH RBC QN AUTO: 30.8 PG (ref 26.9–32)
MCHC RBC AUTO-ENTMCNC: 32.5 G/DL (ref 32.4–36.3)
MCV RBC AUTO: 94.8 FL (ref 80.5–98.2)
MONOCYTES # BLD AUTO: 0.54 10*3/MM3 (ref 0.2–1.2)
MONOCYTES NFR BLD AUTO: 6.5 % (ref 5–12)
NEUTROPHILS # BLD AUTO: 5.37 10*3/MM3 (ref 1.9–8.1)
NEUTROPHILS NFR BLD AUTO: 64.8 % (ref 42.7–76)
PLATELET # BLD AUTO: 200 10*3/MM3 (ref 140–500)
POTASSIUM SERPL-SCNC: 4.5 MMOL/L (ref 3.5–5.2)
PROT SERPL-MCNC: 6.2 G/DL (ref 6–8.5)
RBC # BLD AUTO: 4.83 10*6/MM3 (ref 3.9–5.2)
SODIUM SERPL-SCNC: 145 MMOL/L (ref 136–145)
TRIGL SERPL-MCNC: 89 MG/DL (ref 0–150)
VIT B12 SERPL-MCNC: 251 PG/ML (ref 211–946)
VLDLC SERPL CALC-MCNC: 17.8 MG/DL (ref 5–40)
WBC # BLD AUTO: 8.3 10*3/MM3 (ref 4.5–10.7)

## 2018-08-19 RX ORDER — MAGNESIUM 200 MG
1000 TABLET ORAL DAILY
Qty: 90 EACH | Refills: 3 | Status: SHIPPED | OUTPATIENT
Start: 2018-08-19 | End: 2019-02-13 | Stop reason: SDUPTHER

## 2018-08-24 ENCOUNTER — OFFICE VISIT (OUTPATIENT)
Dept: FAMILY MEDICINE CLINIC | Facility: CLINIC | Age: 73
End: 2018-08-24

## 2018-08-24 VITALS
HEART RATE: 69 BPM | HEIGHT: 64 IN | OXYGEN SATURATION: 95 % | DIASTOLIC BLOOD PRESSURE: 80 MMHG | TEMPERATURE: 97.7 F | WEIGHT: 184 LBS | SYSTOLIC BLOOD PRESSURE: 130 MMHG | RESPIRATION RATE: 16 BRPM | BODY MASS INDEX: 31.41 KG/M2

## 2018-08-24 DIAGNOSIS — R73.01 IFG (IMPAIRED FASTING GLUCOSE): ICD-10-CM

## 2018-08-24 DIAGNOSIS — M79.7 FIBROMYALGIA: ICD-10-CM

## 2018-08-24 DIAGNOSIS — N18.30 CKD (CHRONIC KIDNEY DISEASE) STAGE 3, GFR 30-59 ML/MIN (HCC): ICD-10-CM

## 2018-08-24 DIAGNOSIS — E78.49 OTHER HYPERLIPIDEMIA: ICD-10-CM

## 2018-08-24 DIAGNOSIS — E53.8 B12 DEFICIENCY: ICD-10-CM

## 2018-08-24 DIAGNOSIS — F34.1 DYSTHYMIA: ICD-10-CM

## 2018-08-24 DIAGNOSIS — E55.9 VITAMIN D DEFICIENCY: ICD-10-CM

## 2018-08-24 DIAGNOSIS — Z00.00 MEDICARE ANNUAL WELLNESS VISIT, SUBSEQUENT: Primary | ICD-10-CM

## 2018-08-24 PROCEDURE — G0439 PPPS, SUBSEQ VISIT: HCPCS | Performed by: PHYSICIAN ASSISTANT

## 2018-08-24 PROCEDURE — 99214 OFFICE O/P EST MOD 30 MIN: CPT | Performed by: PHYSICIAN ASSISTANT

## 2018-08-24 RX ORDER — DULOXETIN HYDROCHLORIDE 60 MG/1
60 CAPSULE, DELAYED RELEASE ORAL DAILY
Qty: 30 CAPSULE | Refills: 11 | Status: SHIPPED | OUTPATIENT
Start: 2018-08-24 | End: 2019-02-13 | Stop reason: SDUPTHER

## 2018-08-24 RX ORDER — PRAVASTATIN SODIUM 80 MG/1
80 TABLET ORAL DAILY
Qty: 30 TABLET | Refills: 11 | Status: SHIPPED | OUTPATIENT
Start: 2018-08-24 | End: 2019-02-13 | Stop reason: SDUPTHER

## 2018-08-24 RX ORDER — TRAMADOL HYDROCHLORIDE 50 MG/1
50 TABLET ORAL EVERY 6 HOURS PRN
Qty: 120 TABLET | Refills: 2 | Status: SHIPPED | OUTPATIENT
Start: 2018-08-24 | End: 2018-10-20 | Stop reason: SDUPTHER

## 2018-08-24 NOTE — PROGRESS NOTES
QUICK REFERENCE INFORMATION:  The ABCs of the Annual Wellness Visit    Subsequent Medicare Wellness Visit    HEALTH RISK ASSESSMENT    1945    Recent Hospitalizations:  No hospitalization(s) within the last year..        Current Medical Providers:  Patient Care Team:  Yuki Pennington PA-C as PCP - General  Yuki Pennington PA-C as PCP - Family Medicine  Rajesh Dover MD as PCP - Claims Attributed  Rajesh Dover MD as Consulting Physician (Cardiology)        Smoking Status:  History   Smoking Status   • Former Smoker   • Packs/day: 0.25   Smokeless Tobacco   • Never Used     Comment: quit around 2007       Alcohol Consumption:  History   Alcohol Use   • Yes     Comment: rare    ///     caffeine use       Depression Screen:   PHQ-2/PHQ-9 Depression Screening 8/24/2018   Little interest or pleasure in doing things 0   Feeling down, depressed, or hopeless 0   Total Score 0       Health Habits and Functional and Cognitive Screening:  Functional & Cognitive Status 8/24/2018   Do you have difficulty preparing food and eating? No   Do you have difficulty bathing yourself, getting dressed or grooming yourself? No   Do you have difficulty using the toilet? No   Do you have difficulty moving around from place to place? No   Do you have trouble with steps or getting out of a bed or a chair? No   In the past year have you fallen or experienced a near fall? No   Current Diet Low Fat Diet   Dental Exam Not up to date   Eye Exam Not up to date   Exercise (times per week) 3 times per week   Current Exercise Activities Include Walking   Do you need help using the phone?  No   Are you deaf or do you have serious difficulty hearing?  No   Do you need help with transportation? No   Do you need help shopping? No   Do you need help preparing meals?  No   Do you need help with housework?  No   Do you need help with laundry? No   Do you need help taking your medications? No   Do you need help managing money? No   Do you ever  drive or ride in a car without wearing a seat belt? No   Have you felt unusual stress, anger or loneliness in the last month? No   Who do you live with? Spouse   If you need help, do you have trouble finding someone available to you? No   Have you been bothered in the last four weeks by sexual problems? No   Do you have difficulty concentrating, remembering or making decisions? No           Does the patient have evidence of cognitive impairment? No    Aspirin use counseling: not on it      Recent Lab Results:  CMP:  Lab Results   Component Value Date     (H) 08/17/2018    BUN 16 08/17/2018    CREATININE 1.15 (H) 08/17/2018    EGFRIFNONA 47 (L) 08/17/2018    EGFRIFAFRI 56 (L) 08/17/2018    BCR 13.9 08/17/2018     08/17/2018    K 4.5 08/17/2018    CO2 25.5 08/17/2018    CALCIUM 9.3 08/17/2018    PROTENTOTREF 6.2 08/17/2018    ALBUMIN 3.70 08/17/2018    LABGLOBREF 2.5 08/17/2018    LABIL2 1.5 08/17/2018    BILITOT 0.4 08/17/2018    ALKPHOS 120 (H) 08/17/2018    AST 15 08/17/2018    ALT 16 08/17/2018     Lipid Panel:  Lab Results   Component Value Date    TRIG 89 08/17/2018    HDL 61 (H) 08/17/2018    VLDL 17.8 08/17/2018     HbA1c:  Lab Results   Component Value Date    HGBA1C 5.60 08/17/2018       Visual Acuity:  No exam data present    Age-appropriate Screening Schedule:  Refer to the list below for future screening recommendations based on patient's age, sex and/or medical conditions. Orders for these recommended tests are listed in the plan section. The patient has been provided with a written plan.    Health Maintenance   Topic Date Due   • ZOSTER VACCINE (2 of 2) 03/03/2017   • DXA SCAN  07/19/2018   • INFLUENZA VACCINE  08/01/2018   • LIPID PANEL  08/17/2019   • MAMMOGRAM  09/13/2019   • COLONOSCOPY  02/09/2021   • TDAP/TD VACCINES (2 - Td) 10/12/2026   • PNEUMOCOCCAL VACCINES (65+ LOW/MEDIUM RISK)  Completed        Subjective   History of Present Illness    Miguelina Mueller is a 73 y.o. female who  presents for an Subsequent Wellness Visit.    The following portions of the patient's history were reviewed and updated as appropriate: allergies, current medications, past family history, past medical history, past social history, past surgical history and problem list.    Outpatient Medications Prior to Visit   Medication Sig Dispense Refill   • amiodarone (PACERONE) 200 MG tablet Take 1 tablet by mouth Daily. 90 tablet 1   • Cyanocobalamin (VITAMIN B-12) 1000 MCG sublingual tablet Place 1,000 mcg under the tongue Daily. One SL daily 90 each 3   • D3 SUPER STRENGTH 2000 units capsule Take  by mouth Daily.  3   • DULoxetine (CYMBALTA) 60 MG capsule Take 1 capsule by mouth Daily. For mood and fibro pain 30 capsule 11   • pravastatin (PRAVACHOL) 80 MG tablet Take 1 tablet by mouth Daily. For cholesterol 30 tablet 11   • traMADol (ULTRAM) 50 MG tablet Take 1 tablet by mouth Every 6 (Six) Hours As Needed for Moderate Pain . May refill on or after 3-6-18 120 tablet 2   • amoxicillin-clavulanate (AUGMENTIN) 875-125 MG per tablet TAKE ONE TABLET BY MOUTH EVERY TWELVE HOURS WITH FOOD FOR INFECTION 20 tablet 0   • Cholecalciferol (D3 SUPER STRENGTH) 2000 units capsule Take 2,000 Units by mouth Daily for 90 days. 90 each 3     No facility-administered medications prior to visit.        Patient Active Problem List   Diagnosis   • Anxiety   • Depression   • Fibromyalgia   • Hyperlipidemia   • Essential hypertension   • IFG (impaired fasting glucose)   • Renal insufficiency   • Shoulder bursitis   • Seasonal allergic rhinitis   • PAF (paroxysmal atrial fibrillation) (CMS/[facility])   • CKD (chronic kidney disease) stage 3, GFR 30-59 ml/min   • Vitamin D deficiency       Advance Care Planning:  has NO advance directive - not interested in additional information    Identification of Risk Factors:  Risk factors include: chronic pain.    Review of Systems    Compared to one year ago, the patient feels her physical health is the  "same.  Compared to one year ago, the patient feels her mental health is the same.    Objective     Physical Exam    Vitals:    08/24/18 0840   BP: 150/82   BP Location: Left arm   Patient Position: Sitting   Cuff Size: Large Adult   Pulse: 69   Resp: 16   Temp: 97.7 °F (36.5 °C)   TempSrc: Oral   SpO2: 95%   Weight: 83.5 kg (184 lb)   Height: 162.6 cm (64\")   PainSc:   4   PainLoc: Back       Patient's Body mass index is 31.58 kg/m². BMI is above normal parameters. Recommendations include: exercise counseling.      Assessment/Plan   Patient Self-Management and Personalized Health Advice  The patient has been provided with information about: weight management and designing advance directives and preventive services including:   · Shingrix.    Visit Diagnoses:  No diagnosis found.    No orders of the defined types were placed in this encounter.      Outpatient Encounter Prescriptions as of 8/24/2018   Medication Sig Dispense Refill   • amiodarone (PACERONE) 200 MG tablet Take 1 tablet by mouth Daily. 90 tablet 1   • Cyanocobalamin (VITAMIN B-12) 1000 MCG sublingual tablet Place 1,000 mcg under the tongue Daily. One SL daily 90 each 3   • D3 SUPER STRENGTH 2000 units capsule Take  by mouth Daily.  3   • DULoxetine (CYMBALTA) 60 MG capsule Take 1 capsule by mouth Daily. For mood and fibro pain 30 capsule 11   • pravastatin (PRAVACHOL) 80 MG tablet Take 1 tablet by mouth Daily. For cholesterol 30 tablet 11   • traMADol (ULTRAM) 50 MG tablet Take 1 tablet by mouth Every 6 (Six) Hours As Needed for Moderate Pain . May refill on or after 3-6-18 120 tablet 2   • [DISCONTINUED] amoxicillin-clavulanate (AUGMENTIN) 875-125 MG per tablet TAKE ONE TABLET BY MOUTH EVERY TWELVE HOURS WITH FOOD FOR INFECTION 20 tablet 0   • [DISCONTINUED] Cholecalciferol (D3 SUPER STRENGTH) 2000 units capsule Take 2,000 Units by mouth Daily for 90 days. 90 each 3     No facility-administered encounter medications on file as of 8/24/2018.  "       Reviewed use of high risk medication in the elderly: yes  Reviewed for potential of harmful drug interactions in the elderly: yes    Follow Up:  No Follow-up on file.     An After Visit Summary and PPPS with all of these plans were given to the patient.

## 2018-08-24 NOTE — PROGRESS NOTES
Subjective   Miguelina Mueller is a 73 y.o. female.     History of Present Illness   Miguelina Mueller 73 y.o. female who presents today for routine follow up check and medication refills.  she has a history of   Patient Active Problem List   Diagnosis   • Anxiety   • Depression   • Fibromyalgia   • Hyperlipidemia   • Essential hypertension   • IFG (impaired fasting glucose)   • Renal insufficiency   • Shoulder bursitis   • Seasonal allergic rhinitis   • PAF (paroxysmal atrial fibrillation) (CMS/[facility])   • CKD (chronic kidney disease) stage 3, GFR 30-59 ml/min   • Vitamin D deficiency   • B12 deficiency   .  Since the last visit, she has overall felt tired.  She has Hyperlipidemia and is well controlled on medication, Atrial Fibrillation and remains under the care of their cardiologist for medical management and Vitamin D deficiency and well controlled on medication and labs at goal >30.  she has been compliant with current medications have reviewed them.  The patient denies medication side effects.    Results for orders placed or performed in visit on 08/01/18   Comprehensive metabolic panel   Result Value Ref Range    Glucose 108 (H) 65 - 99 mg/dL    BUN 16 8 - 23 mg/dL    Creatinine 1.15 (H) 0.57 - 1.00 mg/dL    eGFR Non African Am 47 (L) >60 mL/min/1.73    eGFR African Am 56 (L) >60 mL/min/1.73    BUN/Creatinine Ratio 13.9 7.0 - 25.0    Sodium 145 136 - 145 mmol/L    Potassium 4.5 3.5 - 5.2 mmol/L    Chloride 107 98 - 107 mmol/L    Total CO2 25.5 22.0 - 29.0 mmol/L    Calcium 9.3 8.6 - 10.5 mg/dL    Total Protein 6.2 6.0 - 8.5 g/dL    Albumin 3.70 3.50 - 5.20 g/dL    Globulin 2.5 gm/dL    A/G Ratio 1.5 g/dL    Total Bilirubin 0.4 0.1 - 1.2 mg/dL    Alkaline Phosphatase 120 (H) 39 - 117 U/L    AST (SGOT) 15 1 - 32 U/L    ALT (SGPT) 16 1 - 33 U/L   Lipid panel   Result Value Ref Range    Total Cholesterol 181 0 - 200 mg/dL    Triglycerides 89 0 - 150 mg/dL    HDL Cholesterol 61 (H) 40 - 60 mg/dL    VLDL Cholesterol 17.8 5 -  40 mg/dL    LDL Cholesterol  102 (H) 0 - 100 mg/dL   Vitamin D 25 Hydroxy   Result Value Ref Range    25 Hydroxy, Vitamin D 50.1 30.0 - 100.0 ng/ml   Vitamin B12   Result Value Ref Range    Vitamin B-12 251 211 - 946 pg/mL   Folate   Result Value Ref Range    Folate 14.90 4.78 - 24.20 ng/mL   Hemoglobin A1c   Result Value Ref Range    Hemoglobin A1C 5.60 4.80 - 5.60 %   CBC & Differential   Result Value Ref Range    WBC 8.30 4.50 - 10.70 10*3/mm3    RBC 4.83 3.90 - 5.20 10*6/mm3    Hemoglobin 14.9 11.9 - 15.5 g/dL    Hematocrit 45.8 (H) 35.6 - 45.5 %    MCV 94.8 80.5 - 98.2 fL    MCH 30.8 26.9 - 32.0 pg    MCHC 32.5 32.4 - 36.3 g/dL    RDW 13.8 (H) 11.7 - 13.0 %    Platelets 200 140 - 500 10*3/mm3    Neutrophil Rel % 64.8 42.7 - 76.0 %    Lymphocyte Rel % 24.2 19.6 - 45.3 %    Monocyte Rel % 6.5 5.0 - 12.0 %    Eosinophil Rel % 3.7 0.3 - 6.2 %    Basophil Rel % 0.8 0.0 - 1.5 %    Neutrophils Absolute 5.37 1.90 - 8.10 10*3/mm3    Lymphocytes Absolute 2.01 0.90 - 4.80 10*3/mm3    Monocytes Absolute 0.54 0.20 - 1.20 10*3/mm3    Eosinophils Absolute 0.31 0.00 - 0.70 10*3/mm3    Basophils Absolute 0.07 0.00 - 0.20 10*3/mm3    Immature Granulocyte Rel % 0.2 0.0 - 0.5 %    Immature Grans Absolute 0.02 0.00 - 0.03 10*3/mm3   thyroid labs 5-8-18 per Dr Dover    I did start B12 and will get f/u labs  Declines DEXA    She cannot take Crestor and tried it; this is her best med  Ultram helps pain some and has chronic fibro muscle pain and has joint pain.  The patient has read and signed the [facility] Controlled Substance Contract.  I will continue to see patient for regular follow up appointments.  They are well controlled on their medication.  NATA has been reviewed by me and is updated every 3 months. The patient is aware of the potential for addiction and dependence.     Miguelina Mueller female 73 y.o. who presents today for follow up of Depression and Anxiety.  She reports medication does help and this is her best med  and tried several. Onset of symptoms was approximately several years ago.  She denies current suicidal and homicidal ideation. Risk factors are chronic illness and chronic pain.  Previous treatment includes current Rx.  She complains of the following medication side effects: none.  The patient declines to go to counseling..     The following portions of the patient's history were reviewed and updated as appropriate: allergies, current medications, past family history, past medical history, past social history, past surgical history and problem list.    Review of Systems   Constitutional: Positive for fatigue. Negative for activity change, appetite change and unexpected weight change.   HENT: Negative for nosebleeds and trouble swallowing.    Eyes: Negative for pain and visual disturbance.   Respiratory: Negative for chest tightness, shortness of breath and wheezing.    Cardiovascular: Negative for chest pain and palpitations.   Gastrointestinal: Negative for abdominal pain and blood in stool.   Endocrine: Negative.    Genitourinary: Negative for difficulty urinating and hematuria.   Musculoskeletal: Positive for arthralgias, back pain, myalgias and neck pain. Negative for joint swelling.   Skin: Negative for color change and rash.   Allergic/Immunologic: Negative.    Neurological: Negative for syncope and speech difficulty.   Hematological: Negative for adenopathy.   Psychiatric/Behavioral: Positive for dysphoric mood and sleep disturbance. Negative for agitation and confusion.   All other systems reviewed and are negative.      Objective   Physical Exam   Constitutional: She is oriented to person, place, and time. She appears well-developed and well-nourished. No distress.   HENT:   Head: Normocephalic and atraumatic.   Eyes: Pupils are equal, round, and reactive to light. Conjunctivae and EOM are normal. Right eye exhibits no discharge. Left eye exhibits no discharge. No scleral icterus.   Neck: Normal range of  motion. Neck supple. No tracheal deviation present. No thyromegaly present.   Cardiovascular: Normal rate, regular rhythm, normal heart sounds, intact distal pulses and normal pulses.  Exam reveals no gallop.    No murmur heard.  Pulmonary/Chest: Effort normal and breath sounds normal. No respiratory distress. She has no wheezes. She has no rales.   Musculoskeletal: Normal range of motion. She exhibits tenderness.   Neurological: She is alert and oriented to person, place, and time. She exhibits normal muscle tone. Coordination normal.   Skin: Skin is warm. No rash noted. No erythema. No pallor.   Left great toenail irreg and thick; toenail fungus and wait to Rx   Psychiatric: Her behavior is normal. Judgment and thought content normal.   Nursing note and vitals reviewed.      Assessment/Plan   Problems Addressed this Visit        Cardiovascular and Mediastinum    Hyperlipidemia    Relevant Medications    pravastatin (PRAVACHOL) 80 MG tablet       Digestive    Vitamin D deficiency    B12 deficiency       Endocrine    IFG (impaired fasting glucose)       Genitourinary    CKD (chronic kidney disease) stage 3, GFR 30-59 ml/min       Other    Depression    Relevant Medications    DULoxetine (CYMBALTA) 60 MG capsule    Fibromyalgia      Other Visit Diagnoses     Medicare annual wellness visit, subsequent    -  Primary

## 2018-10-20 DIAGNOSIS — M79.7 FIBROMYALGIA: ICD-10-CM

## 2018-10-22 RX ORDER — TRAMADOL HYDROCHLORIDE 50 MG/1
TABLET ORAL
Qty: 120 TABLET | Refills: 0 | Status: SHIPPED | OUTPATIENT
Start: 2018-10-22 | End: 2018-11-16 | Stop reason: SDUPTHER

## 2018-11-09 LAB
BASOPHILS # BLD AUTO: 0.08 10*3/MM3 (ref 0–0.2)
BASOPHILS NFR BLD AUTO: 0.7 % (ref 0–1.5)
EOSINOPHIL # BLD AUTO: 0.35 10*3/MM3 (ref 0–0.7)
EOSINOPHIL NFR BLD AUTO: 3.1 % (ref 0.3–6.2)
ERYTHROCYTE [DISTWIDTH] IN BLOOD BY AUTOMATED COUNT: 13.8 % (ref 11.7–13)
FOLATE SERPL-MCNC: 12.21 NG/ML (ref 4.78–24.2)
HCT VFR BLD AUTO: 48.1 % (ref 35.6–45.5)
HGB BLD-MCNC: 15.5 G/DL (ref 11.9–15.5)
IMM GRANULOCYTES # BLD: 0.03 10*3/MM3 (ref 0–0.03)
IMM GRANULOCYTES NFR BLD: 0.3 % (ref 0–0.5)
LYMPHOCYTES # BLD AUTO: 3.86 10*3/MM3 (ref 0.9–4.8)
LYMPHOCYTES NFR BLD AUTO: 33.8 % (ref 19.6–45.3)
MCH RBC QN AUTO: 30.3 PG (ref 26.9–32)
MCHC RBC AUTO-ENTMCNC: 32.2 G/DL (ref 32.4–36.3)
MCV RBC AUTO: 94.1 FL (ref 80.5–98.2)
MONOCYTES # BLD AUTO: 0.77 10*3/MM3 (ref 0.2–1.2)
MONOCYTES NFR BLD AUTO: 6.7 % (ref 5–12)
NEUTROPHILS # BLD AUTO: 6.37 10*3/MM3 (ref 1.9–8.1)
NEUTROPHILS NFR BLD AUTO: 55.7 % (ref 42.7–76)
PLATELET # BLD AUTO: 207 10*3/MM3 (ref 140–500)
RBC # BLD AUTO: 5.11 10*6/MM3 (ref 3.9–5.2)
VIT B12 SERPL-MCNC: 917 PG/ML (ref 211–946)
WBC # BLD AUTO: 11.43 10*3/MM3 (ref 4.5–10.7)

## 2018-11-16 ENCOUNTER — OFFICE VISIT (OUTPATIENT)
Dept: FAMILY MEDICINE CLINIC | Facility: CLINIC | Age: 73
End: 2018-11-16

## 2018-11-16 VITALS
HEART RATE: 83 BPM | DIASTOLIC BLOOD PRESSURE: 78 MMHG | SYSTOLIC BLOOD PRESSURE: 130 MMHG | OXYGEN SATURATION: 97 % | HEIGHT: 64 IN | TEMPERATURE: 97.5 F | BODY MASS INDEX: 31.41 KG/M2 | RESPIRATION RATE: 16 BRPM | WEIGHT: 184 LBS

## 2018-11-16 DIAGNOSIS — R79.89 ABNORMAL CBC: ICD-10-CM

## 2018-11-16 DIAGNOSIS — F33.0 MILD EPISODE OF RECURRENT MAJOR DEPRESSIVE DISORDER (HCC): ICD-10-CM

## 2018-11-16 DIAGNOSIS — M79.7 FIBROMYALGIA: ICD-10-CM

## 2018-11-16 DIAGNOSIS — F41.9 ANXIETY: Primary | ICD-10-CM

## 2018-11-16 DIAGNOSIS — N28.9 RENAL INSUFFICIENCY: ICD-10-CM

## 2018-11-16 PROCEDURE — 99214 OFFICE O/P EST MOD 30 MIN: CPT | Performed by: PHYSICIAN ASSISTANT

## 2018-11-16 PROCEDURE — G0008 ADMIN INFLUENZA VIRUS VAC: HCPCS | Performed by: PHYSICIAN ASSISTANT

## 2018-11-16 PROCEDURE — 90674 CCIIV4 VAC NO PRSV 0.5 ML IM: CPT | Performed by: PHYSICIAN ASSISTANT

## 2018-11-16 NOTE — PATIENT INSTRUCTIONS
Low glycemic index diet  Exercise 30 minutes most days of the week  Make sure you get results on any labs or tests we ordered today  We discussed medications and how to take them as prescribed  Sleep 6-8 hours each night if possible  If you have not signed up for [employer], please activate your code ASAP from your After Visit Summary today    LDL goal <100  LDL goal if heart disease <70  HDL goal >60  Triglyceride goal <150  BP goal =<130/80  Fasting glucose <100    Warned patient that this medication can cause drowsiness and impair them operating machinery, including driving a car.  Caution is advised.

## 2018-11-16 NOTE — PROGRESS NOTES
Subjective   Miguelina Mueller is a 73 y.o. female.     History of Present Illness     Miguelina Mueller 73 y.o. female who presents today for routine follow up check and medication refills.  she has a history of   Patient Active Problem List   Diagnosis   • Anxiety   • Mild episode of recurrent major depressive disorder (CMS/[facility])   • Fibromyalgia   • Hyperlipidemia   • IFG (impaired fasting glucose)   • Renal insufficiency   • Shoulder bursitis   • Seasonal allergic rhinitis   • PAF (paroxysmal atrial fibrillation) (CMS/[facility])   • CKD (chronic kidney disease) stage 3, GFR 30-59 ml/min (CMS/[facility])   • Vitamin D deficiency   • B12 deficiency   .  Since the last visit, she has overall felt tired.  She has Hyperlipidemia and here to discuss treatment, Atrial Fibrillation and remains under the care of their cardiologist for medical management and Vitamin D deficiency and well controlled on medication and labs at goal >30.  she has been compliant with current medications have reviewed them.  The patient denies medication side effects.    Wants to wait on mammo and DEXA  Results for orders placed or performed in visit on 08/24/18   Vitamin B12   Result Value Ref Range    Vitamin B-12 917 211 - 946 pg/mL   Folate   Result Value Ref Range    Folate 12.21 4.78 - 24.20 ng/mL   CBC & Differential   Result Value Ref Range    WBC 11.43 (H) 4.50 - 10.70 10*3/mm3    RBC 5.11 3.90 - 5.20 10*6/mm3    Hemoglobin 15.5 11.9 - 15.5 g/dL    Hematocrit 48.1 (H) 35.6 - 45.5 %    MCV 94.1 80.5 - 98.2 fL    MCH 30.3 26.9 - 32.0 pg    MCHC 32.2 (L) 32.4 - 36.3 g/dL    RDW 13.8 (H) 11.7 - 13.0 %    Platelets 207 140 - 500 10*3/mm3    Neutrophil Rel % 55.7 42.7 - 76.0 %    Lymphocyte Rel % 33.8 19.6 - 45.3 %    Monocyte Rel % 6.7 5.0 - 12.0 %    Eosinophil Rel % 3.1 0.3 - 6.2 %    Basophil Rel % 0.7 0.0 - 1.5 %    Neutrophils Absolute 6.37 1.90 - 8.10 10*3/mm3    Lymphocytes Absolute 3.86 0.90 - 4.80 10*3/mm3    Monocytes Absolute 0.77 0.20 - 1.20 10*3/mm3     Eosinophils Absolute 0.35 0.00 - 0.70 10*3/mm3    Basophils Absolute 0.08 0.00 - 0.20 10*3/mm3    Immature Granulocyte Rel % 0.3 0.0 - 0.5 %    Immature Grans Absolute 0.03 0.00 - 0.03 10*3/mm3   I did note labs from August and did f/u above and B12 looks great now  Watching renal functions  Notes recorded by Yuki Pennington PA-C on 8/19/2018 at 8:59 AM EDT  She is intolerant to other statins and LDL down to almost goal at 102.   CKD stable. Alk phos same and watching; glucose imparled but A1C in range.  Add OTC B12 1,000 mcg and will send as Rx to boost this.  Will make new lab order next appt    I will repeat nonfasting CBC next week d/t WBC elevated     She cannot take Crestor and tried it; this is her best med  Ultram helps pain some and has chronic fibro muscle pain and has joint pain.  The patient has read and signed the [facility] Controlled Substance Contract.  I will continue to see patient for regular follow up appointments.  They are well controlled on their medication.  NATA has been reviewed by me and is updated every 3 months. The patient is aware of the potential for addiction and dependence  Miguelina Mueller female 73 y.o. who presents today for follow up of Depression and Anxiety.  She reports medication does help and this is her best med and tried several. Onset of symptoms was approximately several years ago.  She denies current suicidal and homicidal ideation. Risk factors are chronic illness and chronic pain.  Previous treatment includes current Rx.  She complains of the following medication side effects: none.  The patient declines to go to counseling..  The following portions of the patient's history were reviewed and updated as appropriate: allergies, current medications, past family history, past medical history, past social history, past surgical history and problem list.    Review of Systems   Constitutional: Positive for fatigue. Negative for activity change, appetite change and  unexpected weight change.   HENT: Negative for nosebleeds and trouble swallowing.    Eyes: Negative for pain and visual disturbance.   Respiratory: Negative for chest tightness, shortness of breath and wheezing.    Cardiovascular: Negative for chest pain and palpitations.   Gastrointestinal: Negative for abdominal pain and blood in stool.   Endocrine: Negative.    Genitourinary: Negative for difficulty urinating and hematuria.   Musculoskeletal: Positive for arthralgias, back pain, myalgias and neck pain. Negative for joint swelling.   Skin: Negative for color change and rash.   Allergic/Immunologic: Negative.    Neurological: Negative for syncope and speech difficulty.   Hematological: Negative for adenopathy.   Psychiatric/Behavioral: Positive for dysphoric mood and sleep disturbance. Negative for agitation and confusion.   All other systems reviewed and are negative.      Objective   Physical Exam   Constitutional: She is oriented to person, place, and time. She appears well-developed and well-nourished. No distress.   HENT:   Head: Normocephalic and atraumatic.   Eyes: Conjunctivae and EOM are normal. Pupils are equal, round, and reactive to light. Right eye exhibits no discharge. Left eye exhibits no discharge. No scleral icterus.   Neck: Normal range of motion. Neck supple. No tracheal deviation present. No thyromegaly present.   Cardiovascular: Normal rate, regular rhythm, normal heart sounds, intact distal pulses and normal pulses. Exam reveals no gallop.   No murmur heard.  Pulmonary/Chest: Effort normal and breath sounds normal. No respiratory distress. She has no wheezes. She has no rales.   Musculoskeletal: Normal range of motion.   Neurological: She is alert and oriented to person, place, and time. She exhibits normal muscle tone. Coordination normal.   Skin: Skin is warm. No rash noted. No erythema. No pallor.   Psychiatric: She has a normal mood and affect. Her behavior is normal. Judgment and thought  content normal.   Nursing note and vitals reviewed.      Assessment/Plan   Miguelina was seen today for med management and hypertension.    Diagnoses and all orders for this visit:    Anxiety    Fibromyalgia  -     Basic Metabolic Panel  -     CBC & Differential    Renal insufficiency  -     Basic Metabolic Panel  -     CBC & Differential    Mild episode of recurrent major depressive disorder (CMS/HCC)  -     Basic Metabolic Panel  -     CBC & Differential    Abnormal CBC  -     Basic Metabolic Panel  -     CBC & Differential    Other orders  -     Flucelvax Quad=>4Years (PFS)

## 2018-11-17 RX ORDER — TRAMADOL HYDROCHLORIDE 50 MG/1
50 TABLET ORAL EVERY 6 HOURS PRN
Qty: 120 TABLET | Refills: 2 | Status: SHIPPED | OUTPATIENT
Start: 2018-11-17 | End: 2019-01-15 | Stop reason: SDUPTHER

## 2018-12-02 LAB
BASOPHILS # BLD AUTO: 0.1 X10E3/UL (ref 0–0.2)
BASOPHILS NFR BLD AUTO: 1 %
BUN SERPL-MCNC: 13 MG/DL (ref 8–27)
BUN/CREAT SERPL: 11 (ref 12–28)
CALCIUM SERPL-MCNC: 9.9 MG/DL (ref 8.7–10.3)
CHLORIDE SERPL-SCNC: 104 MMOL/L (ref 96–106)
CO2 SERPL-SCNC: 23 MMOL/L (ref 20–29)
CREAT SERPL-MCNC: 1.2 MG/DL (ref 0.57–1)
EOSINOPHIL # BLD AUTO: 0.4 X10E3/UL (ref 0–0.4)
EOSINOPHIL NFR BLD AUTO: 3 %
ERYTHROCYTE [DISTWIDTH] IN BLOOD BY AUTOMATED COUNT: 14.3 % (ref 12.3–15.4)
GLUCOSE SERPL-MCNC: 110 MG/DL (ref 65–99)
HCT VFR BLD AUTO: 47.1 % (ref 34–46.6)
HGB BLD-MCNC: 16 G/DL (ref 11.1–15.9)
IMM GRANULOCYTES # BLD: 0 X10E3/UL (ref 0–0.1)
IMM GRANULOCYTES NFR BLD: 0 %
LYMPHOCYTES # BLD AUTO: 3.4 X10E3/UL (ref 0.7–3.1)
LYMPHOCYTES NFR BLD AUTO: 33 %
MCH RBC QN AUTO: 31.3 PG (ref 26.6–33)
MCHC RBC AUTO-ENTMCNC: 34 G/DL (ref 31.5–35.7)
MCV RBC AUTO: 92 FL (ref 79–97)
MONOCYTES # BLD AUTO: 0.7 X10E3/UL (ref 0.1–0.9)
MONOCYTES NFR BLD AUTO: 7 %
NEUTROPHILS # BLD AUTO: 5.8 X10E3/UL (ref 1.4–7)
NEUTROPHILS NFR BLD AUTO: 56 %
PLATELET # BLD AUTO: 231 X10E3/UL (ref 150–379)
POTASSIUM SERPL-SCNC: 4.4 MMOL/L (ref 3.5–5.2)
RBC # BLD AUTO: 5.12 X10E6/UL (ref 3.77–5.28)
SODIUM SERPL-SCNC: 145 MMOL/L (ref 134–144)
WBC # BLD AUTO: 10.3 X10E3/UL (ref 3.4–10.8)

## 2018-12-04 LAB
IRON SATN MFR SERPL: 22 % (ref 15–55)
IRON SERPL-MCNC: 70 UG/DL (ref 27–139)
Lab: NORMAL
TIBC SERPL-MCNC: 312 UG/DL (ref 250–450)
UIBC SERPL-MCNC: 242 UG/DL (ref 118–369)
WRITTEN AUTHORIZATION: NORMAL

## 2018-12-27 RX ORDER — AMIODARONE HYDROCHLORIDE 200 MG/1
200 TABLET ORAL DAILY
Qty: 90 TABLET | Refills: 0 | Status: SHIPPED | OUTPATIENT
Start: 2018-12-27 | End: 2019-04-04 | Stop reason: SDUPTHER

## 2019-01-15 DIAGNOSIS — M79.7 FIBROMYALGIA: ICD-10-CM

## 2019-01-15 RX ORDER — TRAMADOL HYDROCHLORIDE 50 MG/1
TABLET ORAL
Qty: 120 TABLET | Refills: 0 | Status: SHIPPED | OUTPATIENT
Start: 2019-01-15 | End: 2019-02-13 | Stop reason: SDUPTHER

## 2019-02-11 ENCOUNTER — HOSPITAL ENCOUNTER (EMERGENCY)
Facility: HOSPITAL | Age: 74
Discharge: HOME | End: 2019-02-12
Attending: EMERGENCY MEDICINE | Admitting: EMERGENCY MEDICINE
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
## 2019-02-11 DIAGNOSIS — S09.90XA INJURY OF HEAD, INITIAL ENCOUNTER: ICD-10-CM

## 2019-02-11 PROCEDURE — 99283 EMERGENCY DEPT VISIT LOW MDM: CPT

## 2019-02-12 ENCOUNTER — APPOINTMENT (OUTPATIENT)
Dept: CT IMAGING | Facility: HOSPITAL | Age: 74
End: 2019-02-12

## 2019-02-12 VITALS
WEIGHT: 181.6 LBS | HEIGHT: 64 IN | TEMPERATURE: 98.9 F | RESPIRATION RATE: 16 BRPM | HEART RATE: 77 BPM | DIASTOLIC BLOOD PRESSURE: 81 MMHG | BODY MASS INDEX: 31 KG/M2 | SYSTOLIC BLOOD PRESSURE: 160 MMHG | OXYGEN SATURATION: 97 %

## 2019-02-12 PROCEDURE — 70450 CT HEAD/BRAIN W/O DYE: CPT

## 2019-02-12 NOTE — ED PROVIDER NOTES
EMERGENCY DEPARTMENT ENCOUNTER    CHIEF COMPLAINT  Chief Complaint: head injury  History given by: patient  History limited by: nothing  Room Number: 28/28  PMD: Yuki Pennington PA-C      HPI:  Pt is a 73 y.o. female who presents complaining of swelling to the occipital scalp that began around 2230 yesterday night s/p a mechanical fall in mud. Pt reports mild neck stiffness but denies LOC, HA, dizziness, neck pain, nausea, numbness/tingling, or any other sustaining injuries. Pt reports her ER visit today is mostly for precautions. Pt denies use of anticoagulants. There are no other complaints at this time.     Duration:  About an hour ago  Onset: sudden  Timing: constant  Location: head  Radiation: none  Quality: swelling  Intensity/Severity: mild  Progression: improved  Associated Symptoms: mild neck stiffness  Aggravating Factors: none  Alleviating Factors: none  Previous Episodes: none  Treatment before arrival: none    PAST MEDICAL HISTORY  Active Ambulatory Problems     Diagnosis Date Noted   • Anxiety    • Mild episode of recurrent major depressive disorder (CMS/HCC)    • Fibromyalgia    • Hyperlipidemia    • IFG (impaired fasting glucose) 05/21/2014   • Renal insufficiency 06/04/2014   • Shoulder bursitis 05/19/2014   • Seasonal allergic rhinitis 05/14/2015   • PAF (paroxysmal atrial fibrillation) (CMS/HCC) 08/17/2017   • CKD (chronic kidney disease) stage 3, GFR 30-59 ml/min (CMS/HCC) 05/29/2018   • Vitamin D deficiency 05/29/2018   • B12 deficiency 08/24/2018     Resolved Ambulatory Problems     Diagnosis Date Noted   • Essential hypertension      Past Medical History:   Diagnosis Date   • Anxiety    • Atrial fibrillation (CMS/HCC)    • Attention deficit disorder    • Benign essential hypertension    • Depression    • Diastolic dysfunction    • Dizziness    • Encounter for eye exam    • Fibrocystic breast disease    • Fibromyalgia    • History of bone density study    • History of CT scan of abdomen  01/05/2011   • History of echocardiogram 09/29/2015   • History of exercise stress test 09/29/2015   • History of pelvic ultrasound 12/11/2009   • Hyperlipidemia    • Hypertension    • Hypokalemia    • IFG (impaired fasting glucose) 05/21/2014   • Malaise and fatigue    • PAF (paroxysmal atrial fibrillation) (CMS/HCC)    • Postmenopausal    • Pulmonary nodule 10/2010   • Pyelonephritis 10/2010   • Renal insufficiency 06/04/2014   • Seasonal allergic rhinitis 5/14/2015   • Sepsis (CMS/HCC) 10/2010   • Shoulder bursitis 05/19/2014   • Thrombocytopenia (CMS/HCC) 10/2010   • Weakness        PAST SURGICAL HISTORY  Past Surgical History:   Procedure Laterality Date   • COLONOSCOPY     • ROTATOR CUFF REPAIR Right 07/30/2014       FAMILY HISTORY  Family History   Problem Relation Age of Onset   • Heart disease Mother    • Thyroid disease Mother    • Emphysema Father    • Cancer Brother    • Heart disease Brother        SOCIAL HISTORY  Social History     Socioeconomic History   • Marital status:      Spouse name: Not on file   • Number of children: Not on file   • Years of education: Not on file   • Highest education level: Not on file   Social Needs   • Financial resource strain: Not on file   • Food insecurity - worry: Not on file   • Food insecurity - inability: Not on file   • Transportation needs - medical: Not on file   • Transportation needs - non-medical: Not on file   Occupational History   • Not on file   Tobacco Use   • Smoking status: Former Smoker     Packs/day: 0.25   • Smokeless tobacco: Never Used   • Tobacco comment: quit around 2007   Substance and Sexual Activity   • Alcohol use: Yes     Comment: rare    ///     caffeine use   • Drug use: No   • Sexual activity: Defer   Other Topics Concern   • Not on file   Social History Narrative   • Not on file       ALLERGIES  Patient has no known allergies.    REVIEW OF SYSTEMS  Review of Systems   Constitutional: Negative for fever.   HENT: Positive for  facial swelling (occipital head). Negative for sore throat.    Eyes: Negative.    Respiratory: Negative for cough and shortness of breath.    Cardiovascular: Negative for chest pain.   Gastrointestinal: Negative for abdominal pain, diarrhea and vomiting.   Genitourinary: Negative for dysuria.   Musculoskeletal: Positive for neck stiffness (mild). Negative for neck pain.   Skin: Negative for rash.   Neurological: Negative for dizziness, syncope, weakness, numbness and headaches.   Hematological: Negative.    Psychiatric/Behavioral: Negative.    All other systems reviewed and are negative.      PHYSICAL EXAM  ED Triage Vitals   Temp Heart Rate Resp BP SpO2   02/11/19 2345 02/11/19 2345 02/11/19 2345 02/12/19 0012 02/11/19 2345   99.2 °F (37.3 °C) 100 18 148/92 94 %      Temp src Heart Rate Source Patient Position BP Location FiO2 (%)   02/11/19 2345 02/12/19 0132 02/12/19 0012 02/12/19 0012 --   Tympanic Monitor Sitting Right arm        Physical Exam   Constitutional: She is oriented to person, place, and time. No distress.   HENT:   Head: Normocephalic.   There is mild swelling to the occipital scalp. There is no tenderness to the head.   Eyes: EOM are normal. Pupils are equal, round, and reactive to light.   Neck: Normal range of motion. Neck supple.   Cardiovascular: Normal rate, regular rhythm and normal heart sounds.   Pulmonary/Chest: Effort normal and breath sounds normal. No respiratory distress.   Abdominal: Soft. There is no tenderness. There is no rebound and no guarding.   Musculoskeletal: Normal range of motion. She exhibits no edema, tenderness or deformity.        Cervical back: Normal.   Neurological: She is alert and oriented to person, place, and time. She has normal sensation, normal strength and intact cranial nerves.   Skin: Skin is warm and dry. No rash noted.   Extremities are unremarkable.   Psychiatric: Mood and affect normal.   Nursing note and vitals reviewed.          RADIOLOGY  CT Head  Without Contrast   Final Result   No acute intracranial process identified.       Radiation dose reduction techniques were utilized, including automated   exposure control and exposure modulation based on body size.       This report was finalized on 2/12/2019 1:22 AM by Dr. Chiquita Carrasco M.D.               I ordered the above noted radiological studies. Interpreted by radiologist. Reviewed by me in PACS.       PROCEDURES  Procedures      PROGRESS AND CONSULTS  0019 Ordered CT Head w/o contrast for further evaluation.     0045 Rechecked pt and discussed imaging results that show nothing acute. Discussed the plan for discharge with instructions to f/u with her PCP for further evaluation and management. Strict RTER warnings given. Pt understands and agrees with the plan, all questions answered.    0051 Discussed pt's case with Dr. Suh, ER physician, who agrees with the plan to discharge pt home.       MEDICAL DECISION MAKING  Results were reviewed/discussed with the patient and they were also made aware of online access. Pt also made aware that some labs, such as cultures, will not be resulted during ER visit and follow up with PMD is necessary.     MDM  Number of Diagnoses or Management Options  Fall, initial encounter:   Injury of head, initial encounter:      Amount and/or Complexity of Data Reviewed  Tests in the radiology section of CPT®: ordered and reviewed (CT Head Without Contrast  No acute intracranial process identified.)  Decide to obtain previous medical records or to obtain history from someone other than the patient: yes (Epic)  Review and summarize past medical records: yes (The patient has no pertinent recent records in [facility].)  Independent visualization of images, tracings, or specimens: yes    Patient Progress  Patient progress: stable         DIAGNOSIS  Final diagnoses:   Fall, initial encounter   Injury of head, initial encounter       DISPOSITION  DISCHARGE    Patient discharged in stable  condition.    Reviewed implications of results, diagnosis, meds, responsibility to follow up, warning signs and symptoms of possible worsening, potential complications and reasons to return to ER, including any new or worsening symptoms.    Patient/Family voiced understanding of above instructions.    Discussed plan for discharge, as there is no emergent indication for admission. Patient referred to primary care provider for BP management due to today's BP. Pt/family is agreeable and understands need for follow up and repeat testing.  Pt is aware that discharge does not mean that nothing is wrong but it indicates no emergency is present that requires admission and they must continue care with follow-up as given below or physician of their choice.     FOLLOW-UP  Yuki Pennington PA-C  [address] [facility].[address] [place] [address]  [phone]    Schedule an appointment as soon as possible for a visit   As needed         Medication List      No changes were made to your prescriptions during this visit.           Latest Documented Vital Signs:  As of 8:23 PM  BP- 160/81 HR- 77 Temp- 98.9 °F (37.2 °C) (Oral) O2 sat- 97%    --  Documentation assistance provided by chely Bryan and Dione Rice for Zain Blanco PA-C.  Information recorded by the scribe was done at my direction and has been verified and validated by me.     Jaye Bryan  02/12/19 0420       Dione Rice  02/12/19 0446       Zain Blanco III, PA  02/12/19 2023

## 2019-02-12 NOTE — ED TRIAGE NOTES
Pt fell tonight while walking to her mailbox and hit head. Denies LOC, denies HA, denies neuro symptoms. Not on blood thinners.

## 2019-02-12 NOTE — ED NOTES
Pt states fell (slipped on muddy surface) @ 2230, hit head on blacktop, no n/v, no dizziness, no changes in vision.  Pt only c/o is stiffness in shoulders.Bed in low position, call light within reach, side rails up X2. Pt is alert and oriented X4, PERRLA, respirations are even and unlabored, chest rise and fall is equal in expansion.  Pt does not appear to be in distress at this time     Elis García, RN  02/12/19 0018

## 2019-02-12 NOTE — ED PROVIDER NOTES
MD ATTESTATION NOTE    Pt presents to the ED complaining of hitting her head, due to a fall tonight. Pt denies LOC, and denies taking blood thinners. Pt also denies any current headache. Pt has stiffness of the neck.    On exam, the pt is resting comfortably, in no distress, and without focal neurologic deficit. Pt has no lacerations or extracranial trauma signs. No C-spine tenderness.    I agree with the plan to wait for CT head results, and continue evaluation.    The CORINA and I have discussed this patient's history, physical exam, and treatment plan.    I have reviewed the documentation and personally had a face to face interaction with the patient. I affirm the documentation and agree with the treatment and plan.  The attached note describes my personal findings.    Documentation assistance provided by kamila Maki for Dr. Suh.  Information recorded by the scribe was done at my direction and has been verified and validated by me.       Cheryle Maki  02/12/19 0104       Gonzalez Suh MD  02/14/19 [id number]

## 2019-02-13 ENCOUNTER — OFFICE VISIT (OUTPATIENT)
Dept: FAMILY MEDICINE CLINIC | Facility: CLINIC | Age: 74
End: 2019-02-13

## 2019-02-13 VITALS
RESPIRATION RATE: 18 BRPM | TEMPERATURE: 98.5 F | HEIGHT: 64 IN | OXYGEN SATURATION: 94 % | DIASTOLIC BLOOD PRESSURE: 70 MMHG | HEART RATE: 88 BPM | SYSTOLIC BLOOD PRESSURE: 120 MMHG | BODY MASS INDEX: 30.9 KG/M2 | WEIGHT: 181 LBS

## 2019-02-13 DIAGNOSIS — N18.30 CKD (CHRONIC KIDNEY DISEASE) STAGE 3, GFR 30-59 ML/MIN (HCC): ICD-10-CM

## 2019-02-13 DIAGNOSIS — M79.7 FIBROMYALGIA: ICD-10-CM

## 2019-02-13 DIAGNOSIS — E53.8 B12 DEFICIENCY: ICD-10-CM

## 2019-02-13 DIAGNOSIS — F41.9 ANXIETY: ICD-10-CM

## 2019-02-13 DIAGNOSIS — F33.0 MILD EPISODE OF RECURRENT MAJOR DEPRESSIVE DISORDER (HCC): ICD-10-CM

## 2019-02-13 DIAGNOSIS — R73.01 IFG (IMPAIRED FASTING GLUCOSE): Primary | ICD-10-CM

## 2019-02-13 DIAGNOSIS — E78.2 MIXED HYPERLIPIDEMIA: ICD-10-CM

## 2019-02-13 DIAGNOSIS — E55.9 VITAMIN D DEFICIENCY: ICD-10-CM

## 2019-02-13 PROCEDURE — 99214 OFFICE O/P EST MOD 30 MIN: CPT | Performed by: PHYSICIAN ASSISTANT

## 2019-02-13 RX ORDER — TRAMADOL HYDROCHLORIDE 50 MG/1
50 TABLET ORAL EVERY 6 HOURS PRN
Qty: 120 TABLET | Refills: 2 | Status: SHIPPED | OUTPATIENT
Start: 2019-02-13 | End: 2019-05-23 | Stop reason: SDUPTHER

## 2019-02-13 RX ORDER — DULOXETIN HYDROCHLORIDE 60 MG/1
60 CAPSULE, DELAYED RELEASE ORAL DAILY
Qty: 30 CAPSULE | Refills: 11 | Status: SHIPPED | OUTPATIENT
Start: 2019-02-13 | End: 2020-02-06 | Stop reason: SDUPTHER

## 2019-02-13 RX ORDER — MAGNESIUM 200 MG
1000 TABLET ORAL DAILY
Qty: 90 EACH | Refills: 3 | Status: SHIPPED | OUTPATIENT
Start: 2019-02-13 | End: 2020-05-06

## 2019-02-13 RX ORDER — PRAVASTATIN SODIUM 80 MG/1
80 TABLET ORAL DAILY
Qty: 30 TABLET | Refills: 11 | Status: SHIPPED | OUTPATIENT
Start: 2019-02-13 | End: 2020-02-06 | Stop reason: SDUPTHER

## 2019-02-13 NOTE — PROGRESS NOTES
Subjective   Miguelina Mueller is a 73 y.o. female.     History of Present Illness   Miguelina Mueller 73 y.o. female who presents today for routine follow up check and medication refills.  she has a history of   Patient Active Problem List   Diagnosis   • Anxiety   • Mild episode of recurrent major depressive disorder (CMS/HCC)   • Fibromyalgia   • Hyperlipidemia   • IFG (impaired fasting glucose)   • Renal insufficiency   • Shoulder bursitis   • Seasonal allergic rhinitis   • PAF (paroxysmal atrial fibrillation) (CMS/HCC)   • CKD (chronic kidney disease) stage 3, GFR 30-59 ml/min (CMS/HCC)   • Vitamin D deficiency   • B12 deficiency   .  Since the last visit, she has overall felt tired.  She has Hyperlipidemia and here to discuss treatment, Atrial Fibrillation and remains under the care of their cardiologist for medical management and Vitamin D deficiency and will update labs to confirm level is at goal >30.  she has been compliant with current medications have reviewed them.  The patient denies medication side effects.  She is intol to all statins but Pravastatin       Miguelina Mueller female 73 y.o. who presents today for follow up of Depression and Anxiety.  She reports medication does help and this is her best med and tried several. Onset of symptoms was approximately several years ago.  She denies current suicidal and homicidal ideation. Risk factors are chronic illness and chronic pain.  Previous treatment includes current Rx.  She complains of the following medication side effects: none.  The patient declines to go to counseling..     She remains on Ultram for Fibro pain and does help and tolerates it well.  The patient has read and signed the [facility] Controlled Substance Contract.  I will continue to see patient for regular follow up appointments.  They are well controlled on their medication.  NATA has been reviewed by me and is updated every 3 months. The patient is aware of the potential for addiction and  dependence  Wants to wait on mammo and DEXA  Results for orders placed or performed in visit on 11/16/18   Basic Metabolic Panel   Result Value Ref Range    Glucose 110 (H) 65 - 99 mg/dL    BUN 13 8 - 27 mg/dL    Creatinine 1.20 (H) 0.57 - 1.00 mg/dL    eGFR Non African Am 46 (L) >59 mL/min/1.73    eGFR African Am 53 (L) >59 mL/min/1.73    BUN/Creatinine Ratio 11 (L) 12 - 28    Sodium 145 (H) 134 - 144 mmol/L    Potassium 4.4 3.5 - 5.2 mmol/L    Chloride 104 96 - 106 mmol/L    Total CO2 23 20 - 29 mmol/L    Calcium 9.9 8.7 - 10.3 mg/dL   Iron Profile   Result Value Ref Range    TIBC 312 250 - 450 ug/dL    UIBC 242 118 - 369 ug/dL    Iron 70 27 - 139 ug/dL    Iron Saturation 22 15 - 55 %   Please Note   Result Value Ref Range    Please note Comment    Written Authorization   Result Value Ref Range    Written Authorization Comment    CBC & Differential   Result Value Ref Range    WBC 10.3 3.4 - 10.8 x10E3/uL    RBC 5.12 3.77 - 5.28 x10E6/uL    Hemoglobin 16.0 (H) 11.1 - 15.9 g/dL    Hematocrit 47.1 (H) 34.0 - 46.6 %    MCV 92 79 - 97 fL    MCH 31.3 26.6 - 33.0 pg    MCHC 34.0 31.5 - 35.7 g/dL    RDW 14.3 12.3 - 15.4 %    Platelets 231 150 - 379 x10E3/uL    Neutrophil Rel % 56 Not Estab. %    Lymphocyte Rel % 33 Not Estab. %    Monocyte Rel % 7 Not Estab. %    Eosinophil Rel % 3 Not Estab. %    Basophil Rel % 1 Not Estab. %    Neutrophils Absolute 5.8 1.4 - 7.0 x10E3/uL    Lymphocytes Absolute 3.4 (H) 0.7 - 3.1 x10E3/uL    Monocytes Absolute 0.7 0.1 - 0.9 x10E3/uL    Eosinophils Absolute 0.4 0.0 - 0.4 x10E3/uL    Basophils Absolute 0.1 0.0 - 0.2 x10E3/uL    Immature Granulocyte Rel % 0 Not Estab. %    Immature Grans Absolute 0.0 0.0 - 0.1 x10E3/uL     I am watching renal functions; avoiding NSAIDs    I saw ER notes 2-11-19 and CT neg  She has fell at home --slip on karuna; also weaker overall; suggest rolling walker and consider PT  Sees Dr Dover yearly April  Her B12 was 251 and started oral med and now at goal  917    Next labs to be July    The following portions of the patient's history were reviewed and updated as appropriate: allergies, current medications, past family history, past medical history, past social history, past surgical history and problem list.    Review of Systems   Constitutional: Positive for fatigue. Negative for activity change, appetite change and unexpected weight change.   HENT: Negative for nosebleeds and trouble swallowing.    Eyes: Negative for pain and visual disturbance.   Respiratory: Negative for chest tightness, shortness of breath and wheezing.    Cardiovascular: Negative for chest pain and palpitations.   Gastrointestinal: Negative for abdominal pain and blood in stool.   Endocrine: Negative.    Genitourinary: Negative for difficulty urinating and hematuria.   Musculoskeletal: Positive for arthralgias, back pain, myalgias and neck pain. Negative for joint swelling.   Skin: Negative for color change and rash.   Allergic/Immunologic: Negative.    Neurological: Negative for syncope and speech difficulty.   Hematological: Negative for adenopathy.   Psychiatric/Behavioral: Positive for dysphoric mood and sleep disturbance. Negative for agitation and confusion.   All other systems reviewed and are negative.      Objective   Physical Exam   Constitutional: She is oriented to person, place, and time. She appears well-developed and well-nourished. No distress.   HENT:   Head: Normocephalic and atraumatic.   Eyes: Conjunctivae and EOM are normal. Pupils are equal, round, and reactive to light. Right eye exhibits no discharge. Left eye exhibits no discharge. No scleral icterus.   Neck: Normal range of motion. Neck supple. No tracheal deviation present. No thyromegaly present.   Cardiovascular: Normal rate, regular rhythm, normal heart sounds, intact distal pulses and normal pulses. Exam reveals no gallop.   No murmur heard.  Pulmonary/Chest: Effort normal and breath sounds normal. No respiratory  distress. She has no wheezes. She has no rales.   Musculoskeletal: Normal range of motion.   Neurological: She is alert and oriented to person, place, and time. She exhibits normal muscle tone. Coordination normal.   Skin: Skin is warm. No rash noted. No erythema. No pallor.   Psychiatric: She has a normal mood and affect. Her behavior is normal. Judgment and thought content normal.   Nursing note and vitals reviewed.      Assessment/Plan   There are no diagnoses linked to this encounter.

## 2019-02-13 NOTE — PATIENT INSTRUCTIONS
Low glycemic index diet  Exercise 30 minutes most days of the week  Make sure you get results on any labs or tests we ordered today  We discussed medications and how to take them as prescribed  Sleep 6-8 hours each night if possible  If you have not signed up for [email or web address], please activate your code ASAP from your After Visit Summary today    LDL goal <100  LDL goal if heart disease <70  HDL goal >60  Triglyceride goal <150  BP goal =<130/80  Fasting glucose <100    Warned patient that this medication can cause drowsiness and impair them operating machinery, including driving a car.  Caution is advised.

## 2019-04-04 RX ORDER — AMIODARONE HYDROCHLORIDE 200 MG/1
TABLET ORAL
Qty: 90 TABLET | Refills: 0 | Status: SHIPPED | OUTPATIENT
Start: 2019-04-04 | End: 2019-07-09 | Stop reason: SDUPTHER

## 2019-04-25 ENCOUNTER — OFFICE VISIT (OUTPATIENT)
Dept: CARDIOLOGY | Facility: CLINIC | Age: 74
End: 2019-04-25

## 2019-04-25 VITALS
DIASTOLIC BLOOD PRESSURE: 80 MMHG | WEIGHT: 172 LBS | HEART RATE: 71 BPM | SYSTOLIC BLOOD PRESSURE: 148 MMHG | HEIGHT: 64 IN | BODY MASS INDEX: 29.37 KG/M2

## 2019-04-25 DIAGNOSIS — I10 BENIGN ESSENTIAL HYPERTENSION: ICD-10-CM

## 2019-04-25 DIAGNOSIS — I48.0 PAF (PAROXYSMAL ATRIAL FIBRILLATION) (HCC): Primary | ICD-10-CM

## 2019-04-25 PROCEDURE — 93000 ELECTROCARDIOGRAM COMPLETE: CPT | Performed by: INTERNAL MEDICINE

## 2019-04-25 PROCEDURE — 99213 OFFICE O/P EST LOW 20 MIN: CPT | Performed by: INTERNAL MEDICINE

## 2019-04-25 NOTE — PROGRESS NOTES
Subjective:     Encounter Date:04/25/19      Patient ID: Miguelina Mueller is a 74 y.o. female.    Chief Complaint:  Hypertension   This is a chronic problem. The current episode started more than 1 year ago. The problem is controlled. Associated symptoms include malaise/fatigue.   Atrial Fibrillation   Presents for follow-up visit. Symptoms include dizziness, hypertension and weakness. Symptoms are negative for hypotension, pacemaker problem, syncope and tachycardia. The symptoms have been worsening. Past medical history includes atrial fibrillation.       74-year-old female history of hypertension and atrial fibrillation as well as chronic fatigue presents today for reevaluation.  Clinically she is doing the same no changes.  Her your EKG shows normal sinus rhythm she is had no symptoms.      Review of Systems   Constitution: Positive for weakness and malaise/fatigue.   Cardiovascular: Negative for syncope.   Neurological: Positive for dizziness.   All other systems reviewed and are negative.        ECG 12 Lead  Date/Time: 4/25/2019 3:02 PM  Performed by: Rajesh Dover MD  Authorized by: Rajesh Dover MD   Comparison: compared with previous ECG from 4/12/2018  Similar to previous ECG  Rhythm: sinus rhythm    Clinical impression: normal ECG               Objective:     Physical Exam   Constitutional: She is oriented to person, place, and time. She appears well-developed.   HENT:   Head: Normocephalic.   Eyes: Conjunctivae are normal.   Neck: Normal range of motion.   Cardiovascular: Normal rate, regular rhythm and normal heart sounds.   Pulmonary/Chest: Breath sounds normal.   Abdominal: Soft. Bowel sounds are normal.   Musculoskeletal: Normal range of motion. She exhibits no edema.   Neurological: She is alert and oriented to person, place, and time.   Skin: Skin is warm and dry.   Psychiatric: She has a normal mood and affect. Her behavior is normal.   Vitals reviewed.      Lab Review:        Assessment:          Diagnosis Plan   1. PAF (paroxysmal atrial fibrillation) (CMS/HCC)     2. Benign essential hypertension            Plan:       1.  Paroxysmal atrial fibrillation.  She remains in sinus rhythm.  I think patient is back to baseline.  2.  Hypertension.  Blood pressure is okay.    3.   at this point I think she is back to baseline continue the same encouraged continued exercise will follow her clinically.  Patient told to follow-up in 6-12 months      Atrial Fibrillation and Atrial Flutter  Assessment  • The patient has paroxysmal atrial fibrillation  • The patient's CHADS2-VASc score is 3  • A [id number] score of 2 or more is considered a high risk for a thromboembolic event    Plan  • Continue in atrial fibrillation with rate control  • Continue amiodarone for rhythm control  • Add diltiazem for rate control

## 2019-04-29 DIAGNOSIS — M79.7 FIBROMYALGIA: ICD-10-CM

## 2019-04-29 RX ORDER — TRAMADOL HYDROCHLORIDE 50 MG/1
TABLET ORAL
Qty: 120 TABLET | OUTPATIENT
Start: 2019-04-29

## 2019-05-23 ENCOUNTER — OFFICE VISIT (OUTPATIENT)
Dept: FAMILY MEDICINE CLINIC | Facility: CLINIC | Age: 74
End: 2019-05-23

## 2019-05-23 VITALS
SYSTOLIC BLOOD PRESSURE: 120 MMHG | TEMPERATURE: 98.5 F | HEIGHT: 64 IN | WEIGHT: 172 LBS | BODY MASS INDEX: 29.37 KG/M2 | HEART RATE: 75 BPM | DIASTOLIC BLOOD PRESSURE: 80 MMHG | OXYGEN SATURATION: 97 % | RESPIRATION RATE: 16 BRPM

## 2019-05-23 DIAGNOSIS — F41.9 ANXIETY: ICD-10-CM

## 2019-05-23 DIAGNOSIS — N18.30 CKD (CHRONIC KIDNEY DISEASE) STAGE 3, GFR 30-59 ML/MIN (HCC): ICD-10-CM

## 2019-05-23 DIAGNOSIS — M79.7 FIBROMYALGIA: ICD-10-CM

## 2019-05-23 DIAGNOSIS — M79.7 FIBROMYALGIA: Primary | ICD-10-CM

## 2019-05-23 DIAGNOSIS — F33.0 MILD EPISODE OF RECURRENT MAJOR DEPRESSIVE DISORDER (HCC): ICD-10-CM

## 2019-05-23 PROCEDURE — 99213 OFFICE O/P EST LOW 20 MIN: CPT | Performed by: PHYSICIAN ASSISTANT

## 2019-05-23 RX ORDER — TRAMADOL HYDROCHLORIDE 50 MG/1
50 TABLET ORAL EVERY 6 HOURS PRN
Qty: 120 TABLET | Refills: 2 | Status: SHIPPED | OUTPATIENT
Start: 2019-05-23 | End: 2019-08-21 | Stop reason: SDUPTHER

## 2019-05-23 NOTE — PROGRESS NOTES
Subjective   Miguelina Mueller is a 74 y.o. female.     History of Present Illness   Miguelina Mueller female 73 y.o. who presents today for follow up of Depression and Anxiety.  She reports medication does help and this is her best med and tried several. Onset of symptoms was approximately several years ago.  She denies current suicidal and homicidal ideation. Risk factors are chronic illness and chronic pain.  Previous treatment includes current Rx.  She complains of the following medication side effects: none.  The patient declines to go to counseling..  She remains on Ultram for Fibro pain and does help and tolerates it well.  The patient has read and signed the [facility] Controlled Substance Contract.  I will continue to see patient for regular follow up appointments.  They are well controlled on their medication.  NATA has been reviewed by me and is updated every 3 months. The patient is aware of the potential for addiction and dependence  Wants to wait on mammo and DEXA  Next labs to be July  I am watching renal functions; avoiding NSAIDs    Weight is down with diet    The following portions of the patient's history were reviewed and updated as appropriate: allergies, current medications, past family history, past medical history, past social history, past surgical history and problem list.    Review of Systems   Constitutional: Positive for fatigue. Negative for activity change, appetite change and unexpected weight change.   HENT: Negative for nosebleeds and trouble swallowing.    Eyes: Negative for pain and visual disturbance.   Respiratory: Negative for chest tightness, shortness of breath and wheezing.    Cardiovascular: Negative for chest pain and palpitations.   Gastrointestinal: Negative for abdominal pain and blood in stool.   Endocrine: Negative.    Genitourinary: Negative for difficulty urinating and hematuria.   Musculoskeletal: Positive for arthralgias, back pain, myalgias and neck pain. Negative  for joint swelling.   Skin: Negative for color change and rash.   Allergic/Immunologic: Negative.    Neurological: Negative for syncope and speech difficulty.   Hematological: Negative for adenopathy.   Psychiatric/Behavioral: Positive for dysphoric mood and sleep disturbance. Negative for agitation and confusion.   All other systems reviewed and are negative.      Objective   Physical Exam   Constitutional: She is oriented to person, place, and time. She appears well-developed and well-nourished. No distress.   HENT:   Head: Normocephalic and atraumatic.   Eyes: Conjunctivae and EOM are normal. Pupils are equal, round, and reactive to light. Right eye exhibits no discharge. Left eye exhibits no discharge. No scleral icterus.   Neck: Normal range of motion. Neck supple. No tracheal deviation present. No thyromegaly present.   Cardiovascular: Normal rate, regular rhythm, normal heart sounds, intact distal pulses and normal pulses. Exam reveals no gallop.   No murmur heard.  Pulmonary/Chest: Effort normal and breath sounds normal. No respiratory distress. She has no wheezes. She has no rales.   Musculoskeletal: Normal range of motion.   Neurological: She is alert and oriented to person, place, and time. She exhibits normal muscle tone. Coordination normal.   Skin: Skin is warm. No rash noted. No erythema. No pallor.   Psychiatric: She has a normal mood and affect. Her behavior is normal. Judgment and thought content normal.   Nursing note and vitals reviewed.      Assessment/Plan   Miguelina was seen today for anxiety.    Diagnoses and all orders for this visit:    Fibromyalgia    Mild episode of recurrent major depressive disorder (CMS/HCC)    CKD (chronic kidney disease) stage 3, GFR 30-59 ml/min (CMS/[facility])    Anxiety      Refill Ultram;  Labs July  Cont. Statin  Stay on B12 oral  Sees cardio--a fib  Watching renal functions

## 2019-05-23 NOTE — PATIENT INSTRUCTIONS
Low glycemic index diet  Exercise 30 minutes most days of the week  Make sure you get results on any labs or tests we ordered today  We discussed medications and how to take them as prescribed  Sleep 6-8 hours each night if possible  If you have not signed up for [employer], please activate your code ASAP from your After Visit Summary today    LDL goal <100  LDL goal if heart disease <70  HDL goal >60  Triglyceride goal <150  BP goal =<130/80  Fasting glucose <100

## 2019-07-01 ENCOUNTER — RESULTS ENCOUNTER (OUTPATIENT)
Dept: FAMILY MEDICINE CLINIC | Facility: CLINIC | Age: 74
End: 2019-07-01

## 2019-07-01 DIAGNOSIS — R73.01 IFG (IMPAIRED FASTING GLUCOSE): ICD-10-CM

## 2019-07-01 DIAGNOSIS — F33.0 MILD EPISODE OF RECURRENT MAJOR DEPRESSIVE DISORDER (HCC): ICD-10-CM

## 2019-07-01 DIAGNOSIS — N18.30 CKD (CHRONIC KIDNEY DISEASE) STAGE 3, GFR 30-59 ML/MIN (HCC): ICD-10-CM

## 2019-07-01 DIAGNOSIS — F41.9 ANXIETY: ICD-10-CM

## 2019-07-01 DIAGNOSIS — M79.7 FIBROMYALGIA: ICD-10-CM

## 2019-07-01 DIAGNOSIS — E78.2 MIXED HYPERLIPIDEMIA: ICD-10-CM

## 2019-07-01 DIAGNOSIS — E53.8 B12 DEFICIENCY: ICD-10-CM

## 2019-07-01 DIAGNOSIS — E55.9 VITAMIN D DEFICIENCY: ICD-10-CM

## 2019-07-10 RX ORDER — AMIODARONE HYDROCHLORIDE 200 MG/1
TABLET ORAL
Qty: 90 TABLET | Refills: 0 | Status: SHIPPED | OUTPATIENT
Start: 2019-07-10 | End: 2019-10-24 | Stop reason: SDUPTHER

## 2019-07-22 LAB
25(OH)D3+25(OH)D2 SERPL-MCNC: 36 NG/ML (ref 30–100)
ALBUMIN SERPL-MCNC: 4.2 G/DL (ref 3.5–4.8)
ALBUMIN/GLOB SERPL: 1.9 {RATIO} (ref 1.2–2.2)
ALP SERPL-CCNC: 130 IU/L (ref 39–117)
ALT SERPL-CCNC: 18 IU/L (ref 0–32)
AST SERPL-CCNC: 18 IU/L (ref 0–40)
BASOPHILS # BLD AUTO: 0.1 X10E3/UL (ref 0–0.2)
BASOPHILS NFR BLD AUTO: 1 %
BILIRUB SERPL-MCNC: 0.4 MG/DL (ref 0–1.2)
BUN SERPL-MCNC: 15 MG/DL (ref 8–27)
BUN/CREAT SERPL: 12 (ref 12–28)
CALCIUM SERPL-MCNC: 9.3 MG/DL (ref 8.7–10.3)
CHLORIDE SERPL-SCNC: 105 MMOL/L (ref 96–106)
CHOLEST SERPL-MCNC: 187 MG/DL (ref 100–199)
CO2 SERPL-SCNC: 23 MMOL/L (ref 20–29)
CREAT SERPL-MCNC: 1.3 MG/DL (ref 0.57–1)
EOSINOPHIL # BLD AUTO: 0.4 X10E3/UL (ref 0–0.4)
EOSINOPHIL NFR BLD AUTO: 5 %
ERYTHROCYTE [DISTWIDTH] IN BLOOD BY AUTOMATED COUNT: 13.7 % (ref 12.3–15.4)
FOLATE SERPL-MCNC: 7.8 NG/ML
GLOBULIN SER CALC-MCNC: 2.2 G/DL (ref 1.5–4.5)
GLUCOSE SERPL-MCNC: 96 MG/DL (ref 65–99)
HBA1C MFR BLD: 5.4 % (ref 4.8–5.6)
HCT VFR BLD AUTO: 44.9 % (ref 34–46.6)
HDLC SERPL-MCNC: 65 MG/DL
HGB BLD-MCNC: 15.3 G/DL (ref 11.1–15.9)
IMM GRANULOCYTES # BLD AUTO: 0 X10E3/UL (ref 0–0.1)
IMM GRANULOCYTES NFR BLD AUTO: 0 %
LDLC SERPL CALC-MCNC: 97 MG/DL (ref 0–99)
LYMPHOCYTES # BLD AUTO: 1.6 X10E3/UL (ref 0.7–3.1)
LYMPHOCYTES NFR BLD AUTO: 18 %
MCH RBC QN AUTO: 30.4 PG (ref 26.6–33)
MCHC RBC AUTO-ENTMCNC: 34.1 G/DL (ref 31.5–35.7)
MCV RBC AUTO: 89 FL (ref 79–97)
MONOCYTES # BLD AUTO: 0.6 X10E3/UL (ref 0.1–0.9)
MONOCYTES NFR BLD AUTO: 7 %
NEUTROPHILS # BLD AUTO: 6.1 X10E3/UL (ref 1.4–7)
NEUTROPHILS NFR BLD AUTO: 69 %
PLATELET # BLD AUTO: 207 X10E3/UL (ref 150–450)
POTASSIUM SERPL-SCNC: 4.6 MMOL/L (ref 3.5–5.2)
PROT SERPL-MCNC: 6.4 G/DL (ref 6–8.5)
RBC # BLD AUTO: 5.03 X10E6/UL (ref 3.77–5.28)
SODIUM SERPL-SCNC: 144 MMOL/L (ref 134–144)
T3FREE SERPL-MCNC: 2.6 PG/ML (ref 2–4.4)
T4 FREE SERPL-MCNC: 1.24 NG/DL (ref 0.82–1.77)
TRIGL SERPL-MCNC: 127 MG/DL (ref 0–149)
TSH SERPL DL<=0.005 MIU/L-ACNC: 1.9 UIU/ML (ref 0.45–4.5)
VIT B12 SERPL-MCNC: 907 PG/ML (ref 232–1245)
VLDLC SERPL CALC-MCNC: 25 MG/DL (ref 5–40)
WBC # BLD AUTO: 8.8 X10E3/UL (ref 3.4–10.8)

## 2019-07-22 RX ORDER — FOLIC ACID 1 MG/1
1 TABLET ORAL DAILY
Qty: 90 TABLET | Refills: 1 | Status: SHIPPED | OUTPATIENT
Start: 2019-07-22 | End: 2019-10-24 | Stop reason: SDUPTHER

## 2019-07-25 DIAGNOSIS — M79.7 FIBROMYALGIA: ICD-10-CM

## 2019-07-25 RX ORDER — TRAMADOL HYDROCHLORIDE 50 MG/1
TABLET ORAL
Qty: 120 TABLET | OUTPATIENT
Start: 2019-07-25

## 2019-08-14 RX ORDER — CHOLECALCIFEROL (VITAMIN D3) 50 MCG
CAPSULE ORAL
Qty: 90 CAPSULE | Refills: 3 | Status: SHIPPED | OUTPATIENT
Start: 2019-08-14 | End: 2020-02-06 | Stop reason: SDUPTHER

## 2019-08-21 ENCOUNTER — OFFICE VISIT (OUTPATIENT)
Dept: FAMILY MEDICINE CLINIC | Facility: CLINIC | Age: 74
End: 2019-08-21

## 2019-08-21 VITALS
DIASTOLIC BLOOD PRESSURE: 70 MMHG | OXYGEN SATURATION: 96 % | HEART RATE: 61 BPM | SYSTOLIC BLOOD PRESSURE: 110 MMHG | RESPIRATION RATE: 16 BRPM | TEMPERATURE: 97.6 F | WEIGHT: 177 LBS | BODY MASS INDEX: 30.22 KG/M2 | HEIGHT: 64 IN

## 2019-08-21 DIAGNOSIS — E53.8 B12 DEFICIENCY: ICD-10-CM

## 2019-08-21 DIAGNOSIS — M79.7 FIBROMYALGIA: ICD-10-CM

## 2019-08-21 DIAGNOSIS — E55.9 VITAMIN D DEFICIENCY: ICD-10-CM

## 2019-08-21 DIAGNOSIS — M79.7 FIBROMYALGIA: Primary | ICD-10-CM

## 2019-08-21 DIAGNOSIS — R79.89 LFT ELEVATION: ICD-10-CM

## 2019-08-21 DIAGNOSIS — N18.30 CKD (CHRONIC KIDNEY DISEASE) STAGE 3, GFR 30-59 ML/MIN (HCC): ICD-10-CM

## 2019-08-21 DIAGNOSIS — F33.0 MILD EPISODE OF RECURRENT MAJOR DEPRESSIVE DISORDER (HCC): ICD-10-CM

## 2019-08-21 DIAGNOSIS — E53.8 LOW FOLIC ACID: ICD-10-CM

## 2019-08-21 DIAGNOSIS — E78.2 MIXED HYPERLIPIDEMIA: ICD-10-CM

## 2019-08-21 DIAGNOSIS — F41.9 ANXIETY: ICD-10-CM

## 2019-08-21 PROCEDURE — 99214 OFFICE O/P EST MOD 30 MIN: CPT | Performed by: PHYSICIAN ASSISTANT

## 2019-08-21 RX ORDER — TRAMADOL HYDROCHLORIDE 50 MG/1
50 TABLET ORAL EVERY 6 HOURS PRN
Qty: 120 TABLET | Refills: 2 | Status: SHIPPED | OUTPATIENT
Start: 2019-08-21 | End: 2019-10-21 | Stop reason: SDUPTHER

## 2019-08-21 NOTE — PROGRESS NOTES
Subjective   Miguelina Mueller is a 74 y.o. female.     History of Present Illness   .Miguelina Mueller female 73 y.o. who presents today for follow up of Depression and Anxiety.  She reports medication does help and this is her best med and tried several. Onset of symptoms was approximately several years ago.  She denies current suicidal and homicidal ideation. Risk factors are chronic illness and chronic pain.  Previous treatment includes current Rx.  She complains of the following medication side effects: none.  The patient declines to go to counseling..  She remains on Ultram for Fibro pain and does help and tolerates it well.  The patient has read and signed the [facility] Controlled Substance Contract.  I will continue to see patient for regular follow up appointments.  They are well controlled on their medication.  NATA has been reviewed by me and is updated every 3 months. The patient is aware of the potential for addiction and dependence. She remains on Ultram for Fibro pain and does help and tolerates it well.  Wants to wait on mammo and DEXA  I am watching renal functions; avoiding NSAIDs    Miguelina Mueller 74 y.o. female who presents today for routine follow up check and medication refills.  she has a history of   Patient Active Problem List   Diagnosis   • Anxiety   • Mild episode of recurrent major depressive disorder (CMS/HCC)   • Fibromyalgia   • Hyperlipidemia   • Benign essential hypertension   • IFG (impaired fasting glucose)   • Renal insufficiency   • Shoulder bursitis   • Seasonal allergic rhinitis   • PAF (paroxysmal atrial fibrillation) (CMS/HCC)   • CKD (chronic kidney disease) stage 3, GFR 30-59 ml/min (CMS/HCC)   • Vitamin D deficiency   • B12 deficiency   • Low folic acid   .  Since the last visit, she has overall felt tired.  She has Hyperlipidemia with goals met with current Rx, Atrial Fibillation and remains under the care of their cardiologist for management and Vitamin D deficiency and  labs are at goal >30 ng/mL.  she has been compliant with current medications have reviewed them.  The patient denies medication side effects.    I did add B12 2018 and just did f/u and goal met; I did add folic acid July and get f/u labs Oct  I will also watch CKD and alk phos  Results for orders placed or performed in visit on 07/01/19   Comprehensive metabolic panel   Result Value Ref Range    Glucose 96 65 - 99 mg/dL    BUN 15 8 - 27 mg/dL    Creatinine 1.30 (H) 0.57 - 1.00 mg/dL    eGFR Non African Am 41 (L) >59 mL/min/1.73    eGFR  Am 47 (L) >59 mL/min/1.73    BUN/Creatinine Ratio 12 12 - 28    Sodium 144 134 - 144 mmol/L    Potassium 4.6 3.5 - 5.2 mmol/L    Chloride 105 96 - 106 mmol/L    Total CO2 23 20 - 29 mmol/L    Calcium 9.3 8.7 - 10.3 mg/dL    Total Protein 6.4 6.0 - 8.5 g/dL    Albumin 4.2 3.5 - 4.8 g/dL    Globulin 2.2 1.5 - 4.5 g/dL    A/G Ratio 1.9 1.2 - 2.2    Total Bilirubin 0.4 0.0 - 1.2 mg/dL    Alkaline Phosphatase 130 (H) 39 - 117 IU/L    AST (SGOT) 18 0 - 40 IU/L    ALT (SGPT) 18 0 - 32 IU/L   Lipid panel   Result Value Ref Range    Total Cholesterol 187 100 - 199 mg/dL    Triglycerides 127 0 - 149 mg/dL    HDL Cholesterol 65 >39 mg/dL    VLDL Cholesterol 25 5 - 40 mg/dL    LDL Cholesterol  97 0 - 99 mg/dL   TSH   Result Value Ref Range    TSH 1.900 0.450 - 4.500 uIU/mL   Hemoglobin A1c   Result Value Ref Range    Hemoglobin A1C 5.4 4.8 - 5.6 %   T3, Free   Result Value Ref Range    T3, Free 2.6 2.0 - 4.4 pg/mL   T4, Free   Result Value Ref Range    Free T4 1.24 0.82 - 1.77 ng/dL   Vitamin B12   Result Value Ref Range    Vitamin B-12 907 232 - 1,245 pg/mL   Folate   Result Value Ref Range    Folate 7.8 >3.0 ng/mL   Vitamin D 25 Hydroxy   Result Value Ref Range    25 Hydroxy, Vitamin D 36.0 30.0 - 100.0 ng/mL   CBC and Differential   Result Value Ref Range    WBC 8.8 3.4 - 10.8 x10E3/uL    RBC 5.03 3.77 - 5.28 x10E6/uL    Hemoglobin 15.3 11.1 - 15.9 g/dL    Hematocrit 44.9 34.0 - 46.6 %     MCV 89 79 - 97 fL    MCH 30.4 26.6 - 33.0 pg    MCHC 34.1 31.5 - 35.7 g/dL    RDW 13.7 12.3 - 15.4 %    Platelets 207 150 - 450 x10E3/uL    Neutrophil Rel % 69 Not Estab. %    Lymphocyte Rel % 18 Not Estab. %    Monocyte Rel % 7 Not Estab. %    Eosinophil Rel % 5 Not Estab. %    Basophil Rel % 1 Not Estab. %    Neutrophils Absolute 6.1 1.4 - 7.0 x10E3/uL    Lymphocytes Absolute 1.6 0.7 - 3.1 x10E3/uL    Monocytes Absolute 0.6 0.1 - 0.9 x10E3/uL    Eosinophils Absolute 0.4 0.0 - 0.4 x10E3/uL    Basophils Absolute 0.1 0.0 - 0.2 x10E3/uL    Immature Granulocyte Rel % 0 Not Estab. %    Immature Grans Absolute 0.0 0.0 - 0.1 x10E3/uL   I do want her to try CBD oil and see if helps pain; go to compound pharmacy; this may cause drug screen + marijuana       The following portions of the patient's history were reviewed and updated as appropriate: allergies, current medications, past family history, past medical history, past social history, past surgical history and problem list.    Review of Systems   Constitutional: Positive for fatigue. Negative for activity change, appetite change and unexpected weight change.   HENT: Negative for nosebleeds and trouble swallowing.    Eyes: Negative for pain and visual disturbance.   Respiratory: Negative for chest tightness, shortness of breath and wheezing.    Cardiovascular: Negative for chest pain and palpitations.   Gastrointestinal: Negative for abdominal pain and blood in stool.   Endocrine: Negative.    Genitourinary: Negative for difficulty urinating and hematuria.   Musculoskeletal: Positive for arthralgias, back pain, myalgias and neck pain. Negative for joint swelling.   Skin: Negative for color change and rash.   Allergic/Immunologic: Negative.    Neurological: Negative for syncope and speech difficulty.   Hematological: Negative for adenopathy.   Psychiatric/Behavioral: Positive for dysphoric mood and sleep disturbance. Negative for agitation and confusion.   All other  systems reviewed and are negative.      Objective   Physical Exam   Constitutional: She is oriented to person, place, and time. She appears well-developed and well-nourished. No distress.   HENT:   Head: Normocephalic and atraumatic.   Eyes: Conjunctivae and EOM are normal. Pupils are equal, round, and reactive to light. Right eye exhibits no discharge. Left eye exhibits no discharge. No scleral icterus.   Neck: Normal range of motion. Neck supple. No tracheal deviation present. No thyromegaly present.   Cardiovascular: Normal rate, regular rhythm, normal heart sounds, intact distal pulses and normal pulses. Exam reveals no gallop.   No murmur heard.  Pulmonary/Chest: Effort normal and breath sounds normal. No respiratory distress. She has no wheezes. She has no rales.   Musculoskeletal: Normal range of motion.   Neurological: She is alert and oriented to person, place, and time. She exhibits normal muscle tone. Coordination normal.   Skin: Skin is warm. No rash noted. No erythema. No pallor.   Psychiatric: She has a normal mood and affect. Her behavior is normal. Judgment and thought content normal.   Nursing note and vitals reviewed.      Assessment/Plan   Problems Addressed this Visit        Cardiovascular and Mediastinum    Hyperlipidemia       Digestive    Vitamin D deficiency    B12 deficiency       Nervous and Auditory    Fibromyalgia - Primary       Genitourinary    CKD (chronic kidney disease) stage 3, GFR 30-59 ml/min (CMS/HCC)       Other    Anxiety    Mild episode of recurrent major depressive disorder (CMS/HCC)    Low folic acid        Plan, Miguelina Mueller, was seen today.  she was seen for Hyperlipidemia and will continue current medication, Atrial Fibrillation and remains under the care of their cardiologist for medical management and Vitamin D deficiency and supplemented.    Refill Ultram for pain and does help  Cont. Cymbalta and helps some  Labs Oct and added folic acid to B12; on Vit D

## 2019-08-21 NOTE — PATIENT INSTRUCTIONS
Dyslipidemia  Dyslipidemia is an imbalance of waxy, fat-like substances (lipids) in the blood. The body needs lipids in small amounts. Dyslipidemia often involves a high level of cholesterol or triglycerides, which are types of lipids.  Common forms of dyslipidemia include:  · High levels of bad cholesterol (LDL cholesterol). LDL is the type of cholesterol that causes fatty deposits (plaques) to build up in the blood vessels that carry blood away from your heart (arteries).  · Low levels of good cholesterol (HDL cholesterol). HDL cholesterol is the type of cholesterol that protects against heart disease. High levels of HDL remove the LDL buildup from arteries.  · High levels of triglycerides. Triglycerides are a fatty substance in the blood that is linked to a buildup of plaques in the arteries.  You can develop dyslipidemia because of the genes you are born with (primary dyslipidemia) or changes that occur during your life (secondary dyslipidemia), or as a side effect of certain medical treatments.  What are the causes?  Primary dyslipidemia is caused by changes (mutations) in genes that are passed down through families (inherited). These mutations cause several types of dyslipidemia. Mutations can result in disorders that make the body produce too much LDL cholesterol or triglycerides, or not enough HDL cholesterol. These disorders may lead to heart disease, arterial disease, or stroke at an early age.  Causes of secondary dyslipidemia include certain lifestyle choices and diseases that lead to dyslipidemia, such as:  · Eating a diet that is high in animal fat.  · Not getting enough activity or exercise (having a sedentary lifestyle).  · Having diabetes, kidney disease, liver disease, or thyroid disease.  · Drinking large amounts of alcohol.  · Using certain types of drugs.  What increases the risk?  You may be at greater risk for dyslipidemia if you are an older man or if you are a woman who has gone through  menopause. Other risk factors include:  · Having a family history of dyslipidemia.  · Taking certain medicines, including birth control pills, steroids, some diuretics, beta-blockers, and some medicines for HIV.  · Smoking cigarettes.  · Eating a high-fat diet.  · Drinking large amounts of alcohol.  · Having certain medical conditions such as diabetes, polycystic ovary syndrome (PCOS), pregnancy, kidney disease, liver disease, or hypothyroidism.  · Not exercising regularly.  · Being overweight or obese with too much belly fat.  What are the signs or symptoms?  Dyslipidemia does not usually cause any symptoms.  Very high lipid levels can cause fatty bumps under the skin (xanthomas) or a white or gray ring around the black center (pupil) of the eye. Very high triglyceride levels can cause inflammation of the pancreas (pancreatitis).  How is this diagnosed?  Your health care provider may diagnose dyslipidemia based on a routine blood test (fasting blood test). Because most people do not have symptoms of the condition, this blood testing (lipid profile) is done on adults age 20 and older and is repeated every 5 years. This test checks:  · Total cholesterol. This is a measure of the total amount of cholesterol in your blood, including LDL cholesterol, HDL cholesterol, and triglycerides. A healthy number is below 200.  · LDL cholesterol. The target number for LDL cholesterol is different for each person, depending on individual risk factors. For most people, a number below 100 is healthy. Ask your health care provider what your LDL cholesterol number should be.  · HDL cholesterol. An HDL level of 60 or higher is best because it helps to protect against heart disease. A number below 40 for men or below 50 for women increases the risk for heart disease.  · Triglycerides. A healthy triglyceride number is below 150.  If your lipid profile is abnormal, your health care provider may do other blood tests to get more information  about your condition.  How is this treated?  Treatment depends on the type of dyslipidemia that you have and your other risk factors for heart disease and stroke. Your health care provider will have a target range for your lipid levels based on this information.  For many people, treatment starts with lifestyle changes, such as diet and exercise. Your health care provider may recommend that you:  · Get regular exercise.  · Make changes to your diet.  · Quit smoking if you smoke.  If diet changes and exercise do not help you reach your goals, your health care provider may also prescribe medicine to lower lipids. The most commonly prescribed type of medicine lowers your LDL cholesterol (statin drug). If you have a high triglyceride level, your provider may prescribe another type of drug (fibrate) or an omega-3 fish oil supplement, or both.  Follow these instructions at home:    · Take over-the-counter and prescription medicines only as told by your health care provider. This includes supplements.  · Get regular exercise. Start an aerobic exercise and strength training program as told by your health care provider. Ask your health care provider what activities are safe for you. Your health care provider may recommend:  ? 30 minutes of aerobic activity 4-6 days a week. Brisk walking is an example of aerobic activity.  ? Strength training 2 days a week.  · Eat a healthy diet as told by your health care provider. This can help you reach and maintain a healthy weight, lower your LDL cholesterol, and raise your HDL cholesterol. It may help to work with a diet and nutrition specialist (dietitian) to make a plan that is right for you. Your dietitian or health care provider may recommend:  ? Limiting your calories, if you are overweight.  ? Eating more fruits, vegetables, whole grains, fish, and lean meats.  ? Limiting saturated fat, trans fat, and cholesterol.  · Follow instructions from your health care provider or dietitian  about eating or drinking restrictions.  · Limit alcohol intake to no more than one drink per day for nonpregnant women and two drinks per day for men. One drink equals 12 oz of beer, 5 oz of wine, or 1½ oz of hard liquor.  · Do not use any products that contain nicotine or tobacco, such as cigarettes and e-cigarettes. If you need help quitting, ask your health care provider.  · Keep all follow-up visits as told by your health care provider. This is important.  Contact a health care provider if:  · You are having trouble sticking to your exercise or diet plan.  · You are struggling to quit smoking or control your use of alcohol.  Summary  · Dyslipidemia is an imbalance of waxy, fat-like substances (lipids) in the blood. The body needs lipids in small amounts. Dyslipidemia often involves a high level of cholesterol or triglycerides, which are types of lipids.  · Treatment depends on the type of dyslipidemia that you have and your other risk factors for heart disease and stroke.  · For many people, treatment starts with lifestyle changes, such as diet and exercise. Your health care provider may also prescribe medicine to lower lipids.  This information is not intended to replace advice given to you by your health care provider. Make sure you discuss any questions you have with your health care provider.  Document Released: 12/23/2014 Document Revised: 08/14/2017 Document Reviewed: 08/14/2017  [employer] Patient Education © 2019 [employer]

## 2019-09-02 ENCOUNTER — APPOINTMENT (OUTPATIENT)
Dept: GENERAL RADIOLOGY | Facility: HOSPITAL | Age: 74
End: 2019-09-02

## 2019-09-02 ENCOUNTER — HOSPITAL ENCOUNTER (EMERGENCY)
Facility: HOSPITAL | Age: 74
Discharge: HOME | End: 2019-09-02
Attending: EMERGENCY MEDICINE | Admitting: EMERGENCY MEDICINE

## 2019-09-02 VITALS
DIASTOLIC BLOOD PRESSURE: 78 MMHG | SYSTOLIC BLOOD PRESSURE: 157 MMHG | BODY MASS INDEX: 30.05 KG/M2 | WEIGHT: 176 LBS | HEIGHT: 64 IN | OXYGEN SATURATION: 98 % | RESPIRATION RATE: 18 BRPM | HEART RATE: 71 BPM | TEMPERATURE: 99.8 F

## 2019-09-02 DIAGNOSIS — I10 HYPERTENSION, UNSPECIFIED TYPE: ICD-10-CM

## 2019-09-02 DIAGNOSIS — J20.9 ACUTE BRONCHITIS, UNSPECIFIED ORGANISM: Primary | ICD-10-CM

## 2019-09-02 DIAGNOSIS — R06.00 DYSPNEA, UNSPECIFIED TYPE: ICD-10-CM

## 2019-09-02 LAB
ALBUMIN SERPL-MCNC: 4.1 G/DL (ref 3.5–5.2)
ALBUMIN/GLOB SERPL: 1.4 G/DL
ALP SERPL-CCNC: 115 U/L (ref 39–117)
ALT SERPL W P-5'-P-CCNC: 15 U/L (ref 1–33)
ANION GAP SERPL CALCULATED.3IONS-SCNC: 13.4 MMOL/L (ref 5–15)
AST SERPL-CCNC: 20 U/L (ref 1–32)
BASOPHILS # BLD AUTO: 0.08 10*3/MM3 (ref 0–0.2)
BASOPHILS NFR BLD AUTO: 0.6 % (ref 0–1.5)
BILIRUB SERPL-MCNC: 0.5 MG/DL (ref 0.2–1.2)
BUN BLD-MCNC: 15 MG/DL (ref 8–23)
BUN/CREAT SERPL: 13.8 (ref 7–25)
CALCIUM SPEC-SCNC: 9.5 MG/DL (ref 8.6–10.5)
CHLORIDE SERPL-SCNC: 99 MMOL/L (ref 98–107)
CO2 SERPL-SCNC: 23.6 MMOL/L (ref 22–29)
CREAT BLD-MCNC: 1.09 MG/DL (ref 0.57–1)
DEPRECATED RDW RBC AUTO: 49.5 FL (ref 37–54)
EOSINOPHIL # BLD AUTO: 0.4 10*3/MM3 (ref 0–0.4)
EOSINOPHIL NFR BLD AUTO: 3.1 % (ref 0.3–6.2)
ERYTHROCYTE [DISTWIDTH] IN BLOOD BY AUTOMATED COUNT: 13.7 % (ref 12.3–15.4)
GFR SERPL CREATININE-BSD FRML MDRD: 49 ML/MIN/1.73
GLOBULIN UR ELPH-MCNC: 2.9 GM/DL
GLUCOSE BLD-MCNC: 104 MG/DL (ref 65–99)
HCT VFR BLD AUTO: 47.6 % (ref 34–46.6)
HGB BLD-MCNC: 15 G/DL (ref 12–15.9)
HOLD SPECIMEN: NORMAL
HOLD SPECIMEN: NORMAL
IMM GRANULOCYTES # BLD AUTO: 0.05 10*3/MM3 (ref 0–0.05)
IMM GRANULOCYTES NFR BLD AUTO: 0.4 % (ref 0–0.5)
INR PPP: 0.95 (ref 0.9–1.1)
LYMPHOCYTES # BLD AUTO: 2.25 10*3/MM3 (ref 0.7–3.1)
LYMPHOCYTES NFR BLD AUTO: 17.4 % (ref 19.6–45.3)
MCH RBC QN AUTO: 30.5 PG (ref 26.6–33)
MCHC RBC AUTO-ENTMCNC: 31.5 G/DL (ref 31.5–35.7)
MCV RBC AUTO: 96.7 FL (ref 79–97)
MONOCYTES # BLD AUTO: 1.02 10*3/MM3 (ref 0.1–0.9)
MONOCYTES NFR BLD AUTO: 7.9 % (ref 5–12)
NEUTROPHILS # BLD AUTO: 9.12 10*3/MM3 (ref 1.7–7)
NEUTROPHILS NFR BLD AUTO: 70.6 % (ref 42.7–76)
NRBC BLD AUTO-RTO: 0 /100 WBC (ref 0–0.2)
NT-PROBNP SERPL-MCNC: 345.8 PG/ML (ref 5–900)
PLATELET # BLD AUTO: 201 10*3/MM3 (ref 140–450)
PMV BLD AUTO: 11.4 FL (ref 6–12)
POTASSIUM BLD-SCNC: 4.1 MMOL/L (ref 3.5–5.2)
PROT SERPL-MCNC: 7 G/DL (ref 6–8.5)
PROTHROMBIN TIME: 12.4 SECONDS (ref 11.7–14.2)
RBC # BLD AUTO: 4.92 10*6/MM3 (ref 3.77–5.28)
SODIUM BLD-SCNC: 136 MMOL/L (ref 136–145)
TROPONIN T SERPL-MCNC: <0.01 NG/ML (ref 0–0.03)
WBC NRBC COR # BLD: 12.92 10*3/MM3 (ref 3.4–10.8)
WHOLE BLOOD HOLD SPECIMEN: NORMAL
WHOLE BLOOD HOLD SPECIMEN: NORMAL

## 2019-09-02 PROCEDURE — 85025 COMPLETE CBC W/AUTO DIFF WBC: CPT | Performed by: EMERGENCY MEDICINE

## 2019-09-02 PROCEDURE — 80053 COMPREHEN METABOLIC PANEL: CPT | Performed by: EMERGENCY MEDICINE

## 2019-09-02 PROCEDURE — 85610 PROTHROMBIN TIME: CPT | Performed by: EMERGENCY MEDICINE

## 2019-09-02 PROCEDURE — 84484 ASSAY OF TROPONIN QUANT: CPT | Performed by: EMERGENCY MEDICINE

## 2019-09-02 PROCEDURE — 93010 ELECTROCARDIOGRAM REPORT: CPT | Performed by: INTERNAL MEDICINE

## 2019-09-02 PROCEDURE — 71046 X-RAY EXAM CHEST 2 VIEWS: CPT

## 2019-09-02 PROCEDURE — 93005 ELECTROCARDIOGRAM TRACING: CPT | Performed by: EMERGENCY MEDICINE

## 2019-09-02 PROCEDURE — 99284 EMERGENCY DEPT VISIT MOD MDM: CPT

## 2019-09-02 PROCEDURE — 83880 ASSAY OF NATRIURETIC PEPTIDE: CPT | Performed by: EMERGENCY MEDICINE

## 2019-09-02 RX ORDER — SODIUM CHLORIDE 0.9 % (FLUSH) 0.9 %
10 SYRINGE (ML) INJECTION AS NEEDED
Status: DISCONTINUED | OUTPATIENT
Start: 2019-09-02 | End: 2019-09-02 | Stop reason: HOSPADM

## 2019-09-02 RX ORDER — DOXYCYCLINE HYCLATE 100 MG/1
100 CAPSULE ORAL 2 TIMES DAILY
Qty: 14 CAPSULE | Refills: 0 | Status: SHIPPED | OUTPATIENT
Start: 2019-09-02 | End: 2019-09-09

## 2019-09-02 NOTE — ED PROVIDER NOTES
EMERGENCY DEPARTMENT ENCOUNTER    CHIEF COMPLAINT  Chief Complaint: Shortness of air  History given by: Patient  History limited by: None  Room Number: 07/07  PMD: Yuki Pennington PA-C  Cardiologist: Dr. Haradan, MD    HPI:  Pt is a 74 y.o. female who presents complaining of increased shortness of air and cough for 3 days that has worsened. She denies use of O2 at home. She denies abdominal pain, leg swelling, nausea, or vomiting. Pt is not on anticoagulants. She reports hx of chronic fatigue syndrome.     Duration:  3 days   Onset: gradual  Timing: constant  Location: respiratory  Radiation: none  Quality: SOA  Intensity/Severity: moderate  Progression: worsened  Associated Symptoms: cough  Aggravating Factors: none  Alleviating Factors: none  Previous Episodes: no  Treatment before arrival: none    PAST MEDICAL HISTORY  Active Ambulatory Problems     Diagnosis Date Noted   • Anxiety    • Mild episode of recurrent major depressive disorder (CMS/HCC)    • Fibromyalgia    • Hyperlipidemia    • Benign essential hypertension    • IFG (impaired fasting glucose) 05/21/2014   • Renal insufficiency 06/04/2014   • Shoulder bursitis 05/19/2014   • Seasonal allergic rhinitis 05/14/2015   • PAF (paroxysmal atrial fibrillation) (CMS/HCC) 08/17/2017   • CKD (chronic kidney disease) stage 3, GFR 30-59 ml/min (CMS/[facility]) 05/29/2018   • Vitamin D deficiency 05/29/2018   • B12 deficiency 08/24/2018   • Low folic acid 08/21/2019     Resolved Ambulatory Problems     Diagnosis Date Noted   • No Resolved Ambulatory Problems     Past Medical History:   Diagnosis Date   • Anxiety    • Atrial fibrillation (CMS/HCC)    • Attention deficit disorder    • Benign essential hypertension    • Depression    • Diastolic dysfunction    • Dizziness    • Encounter for eye exam    • Fibrocystic breast disease    • Fibromyalgia    • History of bone density study    • History of CT scan of abdomen 01/05/2011   • History of echocardiogram 09/29/2015   •  History of exercise stress test 09/29/2015   • History of pelvic ultrasound 12/11/2009   • Hyperlipidemia    • Hypertension    • Hypokalemia    • IFG (impaired fasting glucose) 05/21/2014   • Malaise and fatigue    • PAF (paroxysmal atrial fibrillation) (CMS/[facility])    • Postmenopausal    • Pulmonary nodule 10/2010   • Pyelonephritis 10/2010   • Renal insufficiency 06/04/2014   • Seasonal allergic rhinitis 5/14/2015   • Sepsis (CMS/[facility]) 10/2010   • Shoulder bursitis 05/19/2014   • Thrombocytopenia (CMS/[facility]) 10/2010   • Weakness        PAST SURGICAL HISTORY  Past Surgical History:   Procedure Laterality Date   • COLONOSCOPY     • ROTATOR CUFF REPAIR Right 07/30/2014       FAMILY HISTORY  Family History   Problem Relation Age of Onset   • Heart disease Mother    • Thyroid disease Mother    • Emphysema Father    • Cancer Brother    • Heart disease Brother        SOCIAL HISTORY  Social History     Socioeconomic History   • Marital status:      Spouse name: Not on file   • Number of children: Not on file   • Years of education: Not on file   • Highest education level: Not on file   Tobacco Use   • Smoking status: Former Smoker     Packs/day: 0.25   • Smokeless tobacco: Never Used   • Tobacco comment: quit around 2007   Substance and Sexual Activity   • Alcohol use: Yes     Comment: rare    ///     caffeine use   • Drug use: No   • Sexual activity: Defer       ALLERGIES  Patient has no known allergies.    REVIEW OF SYSTEMS  Review of Systems   Constitutional: Negative for chills and fever.   HENT: Negative for rhinorrhea and sore throat.    Eyes: Negative for visual disturbance.   Respiratory: Positive for cough and shortness of breath.    Cardiovascular: Negative for chest pain, palpitations and leg swelling.   Gastrointestinal: Negative for abdominal pain, diarrhea and vomiting.   Endocrine: Negative.    Genitourinary: Negative for decreased urine volume, dysuria and frequency.   Musculoskeletal: Negative for neck  pain.   Skin: Negative for rash.   Neurological: Negative for syncope and headaches.   Psychiatric/Behavioral: Negative.    All other systems reviewed and are negative.      PHYSICAL EXAM  ED Triage Vitals [09/02/19 1037]   Temp Heart Rate Resp BP SpO2   99.8 °F (37.7 °C) 95 18 -- 95 %      Temp src Heart Rate Source Patient Position BP Location FiO2 (%)   Tympanic -- -- -- --       Physical Exam   Constitutional: She is oriented to person, place, and time and well-developed, well-nourished, and in no distress.  Non-toxic appearance. No distress.   HENT:   Head: Normocephalic and atraumatic.   Mouth/Throat: Mucous membranes are normal.   Eyes: Pupils are equal, round, and reactive to light.   Neck: Normal range of motion. Neck supple.   Cardiovascular: Normal rate, regular rhythm and normal heart sounds. Exam reveals no gallop and no friction rub.   No murmur heard.  Pulses:       Posterior tibial pulses are 2+ on the right side, and 2+ on the left side.   Pulmonary/Chest: Effort normal. No respiratory distress. She has no wheezes. She has rhonchi (occasional). She has no rales.   Abdominal: Soft. Bowel sounds are normal. There is no tenderness. There is no rebound and no guarding.   Neurological: She is alert and oriented to person, place, and time. She has normal strength.   Skin: Skin is warm, dry and intact.   Psychiatric: Mood, affect and judgment normal.   Nursing note and vitals reviewed.      LAB RESULTS  Lab Results (last 24 hours)     Procedure Component Value Units Date/Time    Protime-INR [[id number]]  (Normal) Collected:  09/02/19 1049    Specimen:  Blood Updated:  09/02/19 1119     Protime 12.4 Seconds      INR 0.95    CBC & Differential [[id number]] Collected:  09/02/19 1050    Specimen:  Blood Updated:  09/02/19 1110    Narrative:       The following orders were created for panel order CBC & Differential.  Procedure                               Abnormality         Status                     ---------                                -----------         ------                     CBC Auto Differential[[id number]]        Abnormal            Final result                 Please view results for these tests on the individual orders.    Comprehensive Metabolic Panel [[id number]]  (Abnormal) Collected:  09/02/19 1050    Specimen:  Blood Updated:  09/02/19 1131     Glucose 104 mg/dL      BUN 15 mg/dL      Creatinine 1.09 mg/dL      Sodium 136 mmol/L      Potassium 4.1 mmol/L      Chloride 99 mmol/L      CO2 23.6 mmol/L      Calcium 9.5 mg/dL      Total Protein 7.0 g/dL      Albumin 4.10 g/dL      ALT (SGPT) 15 U/L      AST (SGOT) 20 U/L      Alkaline Phosphatase 115 U/L      Total Bilirubin 0.5 mg/dL      eGFR Non African Amer 49 mL/min/1.73      Globulin 2.9 gm/dL      A/G Ratio 1.4 g/dL      BUN/Creatinine Ratio 13.8     Anion Gap 13.4 mmol/L     Narrative:       GFR Normal >60  Chronic Kidney Disease <60  Kidney Failure <15    BNP [[id number]]  (Normal) Collected:  09/02/19 1050    Specimen:  Blood Updated:  09/02/19 1129     proBNP 345.8 pg/mL     Narrative:       Among patients with dyspnea, NT-proBNP is highly sensitive for the detection of acute congestive heart failure. In addition NT-proBNP of <300 pg/ml effectively rules out acute congestive heart failure with 99% negative predictive value.    Troponin [[id number]]  (Normal) Collected:  09/02/19 1050    Specimen:  Blood Updated:  09/02/19 1131     Troponin T <0.010 ng/mL     Narrative:       Troponin T Reference Range:  <= 0.03 ng/mL-   Negative for AMI  >0.03 ng/mL-     Abnormal for myocardial necrosis.  Clinicians would have to utilize clinical acumen, EKG, Troponin and serial changes to determine if it is an Acute Myocardial Infarction or myocardial injury due to an underlying chronic condition.     CBC Auto Differential [[id number]]  (Abnormal) Collected:  09/02/19 1050    Specimen:  Blood Updated:  09/02/19 1110     WBC 12.92 10*3/mm3      RBC 4.92 10*6/mm3       Hemoglobin 15.0 g/dL      Hematocrit 47.6 %      MCV 96.7 fL      MCH 30.5 pg      MCHC 31.5 g/dL      RDW 13.7 %      RDW-SD 49.5 fl      MPV 11.4 fL      Platelets 201 10*3/mm3      Neutrophil % 70.6 %      Lymphocyte % 17.4 %      Monocyte % 7.9 %      Eosinophil % 3.1 %      Basophil % 0.6 %      Immature Grans % 0.4 %      Neutrophils, Absolute 9.12 10*3/mm3      Lymphocytes, Absolute 2.25 10*3/mm3      Monocytes, Absolute 1.02 10*3/mm3      Eosinophils, Absolute 0.40 10*3/mm3      Basophils, Absolute 0.08 10*3/mm3      Immature Grans, Absolute 0.05 10*3/mm3      nRBC 0.0 /100 WBC           I ordered the above labs and reviewed the results    RADIOLOGY  XR Chest 2 View   Final Result       NAD  I ordered the above noted radiological studies. Interpreted by radiologist. Reviewed by me in PACS.       PROCEDURES  Procedures     EKG         EKG time: 1104   Rhythm/Rate: sinus rhythm, rate: NSR, 70  Normal P waves and normal DC interval   Normal QRS, Normal axis  Normal ST and T waves    Interpreted Contemporaneously by me, independently viewed  unchanged compared to prior 10/18/17      PROGRESS AND CONSULTS     1041  Labs and CXR ordered for evaluation.    Given patients age and report of malaise, will treat with antibiotics to prevent further advancement of this infection to pneumonia.    Pt ambulates in the ED with sats 97% on RA per RN Elina    1253  Rechecked the patient. BP- (!) 185/94 HR- 71 Temp- 99.8 °F (37.7 °C) (Tympanic) O2 sat- 100% Informed patient of all labs and imaging, including no pneumonia seen. Discussed the plan for discharge. Will ambulate the patient prior to discharge. Pt understands and agrees with the plan, all questions answered.    MEDICAL DECISION MAKING  Results were reviewed/discussed with the patient and they were also made aware of online access. Pt also made aware that some labs, such as cultures, will not be resulted during ER visit and follow up with PMD is necessary.      MDM  Number of Diagnoses or Management Options  Acute bronchitis, unspecified organism:   Dyspnea, unspecified type:   Hypertension, unspecified type:      Amount and/or Complexity of Data Reviewed  Clinical lab tests: ordered and reviewed  Tests in the medicine section of CPT®: ordered and reviewed (See procedure note for EKG.)  Decide to obtain previous medical records or to obtain history from someone other than the patient: yes  Review and summarize past medical records: yes    Patient Progress  Patient progress: stable         DIAGNOSIS  Final diagnoses:   Acute bronchitis, unspecified organism   Hypertension, unspecified type   Dyspnea, unspecified type       DISPOSITION  DISCHARGE    Patient discharged in stable condition.    Reviewed implications of results, diagnosis, meds, responsibility to follow up, warning signs and symptoms of possible worsening, potential complications and reasons to return to ER, including any new or worsening symptoms.    Patient/Family voiced understanding of above instructions.    Discussed plan for discharge, as there is no emergent indication for admission. Patient referred to primary care provider for BP management due to today's BP. Pt/family is agreeable and understands need for follow up and repeat testing.  Pt is aware that discharge does not mean that nothing is wrong but it indicates no emergency is present that requires admission and they must continue care with follow-up as given below or physician of their choice.     FOLLOW-UP  Yuki Pennington, VALENTE  [id number] [facility].[address] [place] [address]  [phone]      As needed         Medication List      New Prescriptions    doxycycline 100 MG capsule  Commonly known as:  VIBRAMYCIN  Take 1 capsule by mouth 2 (Two) Times a Day for 7 days.              Latest Documented Vital Signs:  As of 1:42 PM  BP- 157/78 HR- 71 Temp- 99.8 °F (37.7 °C) (Tympanic) O2 sat- 100%    --  Documentation assistance provided by kamila  Jaye Bryan for Dr. Lorraine MD.  Information recorded by the scribe was done at my direction and has been verified and validated by me.                Jaye Bryan  09/02/19 1342       Juju Peters MD  09/03/19 1206

## 2019-09-02 NOTE — DISCHARGE INSTRUCTIONS
You are advised to follow closely with Dr. Pennington in 2-3 days for recheck, blood pressure recheck, final results of lab work and imaging testing, and further testing/treatment as needed.    Drink plenty of fluids-at least 8 glasses daily.  Low-sodium diet.  Take probiotics during and after antibiotic use.    Please return to the emergency department immediately with chest pain different than usual for you, persistent or worsening shortness of air, abdominal pain, persistent vomiting/fever, blood in emesis or stool, lightheadedness/fainting, problems with speech, one sided weakness/numbness, new incontinence, problems with vision,  or for worsening of symptoms or other concerns.

## 2019-09-02 NOTE — ED NOTES
"Pt states \"I think I've got pneumonia, I've had a terrible cough and I there's a lot of rattling in my lungs. I feel like I can hardly breathe.\"     Patricia Baez, RN  09/02/19 1037    "

## 2019-09-09 ENCOUNTER — OFFICE VISIT (OUTPATIENT)
Dept: FAMILY MEDICINE CLINIC | Facility: CLINIC | Age: 74
End: 2019-09-09

## 2019-09-09 VITALS
BODY MASS INDEX: 30.05 KG/M2 | HEART RATE: 74 BPM | TEMPERATURE: 98 F | OXYGEN SATURATION: 94 % | WEIGHT: 176 LBS | HEIGHT: 64 IN | DIASTOLIC BLOOD PRESSURE: 72 MMHG | SYSTOLIC BLOOD PRESSURE: 110 MMHG | RESPIRATION RATE: 16 BRPM

## 2019-09-09 DIAGNOSIS — J20.9 ACUTE BRONCHITIS DUE TO INFECTION: Primary | ICD-10-CM

## 2019-09-09 DIAGNOSIS — Z09 HOSPITAL DISCHARGE FOLLOW-UP: ICD-10-CM

## 2019-09-09 PROCEDURE — 99213 OFFICE O/P EST LOW 20 MIN: CPT | Performed by: PHYSICIAN ASSISTANT

## 2019-09-09 PROCEDURE — G0439 PPPS, SUBSEQ VISIT: HCPCS | Performed by: PHYSICIAN ASSISTANT

## 2019-09-09 RX ORDER — DOXYCYCLINE HYCLATE 100 MG/1
100 CAPSULE ORAL 2 TIMES DAILY
Qty: 14 CAPSULE | Refills: 0 | Status: SHIPPED | OUTPATIENT
Start: 2019-09-09 | End: 2019-09-16

## 2019-09-09 NOTE — PROGRESS NOTES
Subjective   Miguelina Mueller is a 74 y.o. female.     History of Present Illness   Miguelina Mueller 74 y.o. female presents today for Emergency Room follow up.  she was treated 9-2-19 for acute bronchitis  .  I reviewed all of the labs and diagnostic testing and noted:  CXR neg  The patient's medications were not changed:  Current outpatient and discharge medications have been reconciled for the patient.  Reviewed by: Yuki Pennington PA-C    she does not have a follow up appointment with a specialist:     Lab Results   Component Value Date    WBC 12.92 (H) 09/02/2019    HGB 15.0 09/02/2019    HCT 47.6 (H) 09/02/2019    MCV 96.7 09/02/2019     09/02/2019     I do have f/u CBC already ordered 10-1-10  She is still coughing and did improve on Doxy;  Only given 7 days--I will add 7 more;  Also bp today is normal.  EKG unchanged; CXR neg    Will try to avoid adding oral pred d/t hx PAF  The following portions of the patient's history were reviewed and updated as appropriate: allergies, current medications, past family history, past medical history, past social history, past surgical history and problem list.    Review of Systems   Constitutional: Positive for fatigue. Negative for activity change, appetite change and unexpected weight change.   HENT: Positive for congestion. Negative for nosebleeds and trouble swallowing.    Eyes: Negative for pain and visual disturbance.   Respiratory: Positive for cough. Negative for chest tightness, shortness of breath and wheezing.    Cardiovascular: Negative for chest pain and palpitations.   Gastrointestinal: Negative for abdominal pain and blood in stool.   Endocrine: Negative.    Genitourinary: Negative for difficulty urinating and hematuria.   Musculoskeletal: Positive for arthralgias, back pain, myalgias and neck pain. Negative for joint swelling.   Skin: Negative for color change and rash.   Allergic/Immunologic: Negative.    Neurological: Negative for syncope and speech  difficulty.   Hematological: Negative for adenopathy.   Psychiatric/Behavioral: Positive for dysphoric mood and sleep disturbance. Negative for agitation and confusion.   All other systems reviewed and are negative.      Objective   Physical Exam   Constitutional: She is oriented to person, place, and time. She appears well-developed and well-nourished.  Non-toxic appearance. No distress.   HENT:   Head: Normocephalic and atraumatic. Hair is normal.   Right Ear: External ear normal. No drainage, swelling or tenderness. Tympanic membrane is retracted.   Left Ear: External ear normal. No drainage, swelling or tenderness. Tympanic membrane is retracted.   Nose: Mucosal edema present. No epistaxis.   Mouth/Throat: Uvula is midline and mucous membranes are normal. No oral lesions. No uvula swelling. Posterior oropharyngeal erythema present. No oropharyngeal exudate.   Eyes: Conjunctivae and EOM are normal. Pupils are equal, round, and reactive to light. Right eye exhibits no discharge. Left eye exhibits no discharge. No scleral icterus.   Neck: Normal range of motion. Neck supple.   Cardiovascular: Normal rate, regular rhythm and normal heart sounds. Exam reveals no gallop.   No murmur heard.  Pulmonary/Chest: Effort normal. No stridor (had few wheezes then cleared). No respiratory distress. She has wheezes. She has no rales. She exhibits no tenderness.   Abdominal: Soft. There is no tenderness.   Lymphadenopathy:     She has no cervical adenopathy.   Neurological: She is alert and oriented to person, place, and time. She exhibits normal muscle tone.   Skin: Skin is warm and dry. No rash noted. She is not diaphoretic.   Psychiatric: She has a normal mood and affect. Her behavior is normal. Judgment and thought content normal.   Nursing note and vitals reviewed.      Assessment/Plan   Miguelina was seen today for hypertension and bronchitis.    Diagnoses and all orders for this visit:    Acute bronchitis due to  infection    Hospital discharge follow-up  Comments:  8-2-19 [facility] ER cough    Other orders  -     doxycycline (VIBRAMYCIN) 100 MG capsule; Take 1 capsule by mouth 2 (Two) Times a Day for 7 days. For infection      bp fine today  Ask Dr Dover if should be on ASA?  7 more days Doxy and helped some

## 2019-09-09 NOTE — PATIENT INSTRUCTIONS
Acute Bronchitis, Adult    Acute bronchitis is sudden (acute) swelling of the air tubes (bronchi) in the lungs. Acute bronchitis causes these tubes to fill with mucus, which can make it hard to breathe. It can also cause coughing or wheezing.  In adults, acute bronchitis usually goes away within 2 weeks. A cough caused by bronchitis may last up to 3 weeks. Smoking, allergies, and asthma can make the condition worse. Repeated episodes of bronchitis may cause further lung problems, such as chronic obstructive pulmonary disease (COPD).  What are the causes?  This condition can be caused by germs and by substances that irritate the lungs, including:  · Cold and flu viruses. This condition is most often caused by the same virus that causes a cold.  · Bacteria.  · Exposure to tobacco smoke, dust, fumes, and air pollution.  What increases the risk?  This condition is more likely to develop in people who:  · Have close contact with someone with acute bronchitis.  · Are exposed to lung irritants, such as tobacco smoke, dust, fumes, and vapors.  · Have a weak immune system.  · Have a respiratory condition such as asthma.  What are the signs or symptoms?  Symptoms of this condition include:  · A cough.  · Coughing up clear, yellow, or green mucus.  · Wheezing.  · Chest congestion.  · Shortness of breath.  · A fever.  · Body aches.  · Chills.  · A sore throat.  How is this diagnosed?  This condition is usually diagnosed with a physical exam. During the exam, your health care provider may order tests, such as chest X-rays, to rule out other conditions. He or she may also:  · Test a sample of your mucus for bacterial infection.  · Check the level of oxygen in your blood. This is done to check for pneumonia.  · Do a chest X-ray or lung function testing to rule out pneumonia and other conditions.  · Perform blood tests.  Your health care provider will also ask about your symptoms and medical history.  How is this treated?  Most  cases of acute bronchitis clear up over time without treatment. Your health care provider may recommend:  · Drinking more fluids. Drinking more makes your mucus thinner, which may make it easier to breathe.  · Taking a medicine for a fever or cough.  · Taking an antibiotic medicine.  · Using an inhaler to help improve shortness of breath and to control a cough.  · Using a cool mist vaporizer or humidifier to make it easier to breathe.  Follow these instructions at home:  Medicines  · Take over-the-counter and prescription medicines only as told by your health care provider.  · If you were prescribed an antibiotic, take it as told by your health care provider. Do not stop taking the antibiotic even if you start to feel better.  General instructions    · Get plenty of rest.  · Drink enough fluids to keep your urine pale yellow.  · Avoid smoking and secondhand smoke. Exposure to cigarette smoke or irritating chemicals will make bronchitis worse. If you smoke and you need help quitting, ask your health care provider. Quitting smoking will help your lungs heal faster.  · Use an inhaler, cool mist vaporizer, or humidifier as told by your health care provider.  · Keep all follow-up visits as told by your health care provider. This is important.  How is this prevented?  To lower your risk of getting this condition again:  · Wash your hands often with soap and water. If soap and water are not available, use hand .  · Avoid contact with people who have cold symptoms.  · Try not to touch your hands to your mouth, nose, or eyes.  · Make sure to get the flu shot every year.  Contact a health care provider if:  · Your symptoms do not improve in 2 weeks of treatment.  Get help right away if:  · You cough up blood.  · You have chest pain.  · You have severe shortness of breath.  · You become dehydrated.  · You faint or keep feeling like you are going to faint.  · You keep vomiting.  · You have a severe headache.  · Your  fever or chills gets worse.  This information is not intended to replace advice given to you by your health care provider. Make sure you discuss any questions you have with your health care provider.  Document Released: 01/25/2006 Document Revised: 08/01/2018 Document Reviewed: 06/07/2017  [employer]vier Interactive Patient Education © 2019 [employer]

## 2019-09-09 NOTE — PROGRESS NOTES
The ABCs of the Annual Wellness Visit  Subsequent Medicare Wellness Visit    Chief Complaint   Patient presents with   • Hypertension   • Bronchitis       Subjective   History of Present Illness:  Miguelina Mueller is a 74 y.o. female who presents for a Subsequent Medicare Wellness Visit.    HEALTH RISK ASSESSMENT    Recent Hospitalizations:  Recently treated at the following:  Other: [facility] ER    Current Medical Providers:  Patient Care Team:  Yuki Pennington PA-C as PCP - General  Yuki Pennington PA-C as PCP - Family Medicine  Yuki Pennington PA-C as PCP - Claims [facility]  Rajesh Dover MD as Consulting Physician (Cardiology)    Smoking Status:  Social History     Tobacco Use   Smoking Status Former Smoker   • Packs/day: 0.25   Smokeless Tobacco Never Used   Tobacco Comment    quit around 2007       Alcohol Consumption:  Social History     Substance and Sexual Activity   Alcohol Use Yes    Comment: rare    ///     caffeine use       Depression Screen:   PHQ-2/PHQ-9 Depression Screening 9/9/2019   Little interest or pleasure in doing things 0   Feeling down, depressed, or hopeless 0   Total Score 0       Fall Risk Screen:  ARIANE Fall Risk Assessment was completed, and patient is at HIGH risk for falls. Assessment completed on:9/9/2019    Health Habits and Functional and Cognitive Screening:  Functional & Cognitive Status 9/9/2019   Do you have difficulty preparing food and eating? No   Do you have difficulty bathing yourself, getting dressed or grooming yourself? No   Do you have difficulty using the toilet? No   Do you have difficulty moving around from place to place? No   Do you have trouble with steps or getting out of a bed or a chair? No   Current Diet Well Balanced Diet   Dental Exam Up to date   Eye Exam Up to date   Exercise (times per week) 0 times per week   Current Exercise Activities Include None   Do you need help using the phone?  No   Are you deaf or do you have serious difficulty hearing?  No   Do  you need help with transportation? No   Do you need help shopping? No   Do you need help preparing meals?  No   Do you need help with housework?  No   Do you need help with laundry? No   Do you need help taking your medications? No   Do you need help managing money? No   Do you ever drive or ride in a car without wearing a seat belt? No   Have you felt unusual stress, anger or loneliness in the last month? No   Who do you live with? Spouse   If you need help, do you have trouble finding someone available to you? No   Have you been bothered in the last four weeks by sexual problems? No   Do you have difficulty concentrating, remembering or making decisions? No         Does the patient have evidence of cognitive impairment? No    Asprin use counseling:not taking ASA and will ask cardio if should be    Age-appropriate Screening Schedule:  Refer to the list below for future screening recommendations based on patient's age, sex and/or medical conditions. Orders for these recommended tests are listed in the plan section. The patient has been provided with a written plan.    Health Maintenance   Topic Date Due   • DXA SCAN  07/19/2018   • INFLUENZA VACCINE  08/01/2019   • ZOSTER VACCINE (2 of 2) 05/31/2020 (Originally 3/3/2017)   • MAMMOGRAM  09/13/2019   • LIPID PANEL  07/20/2020   • COLONOSCOPY  02/09/2021   • TDAP/TD VACCINES (2 - Td) 10/12/2026   • PNEUMOCOCCAL VACCINES (65+ LOW/MEDIUM RISK)  Completed          The following portions of the patient's history were reviewed and updated as appropriate: allergies, current medications, past family history, past medical history, past social history, past surgical history and problem list.    Outpatient Medications Prior to Visit   Medication Sig Dispense Refill   • amiodarone (PACERONE) 200 MG tablet TAKE ONE TABLET BY MOUTH DAILY 90 tablet 0   • Cyanocobalamin (VITAMIN B-12) 1000 MCG sublingual tablet Place 1,000 mcg under the tongue Daily. One SL daily 90 each 3   • D3  "SUPER STRENGTH 2000 units capsule TAKE ONE CAPSULE BY MOUTH DAILY 90 capsule 3   • DULoxetine (CYMBALTA) 60 MG capsule Take 1 capsule by mouth Daily. For mood and fibro pain 30 capsule 11   • folic acid (FOLVITE) 1 MG tablet Take 1 tablet by mouth Daily. 90 tablet 1   • pravastatin (PRAVACHOL) 80 MG tablet Take 1 tablet by mouth Daily. For cholesterol 30 tablet 11   • traMADol (ULTRAM) 50 MG tablet Take 1 tablet by mouth Every 6 (Six) Hours As Needed (pain). 120 tablet 2   • doxycycline (VIBRAMYCIN) 100 MG capsule Take 1 capsule by mouth 2 (Two) Times a Day for 7 days. 14 capsule 0     No facility-administered medications prior to visit.        Patient Active Problem List   Diagnosis   • Anxiety   • Mild episode of recurrent major depressive disorder (CMS/[facility])   • Fibromyalgia   • Hyperlipidemia   • Benign essential hypertension   • IFG (impaired fasting glucose)   • Renal insufficiency   • Shoulder bursitis   • Seasonal allergic rhinitis   • PAF (paroxysmal atrial fibrillation) (CMS/[facility])   • CKD (chronic kidney disease) stage 3, GFR 30-59 ml/min (CMS/[facility])   • Vitamin D deficiency   • B12 deficiency   • Low folic acid       Advanced Care Planning:  Patient does not have an advance directive - not interested in additional information    Review of Systems    Compared to one year ago, the patient feels her physical health is the same.  Compared to one year ago, the patient feels her mental health is the same.    Reviewed chart for potential of high risk medication in the elderly: yes  Reviewed chart for potential of harmful drug interactions in the elderly:yes    Objective         Vitals:    09/09/19 1305   BP: 110/72   BP Location: Left arm   Patient Position: Sitting   Cuff Size: Large Adult   Pulse: 74   Resp: 16   Temp: 98 °F (36.7 °C)   TempSrc: Oral   SpO2: 94%   Weight: 79.8 kg (176 lb)   Height: 162.6 cm (64\")       Body mass index is 30.21 kg/m².  Discussed the patient's BMI with her. The BMI is above average; " BMI management plan is completed.    Physical Exam    Lab Results   Component Value Date    GLU 96 07/20/2019    CHLPL 187 07/20/2019    TRIG 127 07/20/2019    HDL 65 07/20/2019    LDL 97 07/20/2019    VLDL 25 07/20/2019    HGBA1C 5.4 07/20/2019        Assessment/Plan   Medicare Risks and Personalized Health Plan  CMS Preventative Services Quick Reference  Advance Directive Discussion  Immunizations Discussed/Encouraged (specific immunizations; Shingrix )  mammo and DEXA    The above risks/problems have been discussed with the patient.  Pertinent information has been shared with the patient in the After Visit Summary.  Follow up plans and orders are seen below in the Assessment/Plan Section.    There are no diagnoses linked to this encounter.  Follow Up:  No Follow-up on file.     An After Visit Summary and PPPS were given to the patient.

## 2019-10-01 ENCOUNTER — RESULTS ENCOUNTER (OUTPATIENT)
Dept: FAMILY MEDICINE CLINIC | Facility: CLINIC | Age: 74
End: 2019-10-01

## 2019-10-01 DIAGNOSIS — E53.8 LOW FOLIC ACID: ICD-10-CM

## 2019-10-01 DIAGNOSIS — E78.2 MIXED HYPERLIPIDEMIA: ICD-10-CM

## 2019-10-01 DIAGNOSIS — F41.9 ANXIETY: ICD-10-CM

## 2019-10-01 DIAGNOSIS — E55.9 VITAMIN D DEFICIENCY: ICD-10-CM

## 2019-10-01 DIAGNOSIS — F33.0 MILD EPISODE OF RECURRENT MAJOR DEPRESSIVE DISORDER (HCC): ICD-10-CM

## 2019-10-01 DIAGNOSIS — N18.30 CKD (CHRONIC KIDNEY DISEASE) STAGE 3, GFR 30-59 ML/MIN (HCC): ICD-10-CM

## 2019-10-01 DIAGNOSIS — R79.89 LFT ELEVATION: ICD-10-CM

## 2019-10-01 DIAGNOSIS — E53.8 B12 DEFICIENCY: ICD-10-CM

## 2019-10-01 DIAGNOSIS — M79.7 FIBROMYALGIA: ICD-10-CM

## 2019-10-18 DIAGNOSIS — M79.7 FIBROMYALGIA: ICD-10-CM

## 2019-10-21 DIAGNOSIS — M79.7 FIBROMYALGIA: ICD-10-CM

## 2019-10-21 RX ORDER — TRAMADOL HYDROCHLORIDE 50 MG/1
TABLET ORAL
Qty: 120 TABLET | OUTPATIENT
Start: 2019-10-21

## 2019-10-21 RX ORDER — TRAMADOL HYDROCHLORIDE 50 MG/1
50 TABLET ORAL EVERY 6 HOURS PRN
Qty: 120 TABLET | Refills: 2 | Status: SHIPPED | OUTPATIENT
Start: 2019-10-21 | End: 2019-11-13 | Stop reason: SDUPTHER

## 2019-10-24 RX ORDER — AMIODARONE HYDROCHLORIDE 200 MG/1
TABLET ORAL
Qty: 90 TABLET | Refills: 0 | Status: SHIPPED | OUTPATIENT
Start: 2019-10-24 | End: 2020-02-05

## 2019-10-24 RX ORDER — FOLIC ACID 1 MG/1
TABLET ORAL
Qty: 90 TABLET | Refills: 1 | Status: SHIPPED | OUTPATIENT
Start: 2019-10-24 | End: 2020-02-06 | Stop reason: SDUPTHER

## 2019-11-13 ENCOUNTER — OFFICE VISIT (OUTPATIENT)
Dept: FAMILY MEDICINE CLINIC | Facility: CLINIC | Age: 74
End: 2019-11-13

## 2019-11-13 VITALS
HEIGHT: 64 IN | WEIGHT: 176 LBS | OXYGEN SATURATION: 94 % | DIASTOLIC BLOOD PRESSURE: 72 MMHG | SYSTOLIC BLOOD PRESSURE: 110 MMHG | TEMPERATURE: 98.4 F | HEART RATE: 77 BPM | RESPIRATION RATE: 16 BRPM | BODY MASS INDEX: 30.05 KG/M2

## 2019-11-13 DIAGNOSIS — M79.7 FIBROMYALGIA: ICD-10-CM

## 2019-11-13 DIAGNOSIS — F41.9 ANXIETY: ICD-10-CM

## 2019-11-13 DIAGNOSIS — J06.9 ACUTE URI: ICD-10-CM

## 2019-11-13 DIAGNOSIS — E53.8 LOW FOLIC ACID: ICD-10-CM

## 2019-11-13 DIAGNOSIS — N18.30 CKD (CHRONIC KIDNEY DISEASE) STAGE 3, GFR 30-59 ML/MIN (HCC): ICD-10-CM

## 2019-11-13 DIAGNOSIS — J20.9 ACUTE BRONCHITIS DUE TO INFECTION: Primary | ICD-10-CM

## 2019-11-13 DIAGNOSIS — N28.9 RENAL INSUFFICIENCY: ICD-10-CM

## 2019-11-13 DIAGNOSIS — F33.0 MILD EPISODE OF RECURRENT MAJOR DEPRESSIVE DISORDER (HCC): ICD-10-CM

## 2019-11-13 DIAGNOSIS — E53.8 B12 DEFICIENCY: ICD-10-CM

## 2019-11-13 DIAGNOSIS — E78.2 MIXED HYPERLIPIDEMIA: ICD-10-CM

## 2019-11-13 PROCEDURE — 99214 OFFICE O/P EST MOD 30 MIN: CPT | Performed by: PHYSICIAN ASSISTANT

## 2019-11-13 RX ORDER — AMOXICILLIN AND CLAVULANATE POTASSIUM 875; 125 MG/1; MG/1
1 TABLET, FILM COATED ORAL EVERY 12 HOURS SCHEDULED
Qty: 20 TABLET | Refills: 0 | Status: SHIPPED | OUTPATIENT
Start: 2019-11-13 | End: 2020-02-06

## 2019-11-13 RX ORDER — PREDNISONE 10 MG/1
10 TABLET ORAL 2 TIMES DAILY
Qty: 10 TABLET | Refills: 0 | Status: SHIPPED | OUTPATIENT
Start: 2019-11-13 | End: 2020-02-06

## 2019-11-13 RX ORDER — TRAMADOL HYDROCHLORIDE 50 MG/1
50 TABLET ORAL EVERY 6 HOURS PRN
Qty: 120 TABLET | Refills: 2 | OUTPATIENT
Start: 2019-11-13

## 2019-11-13 RX ORDER — TRAMADOL HYDROCHLORIDE 50 MG/1
50 TABLET ORAL EVERY 6 HOURS PRN
Qty: 120 TABLET | Refills: 2 | Status: SHIPPED | OUTPATIENT
Start: 2019-11-13 | End: 2019-11-13

## 2019-11-13 RX ORDER — TRAMADOL HYDROCHLORIDE 50 MG/1
50 TABLET ORAL EVERY 6 HOURS PRN
Qty: 120 TABLET | Refills: 2 | Status: CANCELLED | OUTPATIENT
Start: 2019-11-13

## 2019-11-13 NOTE — PROGRESS NOTES
Subjective   Miguelina Mueller is a 74 y.o. female.     History of Present Illness   She is on Rx for A Fib from cardio; she is on statin    Miguelina Mueller 74 y.o. female who presents for evaluation of upper respiratory congestion, cough, fatigue. Symptoms include congestion, post nasal drip, cough described as productive of clear sputum and wheezing.  Onset of symptoms was 10 days ago, unchanged since that time. Patient denies fever.   Evaluation to date: none Treatment to date:  Mucinex.     Miguelina Mueller female 73 y.o. who presents today for follow up of Depression and Anxiety.  She reports medication does help and this is her best med and tried several. Onset of symptoms was approximately several years ago.  She denies current suicidal and homicidal ideation. Risk factors are chronic illness and chronic pain.  Previous treatment includes current Rx.  She complains of the following medication side effects: none.  The patient declines to go to counseling..  She remains on Ultram for Fibro pain and does help and tolerates it well.  The patient has read and signed the [facility] Controlled Substance Contract.  I will continue to see patient for regular follow up appointments.  They are well controlled on their medication.  NATA has been reviewed by me and is updated every 3 months. The patient is aware of the potential for addiction and dependence. She remains on Ultram for Fibro pain and does help and tolerates it well.    Wants to wait on mammo and DEXA  I am watching renal functions; avoiding NSAIDs    She needs f/u labs for the B12 and folate; also D and renal functions  The following portions of the patient's history were reviewed and updated as appropriate: allergies, current medications, past family history, past medical history, past social history, past surgical history and problem list.    Review of Systems   Constitutional: Positive for fatigue. Negative for activity change and unexpected weight change.    HENT: Positive for congestion and postnasal drip. Negative for ear pain and sore throat.    Eyes: Negative for pain and discharge.   Respiratory: Positive for cough. Negative for chest tightness, shortness of breath and wheezing.    Cardiovascular: Negative for chest pain and palpitations.   Gastrointestinal: Negative for abdominal pain, diarrhea and vomiting.   Endocrine: Negative.    Genitourinary: Negative.    Musculoskeletal: Positive for arthralgias and myalgias. Negative for joint swelling.   Skin: Negative for color change, rash and wound.   Allergic/Immunologic: Negative.    Neurological: Negative for seizures and syncope.   Psychiatric/Behavioral: Positive for sleep disturbance.       Objective   Physical Exam   Constitutional: She is oriented to person, place, and time. She appears well-developed and well-nourished.  Non-toxic appearance. No distress.   HENT:   Head: Normocephalic and atraumatic. Hair is normal.   Right Ear: External ear normal. No drainage, swelling or tenderness. Tympanic membrane is retracted.   Left Ear: External ear normal. No drainage, swelling or tenderness. Tympanic membrane is retracted.   Nose: Mucosal edema present. No epistaxis.   Mouth/Throat: Uvula is midline and mucous membranes are normal. No oral lesions. No uvula swelling. Posterior oropharyngeal erythema present. No oropharyngeal exudate.   Eyes: Conjunctivae and EOM are normal. Pupils are equal, round, and reactive to light. Right eye exhibits no discharge. Left eye exhibits no discharge. No scleral icterus.   Neck: Normal range of motion. Neck supple.   Cardiovascular: Normal rate, regular rhythm and normal heart sounds. Exam reveals no gallop.   No murmur heard.  Pulmonary/Chest: No stridor. No respiratory distress. She has wheezes. She has no rales. She exhibits no tenderness.   Abdominal: There is no tenderness.   Lymphadenopathy:     She has no cervical adenopathy.   Neurological: She is alert and oriented to  person, place, and time. She exhibits normal muscle tone.   Skin: Skin is warm and dry. No rash noted. She is not diaphoretic.   Psychiatric: She has a normal mood and affect. Her behavior is normal. Judgment and thought content normal.   Nursing note and vitals reviewed.      Assessment/Plan   Miguelina was seen today for fatigue.    Diagnoses and all orders for this visit:    Acute bronchitis due to infection    Acute URI    Mild episode of recurrent major depressive disorder (CMS/[facility])    CKD (chronic kidney disease) stage 3, GFR 30-59 ml/min (CMS/[facility])    Anxiety    Renal insufficiency    Fibromyalgia    B12 deficiency    Low folic acid    Mixed hyperlipidemia    Other orders  -     amoxicillin-clavulanate (AUGMENTIN) 875-125 MG per tablet; Take 1 tablet by mouth Every 12 (Twelve) Hours. One PO BID for infection with food  -     predniSONE (DELTASONE) 10 MG tablet; Take 1 tablet by mouth 2 (Two) Times a Day. For acute bronchitis      Watching renal labs;  Will follow B12 and folic acid  Stay on statin  Cymbalta helps depression some  Ultram helps fibro pain some  Sees cardio for a fib  Low dose oral pred a few days; watch a fib

## 2019-11-13 NOTE — PATIENT INSTRUCTIONS

## 2020-01-11 DIAGNOSIS — M79.7 FIBROMYALGIA: ICD-10-CM

## 2020-01-13 RX ORDER — TRAMADOL HYDROCHLORIDE 50 MG/1
TABLET ORAL
Qty: 120 TABLET | OUTPATIENT
Start: 2020-01-13

## 2020-02-05 RX ORDER — AMIODARONE HYDROCHLORIDE 200 MG/1
TABLET ORAL
Qty: 90 TABLET | Refills: 0 | Status: SHIPPED | OUTPATIENT
Start: 2020-02-05 | End: 2020-05-06

## 2020-02-06 ENCOUNTER — OFFICE VISIT (OUTPATIENT)
Dept: FAMILY MEDICINE CLINIC | Facility: CLINIC | Age: 75
End: 2020-02-06

## 2020-02-06 VITALS
RESPIRATION RATE: 16 BRPM | SYSTOLIC BLOOD PRESSURE: 130 MMHG | HEIGHT: 64 IN | OXYGEN SATURATION: 97 % | WEIGHT: 180 LBS | TEMPERATURE: 97.9 F | BODY MASS INDEX: 30.73 KG/M2 | HEART RATE: 74 BPM | DIASTOLIC BLOOD PRESSURE: 72 MMHG

## 2020-02-06 DIAGNOSIS — M79.7 FIBROMYALGIA: ICD-10-CM

## 2020-02-06 DIAGNOSIS — J01.90 ACUTE SINUSITIS, RECURRENCE NOT SPECIFIED, UNSPECIFIED LOCATION: ICD-10-CM

## 2020-02-06 DIAGNOSIS — E78.2 MIXED HYPERLIPIDEMIA: ICD-10-CM

## 2020-02-06 DIAGNOSIS — F41.9 ANXIETY: ICD-10-CM

## 2020-02-06 DIAGNOSIS — E53.8 LOW FOLIC ACID: ICD-10-CM

## 2020-02-06 DIAGNOSIS — R05.9 COUGH: ICD-10-CM

## 2020-02-06 DIAGNOSIS — Z23 IMMUNIZATION DUE: Primary | ICD-10-CM

## 2020-02-06 DIAGNOSIS — F33.0 MILD EPISODE OF RECURRENT MAJOR DEPRESSIVE DISORDER (HCC): ICD-10-CM

## 2020-02-06 DIAGNOSIS — N18.30 CKD (CHRONIC KIDNEY DISEASE) STAGE 3, GFR 30-59 ML/MIN (HCC): ICD-10-CM

## 2020-02-06 DIAGNOSIS — E53.8 B12 DEFICIENCY: ICD-10-CM

## 2020-02-06 DIAGNOSIS — E55.9 VITAMIN D DEFICIENCY: ICD-10-CM

## 2020-02-06 DIAGNOSIS — I48.0 PAF (PAROXYSMAL ATRIAL FIBRILLATION) (HCC): ICD-10-CM

## 2020-02-06 PROCEDURE — 99214 OFFICE O/P EST MOD 30 MIN: CPT | Performed by: PHYSICIAN ASSISTANT

## 2020-02-06 PROCEDURE — 90653 IIV ADJUVANT VACCINE IM: CPT | Performed by: PHYSICIAN ASSISTANT

## 2020-02-06 PROCEDURE — G0008 ADMIN INFLUENZA VIRUS VAC: HCPCS | Performed by: PHYSICIAN ASSISTANT

## 2020-02-06 RX ORDER — TRAMADOL HYDROCHLORIDE 50 MG/1
50 TABLET ORAL EVERY 6 HOURS PRN
Qty: 120 TABLET | Refills: 2 | Status: SHIPPED | OUTPATIENT
Start: 2020-02-06 | End: 2020-05-12 | Stop reason: SDUPTHER

## 2020-02-06 RX ORDER — PRAVASTATIN SODIUM 80 MG/1
80 TABLET ORAL DAILY
Qty: 30 TABLET | Refills: 11 | Status: SHIPPED | OUTPATIENT
Start: 2020-02-06 | End: 2021-01-25 | Stop reason: SDUPTHER

## 2020-02-06 RX ORDER — AMOXICILLIN AND CLAVULANATE POTASSIUM 875; 125 MG/1; MG/1
1 TABLET, FILM COATED ORAL EVERY 12 HOURS SCHEDULED
Qty: 20 TABLET | Refills: 0 | Status: SHIPPED | OUTPATIENT
Start: 2020-02-06 | End: 2020-04-27 | Stop reason: ALTCHOICE

## 2020-02-06 RX ORDER — FOLIC ACID 1 MG/1
1000 TABLET ORAL DAILY
Qty: 90 TABLET | Refills: 3 | Status: SHIPPED | OUTPATIENT
Start: 2020-02-06 | End: 2020-08-05 | Stop reason: SDUPTHER

## 2020-02-06 RX ORDER — DULOXETIN HYDROCHLORIDE 60 MG/1
60 CAPSULE, DELAYED RELEASE ORAL DAILY
Qty: 30 CAPSULE | Refills: 11 | Status: SHIPPED | OUTPATIENT
Start: 2020-02-06 | End: 2021-01-25 | Stop reason: SDUPTHER

## 2020-02-06 RX ORDER — TRAMADOL HYDROCHLORIDE 50 MG/1
50 TABLET ORAL EVERY 6 HOURS PRN
COMMUNITY
Start: 2020-01-11 | End: 2020-02-06 | Stop reason: SDUPTHER

## 2020-02-06 RX ORDER — PREDNISONE 10 MG/1
10 TABLET ORAL 2 TIMES DAILY
Qty: 10 TABLET | Refills: 0 | Status: SHIPPED | OUTPATIENT
Start: 2020-02-06 | End: 2020-04-27 | Stop reason: ALTCHOICE

## 2020-02-06 NOTE — PROGRESS NOTES
Subjective   Miguelina Mueller is a 74 y.o. female.     History of Present Illness   Miguelina Mueller 74 y.o. female who presents for evaluation of cough. Symptoms include post nasal drip. No new SOA; no wheezing; no sinus;  Onset of symptoms was 3 months ago, unchanged since that time. Patient denies shortness of breath, wheezing, fever.   Evaluation to date: saw me Nov Treatment to date:  Augmentin and pred.   The Augmentin and prednisone and low dose did help and I want a re-round this plus get a chest x-ray to make sure I am not missing anything.  We could also do the asthma allergy work-up if this continues and chest x-ray is negative.    Last sept 2019  Still coughing since Nov  Needs CXR  Still watching renal functions and avoiding NSAIDs  Miguelina Mueller female 73 y.o. who presents today for follow up of Depression and Anxiety.  She reports medication does help and this is her best med and tried several. Onset of symptoms was approximately several years ago.  She denies current suicidal and homicidal ideation. Risk factors are chronic illness and chronic pain.  Previous treatment includes current Rx.  She complains of the following medication side effects: none.  The patient declines to go to counseling..  She remains on Ultram for Fibro pain and does help and tolerates it well.  The patient has read and signed the [facility] Controlled Substance Contract.  I will continue to see patient for regular follow up appointments.  They are well controlled on their medication.  NATA has been reviewed by me and is updated every 3 months. The patient is aware of the potential for addiction and dependence. She remains on Ultram for Fibro pain and does help and tolerates it well.  Wants to wait on mammo and DEXA       Labs are good to be due in July  The following portions of the patient's history were reviewed and updated as appropriate: allergies, current medications, past family history, past medical history, past  social history, past surgical history and problem list.    Review of Systems   Constitutional: Positive for fatigue. Negative for activity change, appetite change and unexpected weight change.   HENT: Positive for postnasal drip. Negative for nosebleeds and trouble swallowing.    Eyes: Negative for pain and visual disturbance.   Respiratory: Positive for cough. Negative for chest tightness, shortness of breath and wheezing.    Cardiovascular: Negative for chest pain and palpitations.   Gastrointestinal: Negative for abdominal pain and blood in stool.   Endocrine: Negative.    Genitourinary: Negative for difficulty urinating and hematuria.   Musculoskeletal: Positive for arthralgias, back pain, myalgias and neck pain. Negative for joint swelling.   Skin: Negative for color change and rash.   Allergic/Immunologic: Negative.    Neurological: Negative for syncope and speech difficulty.   Hematological: Negative for adenopathy.   Psychiatric/Behavioral: Positive for dysphoric mood and sleep disturbance. Negative for agitation and confusion.   All other systems reviewed and are negative.      Objective   Physical Exam   Constitutional: She is oriented to person, place, and time. She appears well-developed and well-nourished.  Non-toxic appearance. No distress.   HENT:   Head: Normocephalic and atraumatic. Hair is normal.   Right Ear: External ear normal. No drainage, swelling or tenderness. Tympanic membrane is retracted.   Left Ear: External ear normal. No drainage, swelling or tenderness. Tympanic membrane is retracted.   Nose: Mucosal edema present. No epistaxis.   Mouth/Throat: Uvula is midline and mucous membranes are normal. No oral lesions. No uvula swelling. Posterior oropharyngeal erythema present. No oropharyngeal exudate.   Ear fluid   Eyes: Pupils are equal, round, and reactive to light. Conjunctivae and EOM are normal. Right eye exhibits no discharge. Left eye exhibits no discharge. No scleral icterus.    Neck: Normal range of motion. Neck supple.   Cardiovascular: Normal rate, regular rhythm and normal heart sounds. Exam reveals no gallop.   No murmur heard.  Trace pedal edema = bilat     Pulmonary/Chest: Breath sounds normal. No stridor. No respiratory distress. She has no wheezes. She has no rales. She exhibits no tenderness.   Abdominal: Soft. There is no tenderness.   Lymphadenopathy:     She has no cervical adenopathy.   Neurological: She is alert and oriented to person, place, and time. She exhibits normal muscle tone.   Skin: Skin is warm and dry. No rash noted. She is not diaphoretic.   Psychiatric: She has a normal mood and affect. Her behavior is normal. Judgment and thought content normal.   Nursing note and vitals reviewed.      Assessment/Plan   Problems Addressed this Visit        Cardiovascular and Mediastinum    Hyperlipidemia    Relevant Medications    pravastatin (PRAVACHOL) 80 MG tablet       Digestive    Vitamin D deficiency    B12 deficiency       Nervous and Auditory    Fibromyalgia       Genitourinary    CKD (chronic kidney disease) stage 3, GFR 30-59 ml/min (CMS/[facility])       Other    Anxiety    Mild episode of recurrent major depressive disorder (CMS/[facility])    Relevant Medications    DULoxetine (CYMBALTA) 60 MG capsule    Low folic acid      Other Visit Diagnoses     Immunization due    -  Primary    Relevant Orders    Fluad Tri 65yr+ (Completed)    Cough        Relevant Orders    XR Chest PA & Lateral    Acute sinusitis, recurrence not specified, unspecified location            CXR  We will continue her pravastatin for mixed hyperlipidemia  She follows up with cardiology for history of paroxysmal atrial fibrillation  Will continue Cymbalta because it helps her anxiety and depression somnolence her best med  Refill tramadol for the fibromyalgia  Continue folic acid and vitamin D  Labs due July  We will retreat with Augmentin and prednisone because she still having a cough and get that chest  x-ray

## 2020-04-07 RX ORDER — TRAMADOL HYDROCHLORIDE 50 MG/1
TABLET ORAL
Qty: 120 TABLET | Refills: 2 | OUTPATIENT
Start: 2020-04-07

## 2020-04-14 ENCOUNTER — TELEPHONE (OUTPATIENT)
Dept: CARDIOLOGY | Facility: CLINIC | Age: 75
End: 2020-04-14

## 2020-04-27 ENCOUNTER — OFFICE VISIT (OUTPATIENT)
Dept: CARDIOLOGY | Facility: CLINIC | Age: 75
End: 2020-04-27

## 2020-04-27 VITALS
HEIGHT: 64 IN | BODY MASS INDEX: 30.73 KG/M2 | SYSTOLIC BLOOD PRESSURE: 140 MMHG | WEIGHT: 180 LBS | DIASTOLIC BLOOD PRESSURE: 84 MMHG

## 2020-04-27 DIAGNOSIS — I48.0 PAF (PAROXYSMAL ATRIAL FIBRILLATION) (HCC): Primary | ICD-10-CM

## 2020-04-27 PROCEDURE — 99441 PR PHYS/QHP TELEPHONE EVALUATION 5-10 MIN: CPT | Performed by: INTERNAL MEDICINE

## 2020-04-27 NOTE — PROGRESS NOTES
Subjective:     Encounter Date:04/27/2020      Patient ID: Miguelina Mueller is a 75 y.o. female.    Chief Complaint:  Atrial Fibrillation   Presents for follow-up visit. Symptoms are negative for chest pain, hypertension and syncope. The symptoms have been stable. Past medical history includes atrial fibrillation.   Hypertension   This is a chronic problem. The problem is controlled. Associated symptoms include malaise/fatigue. Pertinent negatives include no chest pain.       75-year-old female who presents today via telephone visit.  Clinically patient continues to do well except for her fatigue.  She says she is just grown to excepted.  She exercises much as he can she says it really has not changed significantly recently.    Review of Systems   Constitution: Positive for malaise/fatigue.   Cardiovascular: Negative for chest pain and syncope.   All other systems reviewed and are negative.      Procedures       Objective:     Physical Exam  Telephone visit     lab Review:       Assessment:         No diagnosis found.       Plan:       1.  Paroxysmal A. fib stable  2.  Hypertension stable  3.  Fatigue unchanged would see back in 1 year sooner if issues    This patient has consented to a telehealth visit via phone. The visit was scheduled as a phone visit to comply with patient safety concerns in accordance with CDC recommendations.  All vitals recorded within this visit are reported by the patient.  I spent  7minutes in total including but not limited to  direct conversation with this patient.

## 2020-05-05 ENCOUNTER — OFFICE VISIT (OUTPATIENT)
Dept: FAMILY MEDICINE CLINIC | Facility: CLINIC | Age: 75
End: 2020-05-05

## 2020-05-05 VITALS
HEART RATE: 71 BPM | HEIGHT: 64 IN | SYSTOLIC BLOOD PRESSURE: 138 MMHG | DIASTOLIC BLOOD PRESSURE: 85 MMHG | BODY MASS INDEX: 30.9 KG/M2

## 2020-05-05 DIAGNOSIS — E78.2 MIXED HYPERLIPIDEMIA: ICD-10-CM

## 2020-05-05 DIAGNOSIS — I48.0 PAF (PAROXYSMAL ATRIAL FIBRILLATION) (HCC): ICD-10-CM

## 2020-05-05 DIAGNOSIS — N18.30 CKD (CHRONIC KIDNEY DISEASE) STAGE 3, GFR 30-59 ML/MIN (HCC): ICD-10-CM

## 2020-05-05 DIAGNOSIS — R73.01 IFG (IMPAIRED FASTING GLUCOSE): ICD-10-CM

## 2020-05-05 DIAGNOSIS — F41.9 ANXIETY: ICD-10-CM

## 2020-05-05 DIAGNOSIS — M79.7 FIBROMYALGIA: Primary | ICD-10-CM

## 2020-05-05 DIAGNOSIS — E53.8 B12 DEFICIENCY: ICD-10-CM

## 2020-05-05 DIAGNOSIS — I10 BENIGN ESSENTIAL HYPERTENSION: ICD-10-CM

## 2020-05-05 DIAGNOSIS — E53.8 LOW FOLIC ACID: ICD-10-CM

## 2020-05-05 DIAGNOSIS — E55.9 VITAMIN D DEFICIENCY: ICD-10-CM

## 2020-05-05 PROCEDURE — 99442 PR PHYS/QHP TELEPHONE EVALUATION 11-20 MIN: CPT | Performed by: PHYSICIAN ASSISTANT

## 2020-05-05 NOTE — PATIENT INSTRUCTIONS

## 2020-05-05 NOTE — PROGRESS NOTES
"Subjective   Miguelina Mueller is a 75 y.o. female.   This was an audio and video enabled telemedicine encounter.  You have chosen to receive care through a telephone visit. Do you consent to use a telephone visit for your medical care today? Yes  History of Present Illness    Since the last visit, she has overall felt tired.  She has Essential Hypertension and well controlled on current medication, Impaired fasting glucose and will monitor labs to watch for DMII, Hyperlipidemia with goals met with current Rx, Atrial Fibillation and remains under the care of their cardiologist for management and Vitamin D deficiency and will update labs for continued management.  she has been compliant with current medications have reviewed them.  The patient denies medication side effects.  Will refill medications. /85   Pulse 71   Ht 162.6 cm (64\")   LMP  (LMP Unknown)   BMI 30.90 kg/m²     Results for orders placed or performed during the hospital encounter of 09/02/19   Comprehensive Metabolic Panel   Result Value Ref Range    Glucose 104 (H) 65 - 99 mg/dL    BUN 15 8 - 23 mg/dL    Creatinine 1.09 (H) 0.57 - 1.00 mg/dL    Sodium 136 136 - 145 mmol/L    Potassium 4.1 3.5 - 5.2 mmol/L    Chloride 99 98 - 107 mmol/L    CO2 23.6 22.0 - 29.0 mmol/L    Calcium 9.5 8.6 - 10.5 mg/dL    Total Protein 7.0 6.0 - 8.5 g/dL    Albumin 4.10 3.50 - 5.20 g/dL    ALT (SGPT) 15 1 - 33 U/L    AST (SGOT) 20 1 - 32 U/L    Alkaline Phosphatase 115 39 - 117 U/L    Total Bilirubin 0.5 0.2 - 1.2 mg/dL    eGFR Non African Amer 49 (L) >60 mL/min/1.73    Globulin 2.9 gm/dL    A/G Ratio 1.4 g/dL    BUN/Creatinine Ratio 13.8 7.0 - 25.0    Anion Gap 13.4 5.0 - 15.0 mmol/L   BNP   Result Value Ref Range    proBNP 345.8 5.0 - 900.0 pg/mL   Troponin   Result Value Ref Range    Troponin T <0.010 0.000 - 0.030 ng/mL   Protime-INR   Result Value Ref Range    Protime 12.4 11.7 - 14.2 Seconds    INR 0.95 0.90 - 1.10   CBC Auto Differential   Result Value Ref " Range    WBC 12.92 (H) 3.40 - 10.80 10*3/mm3    RBC 4.92 3.77 - 5.28 10*6/mm3    Hemoglobin 15.0 12.0 - 15.9 g/dL    Hematocrit 47.6 (H) 34.0 - 46.6 %    MCV 96.7 79.0 - 97.0 fL    MCH 30.5 26.6 - 33.0 pg    MCHC 31.5 31.5 - 35.7 g/dL    RDW 13.7 12.3 - 15.4 %    RDW-SD 49.5 37.0 - 54.0 fl    MPV 11.4 6.0 - 12.0 fL    Platelets 201 140 - 450 10*3/mm3    Neutrophil % 70.6 42.7 - 76.0 %    Lymphocyte % 17.4 (L) 19.6 - 45.3 %    Monocyte % 7.9 5.0 - 12.0 %    Eosinophil % 3.1 0.3 - 6.2 %    Basophil % 0.6 0.0 - 1.5 %    Immature Grans % 0.4 0.0 - 0.5 %    Neutrophils, Absolute 9.12 (H) 1.70 - 7.00 10*3/mm3    Lymphocytes, Absolute 2.25 0.70 - 3.10 10*3/mm3    Monocytes, Absolute 1.02 (H) 0.10 - 0.90 10*3/mm3    Eosinophils, Absolute 0.40 0.00 - 0.40 10*3/mm3    Basophils, Absolute 0.08 0.00 - 0.20 10*3/mm3    Immature Grans, Absolute 0.05 0.00 - 0.05 10*3/mm3    nRBC 0.0 0.0 - 0.2 /100 WBC   Light Blue Top   Result Value Ref Range    Extra Tube hold for add-on    Green Top (Gel)   Result Value Ref Range    Extra Tube Hold for add-ons.    Lavender Top   Result Value Ref Range    Extra Tube hold for add-on    Gold Top - SST   Result Value Ref Range    Extra Tube Hold for add-ons.      I will update all labs next 1-2 mos; watching renal functions.  Miguelina Mueller female 73 y.o. who presents today for follow up of Depression and Anxiety.  She reports medication does help and this is her best med and tried several. Onset of symptoms was approximately several years ago.  She denies current suicidal and homicidal ideation. Risk factors are chronic illness and chronic pain.  Previous treatment includes current Rx.  She complains of the following medication side effects: none.  The patient declines to go to counseling..  She remains on Ultram for Fibro pain and does help and tolerates it well.  The patient has read and signed the [facility] Controlled Substance Contract.  I will continue to see patient for regular follow up  appointments.  They are well controlled on their medication.  NATA has been reviewed by me and is updated every 3 months. The patient is aware of the potential for addiction and dependence. She remains on Ultram for Fibro pain and does help and tolerates it well.  Wants to wait on mammo and DEXA    Also want a note her cough did resolve  The following portions of the patient's history were reviewed and updated as appropriate: allergies, current medications, past family history, past medical history, past social history, past surgical history and problem list.    Review of Systems   Constitutional: Positive for fatigue. Negative for activity change, appetite change and unexpected weight change.   HENT: Negative for nosebleeds and trouble swallowing.    Eyes: Negative for pain and visual disturbance.   Respiratory: Negative for chest tightness, shortness of breath and wheezing.    Cardiovascular: Negative for chest pain and palpitations.   Gastrointestinal: Negative for abdominal pain and blood in stool.   Endocrine: Negative.    Genitourinary: Negative for difficulty urinating and hematuria.   Musculoskeletal: Positive for arthralgias, back pain, myalgias and neck pain. Negative for joint swelling.   Skin: Negative for color change and rash.   Allergic/Immunologic: Negative.    Neurological: Negative for syncope and speech difficulty.   Hematological: Negative for adenopathy.   Psychiatric/Behavioral: Positive for dysphoric mood and sleep disturbance. Negative for agitation and confusion.   All other systems reviewed and are negative.      Objective   Physical Exam   Constitutional: She appears distressed.   Pulmonary/Chest: Effort normal. No respiratory distress.   Psychiatric: Her behavior is normal. Judgment and thought content normal.       Assessment/Plan   Miguelina was seen today for hypertension.    Diagnoses and all orders for this visit:    Fibromyalgia    IFG (impaired fasting glucose)    Vitamin D  deficiency    Low folic acid    B12 deficiency    CKD (chronic kidney disease) stage 3, GFR 30-59 ml/min (CMS/[facility])    Mixed hyperlipidemia    Anxiety    Benign essential hypertension    PAF (paroxysmal atrial fibrillation) (CMS/[facility])        Time spent was 11 minutes  Will refill Ultram for the fibromyalgia pain working well  Continue Cymbalta for the fibromyalgia pain and anxiety does help  Labs next 1 to 2 months  Continue vitamin D, B12, folic acid, updating labs  Plan, Miguelina Mueller, was seen today.  she was seen for DMII followed by Endocrinologist, Hyperlipidemia and will continue current medication, Atrial Fibrillation and remains under the care of their cardiologist for medical management, Vitamin D deficiency and will update labs  and reviewed medications and checked for renal function adjusted dosing due to patient having renal impairment.

## 2020-05-06 RX ORDER — TRAMADOL HYDROCHLORIDE 50 MG/1
TABLET ORAL
Qty: 120 TABLET | Refills: 2 | OUTPATIENT
Start: 2020-05-06

## 2020-05-06 RX ORDER — AMIODARONE HYDROCHLORIDE 200 MG/1
TABLET ORAL
Qty: 90 TABLET | Refills: 0 | Status: SHIPPED | OUTPATIENT
Start: 2020-05-06 | End: 2020-10-30 | Stop reason: SDUPTHER

## 2020-05-06 RX ORDER — MAGNESIUM 200 MG
TABLET ORAL
Qty: 90 EACH | Refills: 3 | Status: SHIPPED | OUTPATIENT
Start: 2020-05-06 | End: 2021-04-15 | Stop reason: SDUPTHER

## 2020-05-07 RX ORDER — TRAMADOL HYDROCHLORIDE 50 MG/1
TABLET ORAL
Qty: 120 TABLET | Refills: 2 | OUTPATIENT
Start: 2020-05-07

## 2020-05-08 RX ORDER — TRAMADOL HYDROCHLORIDE 50 MG/1
50 TABLET ORAL EVERY 6 HOURS PRN
Qty: 120 TABLET | Refills: 2 | OUTPATIENT
Start: 2020-05-08

## 2020-05-11 RX ORDER — TRAMADOL HYDROCHLORIDE 50 MG/1
50 TABLET ORAL EVERY 6 HOURS PRN
Qty: 120 TABLET | Refills: 2 | OUTPATIENT
Start: 2020-05-11

## 2020-05-12 RX ORDER — TRAMADOL HYDROCHLORIDE 50 MG/1
50 TABLET ORAL EVERY 6 HOURS PRN
Qty: 120 TABLET | Refills: 2 | Status: SHIPPED | OUTPATIENT
Start: 2020-05-12 | End: 2020-08-05 | Stop reason: SDUPTHER

## 2020-07-08 RX ORDER — TRAMADOL HYDROCHLORIDE 50 MG/1
TABLET ORAL
Qty: 120 TABLET | Refills: 2 | OUTPATIENT
Start: 2020-07-08

## 2020-08-05 ENCOUNTER — OFFICE VISIT (OUTPATIENT)
Dept: FAMILY MEDICINE CLINIC | Facility: CLINIC | Age: 75
End: 2020-08-05

## 2020-08-05 VITALS
TEMPERATURE: 96.9 F | SYSTOLIC BLOOD PRESSURE: 132 MMHG | WEIGHT: 180 LBS | DIASTOLIC BLOOD PRESSURE: 80 MMHG | RESPIRATION RATE: 16 BRPM | HEIGHT: 64 IN | BODY MASS INDEX: 30.73 KG/M2 | HEART RATE: 98 BPM | OXYGEN SATURATION: 96 %

## 2020-08-05 DIAGNOSIS — F33.0 MILD EPISODE OF RECURRENT MAJOR DEPRESSIVE DISORDER (HCC): ICD-10-CM

## 2020-08-05 DIAGNOSIS — E55.9 VITAMIN D DEFICIENCY: ICD-10-CM

## 2020-08-05 DIAGNOSIS — I10 BENIGN ESSENTIAL HYPERTENSION: ICD-10-CM

## 2020-08-05 DIAGNOSIS — M79.7 FIBROMYALGIA: Primary | ICD-10-CM

## 2020-08-05 DIAGNOSIS — F41.9 ANXIETY: ICD-10-CM

## 2020-08-05 DIAGNOSIS — E78.2 MIXED HYPERLIPIDEMIA: ICD-10-CM

## 2020-08-05 DIAGNOSIS — E53.8 B12 DEFICIENCY: ICD-10-CM

## 2020-08-05 DIAGNOSIS — E53.8 LOW FOLIC ACID: ICD-10-CM

## 2020-08-05 DIAGNOSIS — N18.30 CKD (CHRONIC KIDNEY DISEASE) STAGE 3, GFR 30-59 ML/MIN (HCC): ICD-10-CM

## 2020-08-05 PROCEDURE — 99214 OFFICE O/P EST MOD 30 MIN: CPT | Performed by: PHYSICIAN ASSISTANT

## 2020-08-05 RX ORDER — FOLIC ACID 1 MG/1
1000 TABLET ORAL DAILY
Qty: 90 TABLET | Refills: 3 | Status: SHIPPED | OUTPATIENT
Start: 2020-08-05 | End: 2021-04-15 | Stop reason: SDUPTHER

## 2020-08-05 RX ORDER — TRAMADOL HYDROCHLORIDE 50 MG/1
50 TABLET ORAL EVERY 6 HOURS PRN
Qty: 120 TABLET | Refills: 2 | Status: SHIPPED | OUTPATIENT
Start: 2020-08-05 | End: 2020-10-29 | Stop reason: SDUPTHER

## 2020-08-05 NOTE — PROGRESS NOTES
Subjective   Miguelina Mueller is a 75 y.o. female.     History of Present Illness   Miguelina Mueller female 73 y.o. who presents today for follow up of Depression and Anxiety.  She reports medication does help and this is her best med and tried several. Onset of symptoms was approximately several years ago.  She denies current suicidal and homicidal ideation. Risk factors are chronic illness and chronic pain.  Previous treatment includes current Rx.  She complains of the following medication side effects: none.  The patient declines to go to counseling..  She remains on Ultram for Fibro pain and does help and tolerates it well.  The patient has read and signed the [facility] Controlled Substance Contract.  I will continue to see patient for regular follow up appointments.  They are well controlled on their medication.  NATA has been reviewed by me and is updated every 3 months. The patient is aware of the potential for addiction and dependence. She remains on Ultram for Fibro pain and does help and tolerates it well.  Wants to wait on mammo and DEXA  Since the last visit, she has overall felt tired.  She has Essential Hypertension and well controlled on current medication, Impaired fasting glucose and will monitor labs to watch for DMII, Hyperlipidemia with goals met with current Rx, Atrial Fibillation and remains under the care of their cardiologist for management and Vitamin D deficiency and will update labs for continued management.  she has been compliant with current medications have reviewed them.  The patient denies medication side effects.  Will refill medications.    Watching renal labs and avoid NSAIDs    The following portions of the patient's history were reviewed and updated as appropriate: allergies, current medications, past family history, past medical history, past social history, past surgical history and problem list.    Review of Systems   Constitutional: Positive for fatigue. Negative for  activity change, appetite change and unexpected weight change.   HENT: Negative for nosebleeds and trouble swallowing.    Eyes: Negative for pain and visual disturbance.   Respiratory: Negative for chest tightness, shortness of breath and wheezing.    Cardiovascular: Negative for chest pain and palpitations.   Gastrointestinal: Negative for abdominal pain and blood in stool.   Endocrine: Negative.    Genitourinary: Negative for difficulty urinating and hematuria.   Musculoskeletal: Positive for arthralgias, back pain, myalgias and neck pain. Negative for joint swelling.   Skin: Negative for color change and rash.   Allergic/Immunologic: Negative.    Neurological: Negative for syncope and speech difficulty.   Hematological: Negative for adenopathy.   Psychiatric/Behavioral: Positive for dysphoric mood and sleep disturbance. Negative for agitation and confusion.   All other systems reviewed and are negative.      Objective   Physical Exam   Constitutional: She is oriented to person, place, and time. She appears well-developed and well-nourished. No distress.   HENT:   Head: Normocephalic and atraumatic.   Eyes: Pupils are equal, round, and reactive to light. Conjunctivae and EOM are normal. Right eye exhibits no discharge. Left eye exhibits no discharge. No scleral icterus.   Neck: Normal range of motion. Neck supple. No tracheal deviation present. No thyromegaly present.   Cardiovascular: Normal rate, regular rhythm, normal heart sounds, intact distal pulses and normal pulses. Exam reveals no gallop.   No murmur heard.  Pulmonary/Chest: Effort normal and breath sounds normal. No respiratory distress. She has no wheezes. She has no rales.   Musculoskeletal: Normal range of motion.   Neurological: She is alert and oriented to person, place, and time. She exhibits normal muscle tone. Coordination normal.   Skin: Skin is warm. No rash noted. No erythema. No pallor.   Psychiatric: She has a normal mood and affect. Her  behavior is normal. Judgment and thought content normal.   Nursing note and vitals reviewed.      Assessment/Plan   Miguelina was seen today for fibromyalgia.    Diagnoses and all orders for this visit:    Fibromyalgia    Anxiety    Low folic acid    B12 deficiency    Vitamin D deficiency    CKD (chronic kidney disease) stage 3, GFR 30-59 ml/min (CMS/[facility])    Mild episode of recurrent major depressive disorder (CMS/[facility])    Mixed hyperlipidemia    Benign essential hypertension    Plan, Miguelina Mueller, was seen today.  she was seen for HTN and continue medication, Hyperlipidemia and will continue current medication, Atrial Fibrillation and remains under the care of their cardiologist for medical management and Vitamin D deficiency and will update labs .  Cont Cymbalta for mood, anxiety and pain---helps some  Refill Ultram. Avoid NSAIDs d/t renal labs  Update labs  Cont. B12 and folate  Refill Ultram for fibro pain and helps some

## 2020-08-05 NOTE — PATIENT INSTRUCTIONS

## 2020-09-09 RX ORDER — AMIODARONE HYDROCHLORIDE 200 MG/1
TABLET ORAL
Qty: 90 TABLET | Refills: 0 | OUTPATIENT
Start: 2020-09-09

## 2020-10-01 RX ORDER — TRAMADOL HYDROCHLORIDE 50 MG/1
TABLET ORAL
Qty: 120 TABLET | Refills: 2 | OUTPATIENT
Start: 2020-10-01

## 2020-10-12 RX ORDER — TRAMADOL HYDROCHLORIDE 50 MG/1
TABLET ORAL
Qty: 120 TABLET | Refills: 2 | OUTPATIENT
Start: 2020-10-12

## 2020-10-29 ENCOUNTER — OFFICE VISIT (OUTPATIENT)
Dept: FAMILY MEDICINE CLINIC | Facility: CLINIC | Age: 75
End: 2020-10-29

## 2020-10-29 VITALS
HEIGHT: 64 IN | OXYGEN SATURATION: 96 % | WEIGHT: 190 LBS | DIASTOLIC BLOOD PRESSURE: 96 MMHG | SYSTOLIC BLOOD PRESSURE: 138 MMHG | HEART RATE: 69 BPM | TEMPERATURE: 96.6 F | BODY MASS INDEX: 32.44 KG/M2

## 2020-10-29 DIAGNOSIS — N18.31 STAGE 3A CHRONIC KIDNEY DISEASE (HCC): ICD-10-CM

## 2020-10-29 DIAGNOSIS — R73.01 IFG (IMPAIRED FASTING GLUCOSE): ICD-10-CM

## 2020-10-29 DIAGNOSIS — E78.2 MIXED HYPERLIPIDEMIA: ICD-10-CM

## 2020-10-29 DIAGNOSIS — M79.7 FIBROMYALGIA: ICD-10-CM

## 2020-10-29 DIAGNOSIS — E55.9 VITAMIN D DEFICIENCY: ICD-10-CM

## 2020-10-29 DIAGNOSIS — Z23 NEED FOR INFLUENZA VACCINATION: ICD-10-CM

## 2020-10-29 DIAGNOSIS — F41.9 ANXIETY: ICD-10-CM

## 2020-10-29 DIAGNOSIS — E53.8 B12 DEFICIENCY: ICD-10-CM

## 2020-10-29 DIAGNOSIS — E53.8 LOW FOLIC ACID: ICD-10-CM

## 2020-10-29 DIAGNOSIS — I48.0 PAF (PAROXYSMAL ATRIAL FIBRILLATION) (HCC): Primary | ICD-10-CM

## 2020-10-29 PROCEDURE — G0008 ADMIN INFLUENZA VIRUS VAC: HCPCS | Performed by: PHYSICIAN ASSISTANT

## 2020-10-29 PROCEDURE — 99214 OFFICE O/P EST MOD 30 MIN: CPT | Performed by: PHYSICIAN ASSISTANT

## 2020-10-29 PROCEDURE — 90694 VACC AIIV4 NO PRSRV 0.5ML IM: CPT | Performed by: PHYSICIAN ASSISTANT

## 2020-10-29 PROCEDURE — 93000 ELECTROCARDIOGRAM COMPLETE: CPT | Performed by: PHYSICIAN ASSISTANT

## 2020-10-29 RX ORDER — TRAMADOL HYDROCHLORIDE 50 MG/1
50 TABLET ORAL EVERY 6 HOURS PRN
Qty: 120 TABLET | Refills: 2 | Status: SHIPPED | OUTPATIENT
Start: 2020-10-29 | End: 2021-01-25 | Stop reason: SDUPTHER

## 2020-10-29 RX ORDER — BUSPIRONE HYDROCHLORIDE 5 MG/1
5 TABLET ORAL 2 TIMES DAILY
Qty: 60 TABLET | Refills: 5 | Status: SHIPPED | OUTPATIENT
Start: 2020-10-29 | End: 2021-01-25

## 2020-10-29 NOTE — PROGRESS NOTES
Procedure     ECG 12 Lead    Date/Time: 10/29/2020 4:03 PM  Performed by: Yuki Pennington PA-C  Authorized by: Yuki Pennington PA-C   Comparison: compared with previous ECG from 9/2/2019  Rhythm: atrial fibrillation  Ectopy: infrequent PVCs  Rate: tachycardic  BPM: 111  Conduction: conduction normal  ST Segments: ST segments normal  T Waves: T waves normal  QRS axis: normal  Other: no other findings    Clinical impression: abnormal EKG  Comments: EKG Interpretation Report    Heart rate:    111 beats/min, ME interval:  -- msec, QRS duration:  86 msec  QTu:372 msec, QTc:  505 msec

## 2020-10-29 NOTE — PROGRESS NOTES
"Subjective   Miguelina Mueller is a 75 y.o. female.     History of Present Illness   Miguelina Mueller female 75 y.o., /96   Pulse 69   Temp 96.6 °F (35.9 °C)   Ht 162.6 cm (64.02\")   Wt 86.2 kg (190 lb)   LMP  (LMP Unknown)   SpO2 96%   BMI 32.60 kg/m²   who presents today for follow up of Depression, Anxiety and Cymbalta helps patient with anxiety and depression and her chronic pain but no that she gets breakthrough symptoms daily.  Her anxiety is been worse with the pandemic and with her 's health.  She knows that her anxiety is a problem in were talking today about adding BuSpar to her Cymbalta in a low dose due to her already taking Ultram and the Cymbalta to help with her anxiety.  She is feeling more overwhelmed than she ever has and feels like she cannot accomplish anything.  She is behind on getting her labs she has not seen the cardiologist and let her generic Pacerone prescription run out. .  She reports depressed mood, fatigue, anxiety, anhedonia, impaired concentration, excessive worry, unable to relax and sleep disturbance. Onset of symptoms was approximately several years ago.  She denies current suicidal and homicidal ideation. Risk factors are chronic illness and chronic pain. Spouse is chronically ill.  Previous treatment includes current Rx.  She complains of the following medication side effects: none.  The patient declines to go to counseling..   Since the last visit, she has overall felt tired and anxious.  She has History of hypertension has not taken medication for treatment since her  paroxysmal atrial fibrillation diagnosis and is under the care of cardiologist--- although overdue for appointment, B12 and folic acid deficiency and again labs need to be updated.  She also has hyperlipidemia mixed type, again need labs updated to make sure her LDL goal is been met.  Vitamin D deficiency and need lab for this as well.  she has been compliant with current medications have reviewed " "them.  The patient denies medication side effects.  Will refill medications.  Patient also has KCD 3 and avoids NSAIDs.  /96   Pulse 69   Temp 96.6 °F (35.9 °C)   Ht 162.6 cm (64.02\")   Wt 86.2 kg (190 lb)   LMP  (LMP Unknown)   SpO2 96%   BMI 32.60 kg/m²     Results for orders placed or performed during the hospital encounter of 09/02/19   Comprehensive Metabolic Panel    Specimen: Blood   Result Value Ref Range    Glucose 104 (H) 65 - 99 mg/dL    BUN 15 8 - 23 mg/dL    Creatinine 1.09 (H) 0.57 - 1.00 mg/dL    Sodium 136 136 - 145 mmol/L    Potassium 4.1 3.5 - 5.2 mmol/L    Chloride 99 98 - 107 mmol/L    CO2 23.6 22.0 - 29.0 mmol/L    Calcium 9.5 8.6 - 10.5 mg/dL    Total Protein 7.0 6.0 - 8.5 g/dL    Albumin 4.10 3.50 - 5.20 g/dL    ALT (SGPT) 15 1 - 33 U/L    AST (SGOT) 20 1 - 32 U/L    Alkaline Phosphatase 115 39 - 117 U/L    Total Bilirubin 0.5 0.2 - 1.2 mg/dL    eGFR Non African Amer 49 (L) >60 mL/min/1.73    Globulin 2.9 gm/dL    A/G Ratio 1.4 g/dL    BUN/Creatinine Ratio 13.8 7.0 - 25.0    Anion Gap 13.4 5.0 - 15.0 mmol/L   BNP    Specimen: Blood   Result Value Ref Range    proBNP 345.8 5.0 - 900.0 pg/mL   Troponin    Specimen: Blood   Result Value Ref Range    Troponin T <0.010 0.000 - 0.030 ng/mL   Protime-INR    Specimen: Blood   Result Value Ref Range    Protime 12.4 11.7 - 14.2 Seconds    INR 0.95 0.90 - 1.10   CBC Auto Differential    Specimen: Blood   Result Value Ref Range    WBC 12.92 (H) 3.40 - 10.80 10*3/mm3    RBC 4.92 3.77 - 5.28 10*6/mm3    Hemoglobin 15.0 12.0 - 15.9 g/dL    Hematocrit 47.6 (H) 34.0 - 46.6 %    MCV 96.7 79.0 - 97.0 fL    MCH 30.5 26.6 - 33.0 pg    MCHC 31.5 31.5 - 35.7 g/dL    RDW 13.7 12.3 - 15.4 %    RDW-SD 49.5 37.0 - 54.0 fl    MPV 11.4 6.0 - 12.0 fL    Platelets 201 140 - 450 10*3/mm3    Neutrophil % 70.6 42.7 - 76.0 %    Lymphocyte % 17.4 (L) 19.6 - 45.3 %    Monocyte % 7.9 5.0 - 12.0 %    Eosinophil % 3.1 0.3 - 6.2 %    Basophil % 0.6 0.0 - 1.5 %    " Immature Grans % 0.4 0.0 - 0.5 %    Neutrophils, Absolute 9.12 (H) 1.70 - 7.00 10*3/mm3    Lymphocytes, Absolute 2.25 0.70 - 3.10 10*3/mm3    Monocytes, Absolute 1.02 (H) 0.10 - 0.90 10*3/mm3    Eosinophils, Absolute 0.40 0.00 - 0.40 10*3/mm3    Basophils, Absolute 0.08 0.00 - 0.20 10*3/mm3    Immature Grans, Absolute 0.05 0.00 - 0.05 10*3/mm3    nRBC 0.0 0.0 - 0.2 /100 WBC   Light Blue Top   Result Value Ref Range    Extra Tube hold for add-on    Green Top (Gel)   Result Value Ref Range    Extra Tube Hold for add-ons.    Lavender Top   Result Value Ref Range    Extra Tube hold for add-on    Gold Top - SST   Result Value Ref Range    Extra Tube Hold for add-ons.      Still advised patient update her mammogram and bone density      The following portions of the patient's history were reviewed and updated as appropriate: allergies, current medications, past family history, past medical history, past social history, past surgical history and problem list.    Review of Systems   Constitutional: Positive for fatigue. Negative for activity change, appetite change and unexpected weight change.   HENT: Negative for nosebleeds and trouble swallowing.    Eyes: Negative for pain and visual disturbance.   Respiratory: Positive for shortness of breath. Negative for cough, chest tightness and wheezing.    Cardiovascular: Negative for chest pain and palpitations.   Gastrointestinal: Negative for abdominal pain and blood in stool.   Endocrine: Negative.    Genitourinary: Negative for difficulty urinating and hematuria.   Musculoskeletal: Positive for arthralgias, back pain, myalgias and neck pain. Negative for joint swelling.   Skin: Negative for color change and rash.   Allergic/Immunologic: Negative.    Neurological: Negative for syncope and speech difficulty.   Hematological: Negative for adenopathy.   Psychiatric/Behavioral: Positive for dysphoric mood and sleep disturbance. Negative for agitation and confusion.   All other  systems reviewed and are negative.      Objective   Physical Exam  Vitals signs and nursing note reviewed.   Constitutional:       General: She is not in acute distress.     Appearance: She is well-developed. She is ill-appearing. She is not toxic-appearing or diaphoretic.   HENT:      Head: Normocephalic.      Right Ear: External ear normal.      Left Ear: External ear normal.      Nose: Nose normal.      Mouth/Throat:      Pharynx: Oropharynx is clear.   Eyes:      General: No scleral icterus.     Conjunctiva/sclera: Conjunctivae normal.      Pupils: Pupils are equal, round, and reactive to light.   Neck:      Musculoskeletal: Normal range of motion and neck supple.      Thyroid: No thyromegaly.      Vascular: No carotid bruit.   Cardiovascular:      Rate and Rhythm: Tachycardia present. Rhythm irregular.      Pulses: Normal pulses.      Heart sounds: Normal heart sounds. No murmur.      Comments: Her rhythm is irregular and regular, no murmurs, I do hear an occasional single PVC type beat, concerned that rate is fast--- will get EKG to make sure she is not having rapid ventricular responses like she did at diagnosis of her PAF.  I am also worried that she is complaining of worsening shortness of breath and will make sure she does not have any evidence of recent or remote MI by checking EKG today  Pulmonary:      Effort: Pulmonary effort is normal. No respiratory distress.      Breath sounds: Normal breath sounds. No wheezing or rhonchi.   Musculoskeletal: Normal range of motion.         General: No deformity.      Right lower leg: No edema.      Left lower leg: No edema.   Skin:     General: Skin is warm and dry.      Coloration: Skin is not jaundiced.      Findings: No rash.   Neurological:      General: No focal deficit present.      Mental Status: She is alert and oriented to person, place, and time. Mental status is at baseline.   Psychiatric:         Mood and Affect: Mood normal.         Behavior: Behavior  normal.         Thought Content: Thought content normal.         Judgment: Judgment normal.         Assessment/Plan   Diagnoses and all orders for this visit:    1. IFG (impaired fasting glucose) (Primary)    2. Need for influenza vaccination  -     Fluad Quad >65 years    3. Anxiety    4. B12 deficiency    5. Low folic acid    6. Mixed hyperlipidemia    7. Fibromyalgia  -     traMADol (ULTRAM) 50 MG tablet; Take 1 tablet by mouth Every 6 (Six) Hours As Needed for Moderate Pain . for pain  Dispense: 120 tablet; Refill: 2    8. PAF (paroxysmal atrial fibrillation) (CMS/HCC)  -     ECG 12 Lead    9. Stage 3a chronic kidney disease    10. Vitamin D deficiency    Other orders  -     busPIRone (BUSPAR) 5 MG tablet; Take 1 tablet by mouth 2 (two) times a day. PRN anxiety ---still take Cymbalta  Dispense: 60 tablet; Refill: 5        Add Buspar for anxiety to Cymbalta  Get labs  Sent urgent message to Dr. Dover, cardiologist, due to her heart rate today with A. fib and occasional PVC--- question if I can restart her amiodarone at 200 mg until he can see her???  Concern with heart rate in the low 100s --does she need to be on diltiazem?  And her blood pressure is elevated today as well  Shortness of breath that she has had for years has been worse in the last 12 months  Cough resolved from February  Continue B12, vitamin D, folic acid, pravastatin for mixed hyperlipidemia with labs overdue  Follow-up with cardiology for PAF and above  Dr. Dover did review my note and advised to restart her on amiodarone at 400 mg a day for the first week then 200 mg daily and make an appointment with him

## 2020-10-30 RX ORDER — AMIODARONE HYDROCHLORIDE 200 MG/1
200 TABLET ORAL DAILY
Qty: 90 TABLET | Refills: 0 | Status: SHIPPED | OUTPATIENT
Start: 2020-10-30 | End: 2021-01-25

## 2020-11-06 LAB
25(OH)D3+25(OH)D2 SERPL-MCNC: 46.7 NG/ML (ref 30–100)
ALBUMIN SERPL-MCNC: 4.5 G/DL (ref 3.5–5.2)
ALBUMIN/GLOB SERPL: 2.1 G/DL
ALP SERPL-CCNC: 121 U/L (ref 39–117)
ALT SERPL-CCNC: 14 U/L (ref 1–33)
AST SERPL-CCNC: 20 U/L (ref 1–32)
BASOPHILS # BLD AUTO: 0.11 10*3/MM3 (ref 0–0.2)
BASOPHILS NFR BLD AUTO: 1 % (ref 0–1.5)
BILIRUB SERPL-MCNC: 0.6 MG/DL (ref 0–1.2)
BUN SERPL-MCNC: 17 MG/DL (ref 8–23)
BUN/CREAT SERPL: 14 (ref 7–25)
CALCIUM SERPL-MCNC: 9.3 MG/DL (ref 8.6–10.5)
CHLORIDE SERPL-SCNC: 100 MMOL/L (ref 98–107)
CHOLEST SERPL-MCNC: 194 MG/DL (ref 0–200)
CO2 SERPL-SCNC: 27.4 MMOL/L (ref 22–29)
CREAT SERPL-MCNC: 1.21 MG/DL (ref 0.57–1)
EOSINOPHIL # BLD AUTO: 0.41 10*3/MM3 (ref 0–0.4)
EOSINOPHIL NFR BLD AUTO: 3.6 % (ref 0.3–6.2)
ERYTHROCYTE [DISTWIDTH] IN BLOOD BY AUTOMATED COUNT: 13.1 % (ref 12.3–15.4)
FOLATE SERPL-MCNC: >20 NG/ML (ref 4.78–24.2)
GLOBULIN SER CALC-MCNC: 2.1 GM/DL
GLUCOSE SERPL-MCNC: 91 MG/DL (ref 65–99)
HBA1C MFR BLD: 5.57 % (ref 4.8–5.6)
HCT VFR BLD AUTO: 49.9 % (ref 34–46.6)
HDLC SERPL-MCNC: 72 MG/DL (ref 40–60)
HGB BLD-MCNC: 16.4 G/DL (ref 12–15.9)
IMM GRANULOCYTES # BLD AUTO: 0.06 10*3/MM3 (ref 0–0.05)
IMM GRANULOCYTES NFR BLD AUTO: 0.5 % (ref 0–0.5)
LDLC SERPL CALC-MCNC: 99 MG/DL (ref 0–100)
LYMPHOCYTES # BLD AUTO: 3.08 10*3/MM3 (ref 0.7–3.1)
LYMPHOCYTES NFR BLD AUTO: 27.4 % (ref 19.6–45.3)
MCH RBC QN AUTO: 31 PG (ref 26.6–33)
MCHC RBC AUTO-ENTMCNC: 32.9 G/DL (ref 31.5–35.7)
MCV RBC AUTO: 94.3 FL (ref 79–97)
MONOCYTES # BLD AUTO: 0.74 10*3/MM3 (ref 0.1–0.9)
MONOCYTES NFR BLD AUTO: 6.6 % (ref 5–12)
NEUTROPHILS # BLD AUTO: 6.85 10*3/MM3 (ref 1.7–7)
NEUTROPHILS NFR BLD AUTO: 60.9 % (ref 42.7–76)
NRBC BLD AUTO-RTO: 0 /100 WBC (ref 0–0.2)
PLATELET # BLD AUTO: 218 10*3/MM3 (ref 140–450)
POTASSIUM SERPL-SCNC: 4.4 MMOL/L (ref 3.5–5.2)
PROT SERPL-MCNC: 6.6 G/DL (ref 6–8.5)
RBC # BLD AUTO: 5.29 10*6/MM3 (ref 3.77–5.28)
SODIUM SERPL-SCNC: 137 MMOL/L (ref 136–145)
T3FREE SERPL-MCNC: 2.9 PG/ML (ref 2–4.4)
T4 FREE SERPL-MCNC: 1.26 NG/DL (ref 0.93–1.7)
TRIGL SERPL-MCNC: 135 MG/DL (ref 0–150)
TSH SERPL DL<=0.005 MIU/L-ACNC: 4.47 UIU/ML (ref 0.27–4.2)
VIT B12 SERPL-MCNC: 1379 PG/ML (ref 211–946)
VLDLC SERPL CALC-MCNC: 23 MG/DL (ref 5–40)
WBC # BLD AUTO: 11.25 10*3/MM3 (ref 3.4–10.8)

## 2020-11-11 ENCOUNTER — TELEPHONE (OUTPATIENT)
Dept: CARDIOLOGY | Facility: CLINIC | Age: 75
End: 2020-11-11

## 2020-11-11 NOTE — TELEPHONE ENCOUNTER
Vicki,  pts PCP is requesting an apt to be seen and the  sent her to my voicemail to see if we can offer anything sooner.    This is the PCP notes below, can you advise where to schedule her?  Thanks  Estella De La Rosa RN  Triage nurse

## 2020-11-12 ENCOUNTER — OFFICE VISIT (OUTPATIENT)
Dept: CARDIOLOGY | Facility: CLINIC | Age: 75
End: 2020-11-12

## 2020-11-12 VITALS
DIASTOLIC BLOOD PRESSURE: 100 MMHG | HEART RATE: 66 BPM | WEIGHT: 190 LBS | OXYGEN SATURATION: 96 % | HEIGHT: 64 IN | BODY MASS INDEX: 32.44 KG/M2 | SYSTOLIC BLOOD PRESSURE: 178 MMHG

## 2020-11-12 DIAGNOSIS — I10 BENIGN ESSENTIAL HYPERTENSION: ICD-10-CM

## 2020-11-12 DIAGNOSIS — I48.0 PAF (PAROXYSMAL ATRIAL FIBRILLATION) (HCC): Primary | ICD-10-CM

## 2020-11-12 PROCEDURE — 99214 OFFICE O/P EST MOD 30 MIN: CPT | Performed by: INTERNAL MEDICINE

## 2020-11-13 ENCOUNTER — TELEPHONE (OUTPATIENT)
Dept: CARDIOLOGY | Facility: CLINIC | Age: 75
End: 2020-11-13

## 2020-11-13 LAB
FERRITIN SERPL-MCNC: 169 NG/ML (ref 13–150)
IRON SATN MFR SERPL: 23 % (ref 20–50)
IRON SERPL-MCNC: 97 MCG/DL (ref 37–145)
Lab: NORMAL
TIBC SERPL-MCNC: 421 MCG/DL
UIBC SERPL-MCNC: 324 MCG/DL (ref 112–346)
WRITTEN AUTHORIZATION: NORMAL

## 2020-11-16 RX ORDER — DILTIAZEM HYDROCHLORIDE 180 MG/1
180 CAPSULE, EXTENDED RELEASE ORAL DAILY
Qty: 30 CAPSULE | Refills: 11 | Status: SHIPPED | OUTPATIENT
Start: 2020-11-16 | End: 2021-11-22

## 2020-11-16 NOTE — PROGRESS NOTES
Subjective:     Encounter Date:11/12/2020      Patient ID: Miguelina Mueller is a 75 y.o. female.    Chief Complaint:  Atrial Fibrillation  Presents for follow-up visit. Symptoms include shortness of breath. Symptoms are negative for chest pain, hypertension and syncope. The symptoms have been stable. Past medical history includes atrial fibrillation.   Hypertension  This is a chronic problem. The problem is controlled. Associated symptoms include malaise/fatigue and shortness of breath. Pertinent negatives include no chest pain.       75-year-old female who presents today via telephone visit.  Clinically patient continues to do well except for her fatigue.  She says she is just grown to excepted.  She exercises much as he can she says it really has not changed significantly recently.    Review of Systems   Constitution: Positive for malaise/fatigue.   Cardiovascular: Negative for chest pain and syncope.   Respiratory: Positive for shortness of breath.    Psychiatric/Behavioral: The patient is nervous/anxious.    All other systems reviewed and are negative.      Procedures         Objective:     Physical Exam   Constitutional: She is oriented to person, place, and time. She appears well-developed.   HENT:   Head: Normocephalic.   Eyes: Conjunctivae are normal.   Neck: Normal range of motion.   Cardiovascular: Normal rate and normal heart sounds. An irregular rhythm present.   Pulmonary/Chest: Breath sounds normal.   Abdominal: Soft. Bowel sounds are normal.   Musculoskeletal: Normal range of motion.         General: No edema.   Neurological: She is alert and oriented to person, place, and time.   Skin: Skin is warm and dry.   Psychiatric: She has a normal mood and affect. Her behavior is normal.   Vitals reviewed.    Telephone visit     lab Review:       Assessment:          Diagnosis Plan   1. PAF (paroxysmal atrial fibrillation) (CMS/[facility])     2. Benign essential hypertension            Plan:       1.  Paroxysmal  A. fib patient is back in atrial fibrillation.  Patient was placed back on amiodarone which is a reasonable choice.  She did not tolerate Xarelto very well so this time I put her on Eliquis 5 mg twice a day.  I also started Cardizem to help rate control as well as may be convert as well as to get her blood pressure under better control.  2.  Hypertension elevated today.  I started diltiazem.  3.  Will reassess in 1 week.

## 2020-11-19 ENCOUNTER — OFFICE VISIT (OUTPATIENT)
Dept: CARDIOLOGY | Facility: CLINIC | Age: 75
End: 2020-11-19

## 2020-11-19 VITALS
BODY MASS INDEX: 32.44 KG/M2 | OXYGEN SATURATION: 94 % | HEART RATE: 78 BPM | SYSTOLIC BLOOD PRESSURE: 140 MMHG | DIASTOLIC BLOOD PRESSURE: 88 MMHG | WEIGHT: 190 LBS | HEIGHT: 64 IN

## 2020-11-19 DIAGNOSIS — I10 BENIGN ESSENTIAL HYPERTENSION: ICD-10-CM

## 2020-11-19 DIAGNOSIS — M79.7 FIBROMYALGIA: ICD-10-CM

## 2020-11-19 DIAGNOSIS — I48.0 PAF (PAROXYSMAL ATRIAL FIBRILLATION) (HCC): Primary | ICD-10-CM

## 2020-11-19 PROCEDURE — 99213 OFFICE O/P EST LOW 20 MIN: CPT | Performed by: INTERNAL MEDICINE

## 2020-11-19 NOTE — PROGRESS NOTES
Subjective:     Encounter Date:11/19/20        Patient ID: Miguelina Mueller is a 75 y.o. female.    Chief Complaint:  Atrial Fibrillation  Presents for follow-up visit. Symptoms include shortness of breath. Symptoms are negative for chest pain, hypertension and syncope. The symptoms have been stable. Past medical history includes atrial fibrillation.   Hypertension  This is a chronic problem. The problem is controlled. Associated symptoms include malaise/fatigue and shortness of breath. Pertinent negatives include no chest pain.       75-year-old female who presents today via telephone visit.  Clinically patient continues to do well except for her fatigue.  She says she is just grown to excepted.  She exercises much as he can she says it really has not changed significantly recently.    Review of Systems   Constitution: Positive for malaise/fatigue.   Cardiovascular: Negative for chest pain and syncope.   Respiratory: Positive for shortness of breath.    Psychiatric/Behavioral: The patient is nervous/anxious.    All other systems reviewed and are negative.      Procedures         Objective:     Physical Exam   Constitutional: She is oriented to person, place, and time. She appears well-developed.   HENT:   Head: Normocephalic.   Eyes: Conjunctivae are normal.   Neck: Normal range of motion.   Cardiovascular: Normal rate and normal heart sounds. An irregular rhythm present.   Pulmonary/Chest: Breath sounds normal.   Abdominal: Soft. Bowel sounds are normal.   Musculoskeletal: Normal range of motion.         General: No edema.   Neurological: She is alert and oriented to person, place, and time.   Skin: Skin is warm and dry.   Psychiatric: She has a normal mood and affect. Her behavior is normal.   Vitals reviewed.    Telephone visit     lab Review:       Assessment:          Diagnosis Plan   1. PAF (paroxysmal atrial fibrillation) (CMS/[facility])     2. Benign essential hypertension     3. Fibromyalgia            Plan:        1.  Paroxysmal A. fib patient is back in atrial fibrillation.  Patient is feeling better today.  She could not afford the Eliquis so she wanted to try Xarelto again.  I told her about the side effects are reported in the past and she says she still wants to try that again.  Overall she is much better we will continue same her heart rate was definitely better controlled.  2.  Hypertension elevated today.  I started diltiazem.  3.  Will reassess in 4 week.

## 2020-12-24 ENCOUNTER — OFFICE VISIT (OUTPATIENT)
Dept: CARDIOLOGY | Facility: CLINIC | Age: 75
End: 2020-12-24

## 2020-12-24 VITALS
OXYGEN SATURATION: 96 % | WEIGHT: 190 LBS | HEIGHT: 64 IN | HEART RATE: 82 BPM | DIASTOLIC BLOOD PRESSURE: 100 MMHG | SYSTOLIC BLOOD PRESSURE: 162 MMHG | BODY MASS INDEX: 32.44 KG/M2

## 2020-12-24 DIAGNOSIS — I10 BENIGN ESSENTIAL HYPERTENSION: ICD-10-CM

## 2020-12-24 DIAGNOSIS — I48.0 PAF (PAROXYSMAL ATRIAL FIBRILLATION) (HCC): Primary | ICD-10-CM

## 2020-12-24 PROCEDURE — 99214 OFFICE O/P EST MOD 30 MIN: CPT | Performed by: INTERNAL MEDICINE

## 2020-12-24 NOTE — PROGRESS NOTES
Subjective:     Encounter Date:12/24/20        Patient ID: Miguelina Mueller is a 75 y.o. female.    Chief Complaint:  Atrial Fibrillation  Presents for follow-up visit. Symptoms include shortness of breath. Symptoms are negative for chest pain, hypertension and syncope. The symptoms have been stable. Past medical history includes atrial fibrillation.   Hypertension  This is a chronic problem. The problem is controlled. Associated symptoms include malaise/fatigue and shortness of breath. Pertinent negatives include no chest pain.       75-year-old female who presents today for reevaluation.  Patient was able to take her Xarelto she is tolerating it well.  It was about half the cost of Eliquis.  She still has a shortness of breath and fatigue which has been chronic for a long time.  Her blood pressure was elevated today when she checks it at home it is okay she says if she exerts herself it goes up quickly I told her continue to follow it.  She also has a little bit of swelling in her left lower extremity she says she has had this before but this was little bit worse than it had been in the past in the last day or 2 it has improved significantly.  Was not painful.    Review of Systems   Constitution: Positive for malaise/fatigue.   Cardiovascular: Negative for chest pain and syncope.   Respiratory: Positive for shortness of breath.    Psychiatric/Behavioral: The patient is nervous/anxious.    All other systems reviewed and are negative.      Procedures         Objective:     Physical Exam   Constitutional: She is oriented to person, place, and time. She appears well-developed.   HENT:   Head: Normocephalic.   Eyes: Conjunctivae are normal.   Neck: Normal range of motion.   Cardiovascular: Normal rate and normal heart sounds. An irregular rhythm present.   Pulmonary/Chest: Breath sounds normal.   Abdominal: Soft. Bowel sounds are normal.   Musculoskeletal: Normal range of motion.         General: No edema.    Neurological: She is alert and oriented to person, place, and time.   Skin: Skin is warm and dry.   Psychiatric: She has a normal mood and affect. Her behavior is normal.   Vitals reviewed.    Telephone visit     lab Review:       Assessment:         No diagnosis found.       Plan:       1.  Paroxysmal A. fib patient physical exam remains in atrial fibrillation.  Heart rate is well controlled she is on Xarelto doing well.  2.  Hypertension elevated today pressure is high today but better at home continue to follow it..  3.  Lower extremity edema.  Is unilateral on the left side.  It would be pretty unusual have a blood clot especially since she is on Xarelto.  It has improved so we will follow clinically it could also be a side effect of her calcium channel blocker.  If it does not continue to improve she was told to call me back I would set her up for vascular ultrasound to make sure she does have a DVT but she has no other signs or pain and again being on anticoagulation this would be very unusual.  4.  Follow-up 6 months sooner if issues

## 2020-12-26 DIAGNOSIS — M79.7 FIBROMYALGIA: ICD-10-CM

## 2020-12-26 RX ORDER — TRAMADOL HYDROCHLORIDE 50 MG/1
TABLET ORAL
Qty: 120 TABLET | Refills: 2 | OUTPATIENT
Start: 2020-12-26

## 2021-01-25 ENCOUNTER — OFFICE VISIT (OUTPATIENT)
Dept: FAMILY MEDICINE CLINIC | Facility: CLINIC | Age: 76
End: 2021-01-25

## 2021-01-25 VITALS
HEART RATE: 80 BPM | RESPIRATION RATE: 16 BRPM | WEIGHT: 187 LBS | TEMPERATURE: 97.5 F | BODY MASS INDEX: 31.92 KG/M2 | DIASTOLIC BLOOD PRESSURE: 80 MMHG | OXYGEN SATURATION: 100 % | HEIGHT: 64 IN | SYSTOLIC BLOOD PRESSURE: 130 MMHG

## 2021-01-25 DIAGNOSIS — E53.8 LOW FOLIC ACID: ICD-10-CM

## 2021-01-25 DIAGNOSIS — M79.7 FIBROMYALGIA: Primary | ICD-10-CM

## 2021-01-25 DIAGNOSIS — E55.9 VITAMIN D DEFICIENCY: ICD-10-CM

## 2021-01-25 DIAGNOSIS — E53.8 B12 DEFICIENCY: ICD-10-CM

## 2021-01-25 DIAGNOSIS — F41.9 ANXIETY: ICD-10-CM

## 2021-01-25 DIAGNOSIS — E78.2 MIXED HYPERLIPIDEMIA: ICD-10-CM

## 2021-01-25 DIAGNOSIS — I10 BENIGN ESSENTIAL HTN: ICD-10-CM

## 2021-01-25 DIAGNOSIS — F33.0 MILD EPISODE OF RECURRENT MAJOR DEPRESSIVE DISORDER (HCC): ICD-10-CM

## 2021-01-25 DIAGNOSIS — R73.01 IFG (IMPAIRED FASTING GLUCOSE): ICD-10-CM

## 2021-01-25 DIAGNOSIS — I48.0 PAF (PAROXYSMAL ATRIAL FIBRILLATION) (HCC): ICD-10-CM

## 2021-01-25 DIAGNOSIS — N18.31 STAGE 3A CHRONIC KIDNEY DISEASE (HCC): ICD-10-CM

## 2021-01-25 PROCEDURE — 99213 OFFICE O/P EST LOW 20 MIN: CPT | Performed by: PHYSICIAN ASSISTANT

## 2021-01-25 PROCEDURE — G0439 PPPS, SUBSEQ VISIT: HCPCS | Performed by: PHYSICIAN ASSISTANT

## 2021-01-25 RX ORDER — DULOXETIN HYDROCHLORIDE 60 MG/1
60 CAPSULE, DELAYED RELEASE ORAL DAILY
Qty: 30 CAPSULE | Refills: 11 | Status: SHIPPED | OUTPATIENT
Start: 2021-01-25 | End: 2022-01-31

## 2021-01-25 RX ORDER — TRAMADOL HYDROCHLORIDE 50 MG/1
50 TABLET ORAL EVERY 6 HOURS PRN
Qty: 120 TABLET | Refills: 2 | Status: SHIPPED | OUTPATIENT
Start: 2021-01-25 | End: 2021-04-15 | Stop reason: SDUPTHER

## 2021-01-25 RX ORDER — PRAVASTATIN SODIUM 80 MG/1
80 TABLET ORAL DAILY
Qty: 30 TABLET | Refills: 11 | Status: SHIPPED | OUTPATIENT
Start: 2021-01-25 | End: 2021-03-05

## 2021-01-25 NOTE — PROGRESS NOTES
"Subjective   Miguelina Mueller is a 75 y.o. female.     History of Present Illness    Since the last visit, she has overall felt tired.  She has Essential Hypertension and well controlled on current medication, Hyperlipidemia with goals met with current Rx, Atrial Fibillation and remains under the care of their cardiologist for management and Vitamin D deficiency and labs are at goal >30 ng/mL.  she has been compliant with current medications have reviewed them.  The patient denies medication side effects.  Will refill medications. /80 (BP Location: Left arm, Patient Position: Sitting, Cuff Size: Adult)   Pulse 80   Temp 97.5 °F (36.4 °C)   Resp 16   Ht 162.6 cm (64.02\")   Wt 84.8 kg (187 lb)   LMP  (LMP Unknown)   SpO2 100%   BMI 32.08 kg/m²     Results for orders placed or performed in visit on 05/05/20   Comprehensive metabolic panel    Specimen: Blood   Result Value Ref Range    Glucose 91 65 - 99 mg/dL    BUN 17 8 - 23 mg/dL    Creatinine 1.21 (H) 0.57 - 1.00 mg/dL    eGFR Non African Am 44 (L) >60 mL/min/1.73    eGFR African Am 53 (L) >60 mL/min/1.73    BUN/Creatinine Ratio 14.0 7.0 - 25.0    Sodium 137 136 - 145 mmol/L    Potassium 4.4 3.5 - 5.2 mmol/L    Chloride 100 98 - 107 mmol/L    Total CO2 27.4 22.0 - 29.0 mmol/L    Calcium 9.3 8.6 - 10.5 mg/dL    Total Protein 6.6 6.0 - 8.5 g/dL    Albumin 4.50 3.50 - 5.20 g/dL    Globulin 2.1 gm/dL    A/G Ratio 2.1 g/dL    Total Bilirubin 0.6 0.0 - 1.2 mg/dL    Alkaline Phosphatase 121 (H) 39 - 117 U/L    AST (SGOT) 20 1 - 32 U/L    ALT (SGPT) 14 1 - 33 U/L   Lipid panel    Specimen: Blood   Result Value Ref Range    Total Cholesterol 194 0 - 200 mg/dL    Triglycerides 135 0 - 150 mg/dL    HDL Cholesterol 72 (H) 40 - 60 mg/dL    VLDL Cholesterol Aidan 23 5 - 40 mg/dL    LDL Chol Calc (NIH) 99 0 - 100 mg/dL   TSH    Specimen: Blood   Result Value Ref Range    TSH 4.470 (H) 0.270 - 4.200 uIU/mL   Hemoglobin A1c    Specimen: Blood   Result Value Ref Range    " Hemoglobin A1C 5.57 4.80 - 5.60 %   T3, Free    Specimen: Blood   Result Value Ref Range    T3, Free 2.9 2.0 - 4.4 pg/mL   T4, Free    Specimen: Blood   Result Value Ref Range    Free T4 1.26 0.93 - 1.70 ng/dL   Vitamin D 25 Hydroxy    Specimen: Blood   Result Value Ref Range    25 Hydroxy, Vitamin D 46.7 30.0 - 100.0 ng/ml   Vitamin B12    Specimen: Blood   Result Value Ref Range    Vitamin B-12 1,379 (H) 211 - 946 pg/mL   Folate    Specimen: Blood   Result Value Ref Range    Folate >20.00 4.78 - 24.20 ng/mL   Iron Profile   Result Value Ref Range    TIBC 421 mcg/dL    UIBC 324 112 - 346 mcg/dL    Iron 97 37 - 145 mcg/dL    Iron Saturation 23 20 - 50 %   Ferritin   Result Value Ref Range    Ferritin 169.00 (H) 13.00 - 150.00 ng/mL   Please Note   Result Value Ref Range    Please note Comment    Written Authorization   Result Value Ref Range    Written Authorization Comment    CBC and Differential    Specimen: Blood   Result Value Ref Range    WBC 11.25 (H) 3.40 - 10.80 10*3/mm3    RBC 5.29 (H) 3.77 - 5.28 10*6/mm3    Hemoglobin 16.4 (H) 12.0 - 15.9 g/dL    Hematocrit 49.9 (H) 34.0 - 46.6 %    MCV 94.3 79.0 - 97.0 fL    MCH 31.0 26.6 - 33.0 pg    MCHC 32.9 31.5 - 35.7 g/dL    RDW 13.1 12.3 - 15.4 %    Platelets 218 140 - 450 10*3/mm3    Neutrophil Rel % 60.9 42.7 - 76.0 %    Lymphocyte Rel % 27.4 19.6 - 45.3 %    Monocyte Rel % 6.6 5.0 - 12.0 %    Eosinophil Rel % 3.6 0.3 - 6.2 %    Basophil Rel % 1.0 0.0 - 1.5 %    Neutrophils Absolute 6.85 1.70 - 7.00 10*3/mm3    Lymphocytes Absolute 3.08 0.70 - 3.10 10*3/mm3    Monocytes Absolute 0.74 0.10 - 0.90 10*3/mm3    Eosinophils Absolute 0.41 (H) 0.00 - 0.40 10*3/mm3    Basophils Absolute 0.11 0.00 - 0.20 10*3/mm3    Immature Granulocyte Rel % 0.5 0.0 - 0.5 %    Immature Grans Absolute 0.06 (H) 0.00 - 0.05 10*3/mm3    nRBC 0.0 0.0 - 0.2 /100 WBC     Still on B12 and folate  Still advised patient update her mammogram and bone density    Sees cardio  Just changed Eliquis to  "Xarelto and now on Dilitazem; s    Recent edema LLE and resolved without tx  Miguelina Mueller female 75 y.o., /80 (BP Location: Left arm, Patient Position: Sitting, Cuff Size: Adult)   Pulse 80   Temp 97.5 °F (36.4 °C)   Resp 16   Ht 162.6 cm (64.02\")   Wt 84.8 kg (187 lb)   LMP  (LMP Unknown)   SpO2 100%   BMI 32.08 kg/m²   who presents today for follow up of Depression and Anxiety.  She reports overall Cymbalta helps but still anxiety and depression---this is her best med. isolation with COVID and  poor health. Chronic pain and fatigue. On Ultram---cannot take NSAID d/t renal labs.. Onset of symptoms was approximately several years ago.  She denies current suicidal and homicidal ideation. Risk factors are lifestyle of multiple roles, chronic illness and chronic pain.  Previous treatment includes current Rx.  She complains of the following medication side effects: none.  The patient declines to go to counseling..  No help Buspar    The patient has read and signed the [facility] Controlled Substance Contract.  I will continue to see patient for regular follow up appointments.  They are well controlled on their medication.  NATA has been reviewed by me and is updated every 3 months. The patient is aware of the potential for addiction and dependence.    The following portions of the patient's history were reviewed and updated as appropriate: allergies, current medications, past family history, past medical history, past social history, past surgical history and problem list.    Review of Systems   Constitutional: Positive for fatigue. Negative for activity change, appetite change and unexpected weight change.   HENT: Negative for nosebleeds and trouble swallowing.    Eyes: Negative for pain and visual disturbance.   Respiratory: Negative for chest tightness, shortness of breath and wheezing.    Cardiovascular: Negative for chest pain and palpitations.   Gastrointestinal: Negative for abdominal " pain and blood in stool.   Endocrine: Negative.    Genitourinary: Negative for difficulty urinating and hematuria.   Musculoskeletal: Positive for myalgias. Negative for joint swelling.   Skin: Negative for color change and rash.   Allergic/Immunologic: Negative.    Neurological: Negative for syncope and speech difficulty.   Hematological: Negative for adenopathy.   Psychiatric/Behavioral: Negative for agitation and confusion.   All other systems reviewed and are negative.      Objective   Physical Exam  Constitutional:       General: She is not in acute distress.     Appearance: She is well-developed. She is not ill-appearing, toxic-appearing or diaphoretic.   HENT:      Head: Normocephalic and atraumatic.   Eyes:      General: No scleral icterus.     Conjunctiva/sclera: Conjunctivae normal.   Neck:      Musculoskeletal: Normal range of motion.      Vascular: No carotid bruit.   Cardiovascular:      Rate and Rhythm: Rhythm irregular.      Pulses: Normal pulses.   Pulmonary:      Effort: Pulmonary effort is normal. No respiratory distress.      Breath sounds: No rales.   Musculoskeletal: Normal range of motion.      Right lower leg: Edema present.      Left lower leg: Edema present.      Comments: Trace pedal edema = bilat     Skin:     General: Skin is dry.      Coloration: Skin is not jaundiced.   Neurological:      Mental Status: She is alert and oriented to person, place, and time. Mental status is at baseline.      Coordination: Coordination normal.   Psychiatric:         Mood and Affect: Mood normal.         Behavior: Behavior normal.         Thought Content: Thought content normal.         Judgment: Judgment normal.         Assessment/Plan   Diagnoses and all orders for this visit:    1. Fibromyalgia (Primary)    2. IFG (impaired fasting glucose)    3. PAF (paroxysmal atrial fibrillation) (CMS/[facility])    4. B12 deficiency    5. Low folic acid    6. Vitamin D deficiency    7. Mild episode of recurrent major  depressive disorder (CMS/HCC)    8. Anxiety    9. Mixed hyperlipidemia    10. Benign essential HTN    11. Stage 3a chronic kidney disease (CMS/HCC)    still on B12 and folate  bp came down  Just had labs  Plan, Miguelina Mueller, was seen today.  she was seen for HTN and continue medication, Hyperlipidemia and will continue current medication, Atrial Fibrillation and remains under the care of their cardiologist for medical management and Vitamin D deficiency and supplemented.  Cont Cymbalta for anxiety and depression--helps  Ultram for fibro pain d/t renal labs  CKD3 stable

## 2021-01-25 NOTE — PROGRESS NOTES
The ABCs of the Annual Wellness Visit  Subsequent Medicare Wellness Visit    Chief Complaint   Patient presents with   • Hyperlipidemia   • Hypertension       Subjective   History of Present Illness:  Miguelina Mueller is a 75 y.o. female who presents for a Subsequent Medicare Wellness Visit.    HEALTH RISK ASSESSMENT    Recent Hospitalizations:  No hospitalization(s) within the last year.    Current Medical Providers:  Patient Care Team:  Yuki Pennington PA-C as PCP - General  Yuki Pennington PA-C as PCP - Family Medicine  Rajesh Dover MD as Consulting Physician (Cardiology)    Smoking Status:  Social History     Tobacco Use   Smoking Status Former Smoker   • Packs/day: 0.25   Smokeless Tobacco Never Used   Tobacco Comment    quit around 2007       Alcohol Consumption:  Social History     Substance and Sexual Activity   Alcohol Use Yes    Comment: rare    ///     caffeine use       Depression Screen:   PHQ-2/PHQ-9 Depression Screening 1/25/2021   Little interest or pleasure in doing things 0   Feeling down, depressed, or hopeless 0   Trouble falling or staying asleep, or sleeping too much 1   Feeling tired or having little energy 1   Poor appetite or overeating 1   Feeling bad about yourself - or that you are a failure or have let yourself or your family down 0   Trouble concentrating on things, such as reading the newspaper or watching television 0   Moving or speaking so slowly that other people could have noticed. Or the opposite - being so fidgety or restless that you have been moving around a lot more than usual 0   Thoughts that you would be better off dead, or of hurting yourself in some way 0   Total Score 3       Fall Risk Screen:  STEADI Fall Risk Assessment has not been completed.    Health Habits and Functional and Cognitive Screening:  Functional & Cognitive Status 1/25/2021   Do you have difficulty preparing food and eating? No   Do you have difficulty bathing yourself, getting dressed or grooming  yourself? No   Do you have difficulty using the toilet? No   Do you have difficulty moving around from place to place? No   Do you have trouble with steps or getting out of a bed or a chair? No   Current Diet Unhealthy Diet   Dental Exam Not up to date   Eye Exam Not up to date   Exercise (times per week) 0 times per week   Current Exercises Include No Regular Exercise   Current Exercise Activities Include -   Do you need help using the phone?  No   Are you deaf or do you have serious difficulty hearing?  No   Do you need help with transportation? No   Do you need help shopping? No   Do you need help preparing meals?  No   Do you need help with housework?  No   Do you need help with laundry? No   Do you need help taking your medications? No   Do you need help managing money? No   Do you ever drive or ride in a car without wearing a seat belt? No   Have you felt unusual stress, anger or loneliness in the last month? No   Who do you live with? Spouse   If you need help, do you have trouble finding someone available to you? No   Have you been bothered in the last four weeks by sexual problems? No   Do you have difficulty concentrating, remembering or making decisions? No         Does the patient have evidence of cognitive impairment? No    Asprin use counseling:Does not need ASA (and currently is not on it)    Age-appropriate Screening Schedule:  Refer to the list below for future screening recommendations based on patient's age, sex and/or medical conditions. Orders for these recommended tests are listed in the plan section. The patient has been provided with a written plan.    Health Maintenance   Topic Date Due   • DXA SCAN  07/19/2018   • PAP SMEAR  07/19/2019   • MAMMOGRAM  04/05/2021 (Originally 9/13/2019)   • ZOSTER VACCINE (2 of 2) 08/14/2021 (Originally 3/3/2017)   • COLONOSCOPY  02/09/2021   • LIPID PANEL  11/05/2021   • TDAP/TD VACCINES (2 - Td) 10/12/2026   • INFLUENZA VACCINE  Completed          The  following portions of the patient's history were reviewed and updated as appropriate: allergies, current medications, past family history, past medical history, past social history, past surgical history and problem list.    Outpatient Medications Prior to Visit   Medication Sig Dispense Refill   • amiodarone (PACERONE) 200 MG tablet Take 1 tablet by mouth Daily. TWO PO daily for 1 week then ONE PO daily for heart 90 tablet 0   • busPIRone (BUSPAR) 5 MG tablet Take 1 tablet by mouth 2 (two) times a day. PRN anxiety ---still take Cymbalta 60 tablet 5   • Cholecalciferol (D3 Super Strength) 50 MCG (2000 UT) capsule Take 1 capsule by mouth Daily. 90 capsule 3   • Cyanocobalamin (VITAMIN B-12) 1000 MCG sublingual tablet DISSOLVE UNDER TONGUE ONE TABLET DAILY 90 each 3   • dilTIAZem XR (DILACOR XR) 180 MG 24 hr capsule Take 1 capsule by mouth Daily. 30 capsule 11   • DULoxetine (CYMBALTA) 60 MG capsule Take 1 capsule by mouth Daily. For mood and fibro pain 30 capsule 11   • folic acid (FOLVITE) 1 MG tablet Take 1 tablet by mouth Daily. 90 tablet 3   • pravastatin (PRAVACHOL) 80 MG tablet Take 1 tablet by mouth Daily. For cholesterol 30 tablet 11   • rivaroxaban (Xarelto) 20 MG tablet Take 1 tablet by mouth Daily. 30 tablet 11   • traMADol (ULTRAM) 50 MG tablet Take 1 tablet by mouth Every 6 (Six) Hours As Needed for Moderate Pain . for pain 120 tablet 2     No facility-administered medications prior to visit.        Patient Active Problem List   Diagnosis   • Anxiety   • Mild episode of recurrent major depressive disorder (CMS/HCC)   • Fibromyalgia   • Hyperlipidemia   • Benign essential hypertension   • IFG (impaired fasting glucose)   • Renal insufficiency   • Shoulder bursitis   • Seasonal allergic rhinitis   • PAF (paroxysmal atrial fibrillation) (CMS/HCC)   • CKD (chronic kidney disease) stage 3, GFR 30-59 ml/min (CMS/HCC)   • Vitamin D deficiency   • B12 deficiency   • Low folic acid       Advanced Care  "Planning:  ACP discussion was held with the patient during this visit. Patient does not have an advance directive, information provided.    Review of Systems    Compared to one year ago, the patient feels her physical health is worse.  Compared to one year ago, the patient feels her mental health is the same.    Reviewed chart for potential of high risk medication in the elderly: yes  Reviewed chart for potential of harmful drug interactions in the elderly:yes    Objective         Vitals:    01/25/21 1444   BP: 160/80   BP Location: Left arm   Patient Position: Sitting   Cuff Size: Adult   Pulse: 80   Resp: 16   Temp: 97.5 °F (36.4 °C)   SpO2: 100%   Weight: 84.8 kg (187 lb)   Height: 162.6 cm (64.02\")       Body mass index is 32.08 kg/m².  Discussed the patient's BMI with her. The BMI is above average; BMI management plan is completed.    Physical Exam    Lab Results   Component Value Date    GLU 91 11/05/2020    CHLPL 194 11/05/2020    TRIG 135 11/05/2020    HDL 72 (H) 11/05/2020    LDL 99 11/05/2020    VLDL 23 11/05/2020    HGBA1C 5.57 11/05/2020        Assessment/Plan   Medicare Risks and Personalized Health Plan  CMS Preventative Services Quick Reference  Cardiovascular risk  Osteoprorosis Risk    The above risks/problems have been discussed with the patient.  Pertinent information has been shared with the patient in the After Visit Summary.  Follow up plans and orders are seen below in the Assessment/Plan Section.    There are no diagnoses linked to this encounter.  Follow Up:  No follow-ups on file.     An After Visit Summary and PPPS were given to the patient.             "

## 2021-03-05 RX ORDER — PRAVASTATIN SODIUM 80 MG/1
TABLET ORAL
Qty: 30 TABLET | Refills: 11 | Status: SHIPPED | OUTPATIENT
Start: 2021-03-05 | End: 2022-03-07

## 2021-03-12 ENCOUNTER — BULK ORDERING (OUTPATIENT)
Dept: CASE MANAGEMENT | Facility: OTHER | Age: 76
End: 2021-03-12

## 2021-03-12 DIAGNOSIS — Z23 IMMUNIZATION DUE: ICD-10-CM

## 2021-03-23 DIAGNOSIS — M79.7 FIBROMYALGIA: ICD-10-CM

## 2021-03-23 RX ORDER — TRAMADOL HYDROCHLORIDE 50 MG/1
TABLET ORAL
Qty: 120 TABLET | Refills: 2 | OUTPATIENT
Start: 2021-03-23

## 2021-04-15 ENCOUNTER — OFFICE VISIT (OUTPATIENT)
Dept: FAMILY MEDICINE CLINIC | Facility: CLINIC | Age: 76
End: 2021-04-15

## 2021-04-15 VITALS
RESPIRATION RATE: 16 BRPM | TEMPERATURE: 97.5 F | DIASTOLIC BLOOD PRESSURE: 63 MMHG | HEART RATE: 82 BPM | HEIGHT: 64 IN | OXYGEN SATURATION: 98 % | BODY MASS INDEX: 31.41 KG/M2 | SYSTOLIC BLOOD PRESSURE: 123 MMHG | WEIGHT: 184 LBS

## 2021-04-15 DIAGNOSIS — M79.7 FIBROMYALGIA: ICD-10-CM

## 2021-04-15 DIAGNOSIS — N18.31 STAGE 3A CHRONIC KIDNEY DISEASE (HCC): Primary | ICD-10-CM

## 2021-04-15 DIAGNOSIS — E55.9 VITAMIN D DEFICIENCY: ICD-10-CM

## 2021-04-15 DIAGNOSIS — D63.8 ANEMIA OF CHRONIC DISEASE: ICD-10-CM

## 2021-04-15 DIAGNOSIS — E53.8 LOW FOLIC ACID: ICD-10-CM

## 2021-04-15 DIAGNOSIS — F33.0 MILD EPISODE OF RECURRENT MAJOR DEPRESSIVE DISORDER (HCC): ICD-10-CM

## 2021-04-15 DIAGNOSIS — I10 BENIGN ESSENTIAL HTN: ICD-10-CM

## 2021-04-15 DIAGNOSIS — R73.01 IFG (IMPAIRED FASTING GLUCOSE): ICD-10-CM

## 2021-04-15 PROCEDURE — 99213 OFFICE O/P EST LOW 20 MIN: CPT | Performed by: PHYSICIAN ASSISTANT

## 2021-04-15 RX ORDER — TRAMADOL HYDROCHLORIDE 50 MG/1
50 TABLET ORAL EVERY 6 HOURS PRN
Qty: 120 TABLET | Refills: 2 | Status: SHIPPED | OUTPATIENT
Start: 2021-04-15 | End: 2021-07-16 | Stop reason: SDUPTHER

## 2021-04-15 RX ORDER — FOLIC ACID 1 MG/1
1000 TABLET ORAL DAILY
Qty: 90 TABLET | Refills: 3 | Status: SHIPPED | OUTPATIENT
Start: 2021-04-15 | End: 2022-06-21 | Stop reason: SDUPTHER

## 2021-04-15 RX ORDER — MAGNESIUM 200 MG
TABLET ORAL
Qty: 90 EACH | Refills: 3 | Status: SHIPPED | OUTPATIENT
Start: 2021-04-15 | End: 2022-03-07

## 2021-04-15 NOTE — PATIENT INSTRUCTIONS
Myofascial Pain Syndrome and Fibromyalgia  Myofascial pain syndrome and fibromyalgia are both pain disorders. This pain may be felt mainly in your muscles.  · Myofascial pain syndrome:  ? Always has tender points in the muscle that will cause pain when pressed (trigger points). The pain may come and go.  ? Usually affects your neck, upper back, and shoulder areas. The pain often radiates into your arms and hands.  · Fibromyalgia:  ? Has muscle pains and tenderness that come and go.  ? Is often associated with fatigue and sleep problems.  ? Has trigger points.  ? Tends to be long-lasting (chronic), but is not life-threatening.  Fibromyalgia and myofascial pain syndrome are not the same. However, they often occur together. If you have both conditions, each can make the other worse. Both are common and can cause enough pain and fatigue to make day-to-day activities difficult. Both can be hard to diagnose because their symptoms are common in many other conditions.  What are the causes?  The exact causes of these conditions are not known.  What increases the risk?  You are more likely to develop this condition if:  · You have a family history of the condition.  · You have certain triggers, such as:  ? Spine disorders.  ? An injury (trauma) or other physical stressors.  ? Being under a lot of stress.  ? Medical conditions such as osteoarthritis, rheumatoid arthritis, or lupus.  What are the signs or symptoms?  Fibromyalgia  The main symptom of fibromyalgia is widespread pain and tenderness in your muscles. Pain is sometimes described as stabbing, shooting, or burning.  You may also have:  · Tingling or numbness.  · Sleep problems and fatigue.  · Problems with attention and concentration (fibro fog).  Other symptoms may include:   · Bowel and bladder problems.  · Headaches.  · Visual problems.  · Problems with odors and noises.  · Depression or mood changes.  · Painful menstrual periods (dysmenorrhea).  · Dry skin or  eyes.  These symptoms can vary over time.  Myofascial pain syndrome  Symptoms of myofascial pain syndrome include:  · Tight, ropy bands of muscle.  · Uncomfortable sensations in muscle areas. These may include aching, cramping, burning, numbness, tingling, and weakness.  · Difficulty moving certain parts of the body freely (poor range of motion).  How is this diagnosed?  This condition may be diagnosed by your symptoms and medical history. You will also have a physical exam. In general:  · Fibromyalgia is diagnosed if you have pain, fatigue, and other symptoms for more than 3 months, and symptoms cannot be explained by another condition.  · Myofascial pain syndrome is diagnosed if you have trigger points in your muscles, and those trigger points are tender and cause pain elsewhere in your body (referred pain).  How is this treated?  Treatment for these conditions depends on the type that you have.  · For fibromyalgia:  ? Pain medicines, such as NSAIDs.  ? Medicines for treating depression.  ? Medicines for treating seizures.  ? Medicines that relax the muscles.  · For myofascial pain:  ? Pain medicines, such as NSAIDs.  ? Cooling and stretching of muscles.  ? Trigger point injections.  ? Sound wave (ultrasound) treatments to stimulate muscles.  Treating these conditions often requires a team of health care providers. These may include:  · Your primary care provider.  · Physical therapist.  · Complementary health care providers, such as massage therapists or acupuncturists.  · Psychiatrist for cognitive behavioral therapy.  Follow these instructions at home:  Medicines  · Take over-the-counter and prescription medicines only as told by your health care provider.  · Do not drive or use heavy machinery while taking prescription pain medicine.  · If you are taking prescription pain medicine, take actions to prevent or treat constipation. Your health care provider may recommend that you:  ? Drink enough fluid to keep  your urine pale yellow.  ? Eat foods that are high in fiber, such as fresh fruits and vegetables, whole grains, and beans.  ? Limit foods that are high in fat and processed sugars, such as fried or sweet foods.  ? Take an over-the-counter or prescription medicine for constipation.  Lifestyle    · Exercise as directed by your health care provider or physical therapist.  · Practice relaxation techniques to control your stress. You may want to try:  ? Biofeedback.  ? Visual imagery.  ? Hypnosis.  ? Muscle relaxation.  ? Yoga.  ? Meditation.  · Maintain a healthy lifestyle. This includes eating a healthy diet and getting enough sleep.  · Do not use any products that contain nicotine or tobacco, such as cigarettes and e-cigarettes. If you need help quitting, ask your health care provider.  General instructions  · Talk to your health care provider about complementary treatments, such as acupuncture or massage.  · Consider joining a support group with others who are diagnosed with this condition.  · Do not do activities that stress or strain your muscles. This includes repetitive motions and heavy lifting.  · Keep all follow-up visits as told by your health care provider. This is important.  Where to find more information  · National Fibromyalgia Association: www.fmaware.org  · Arthritis Foundation: www.arthritis.org  · American Chronic Pain Association: www.theacpa.org  Contact a health care provider if:  · You have new symptoms.  · Your symptoms get worse or your pain is severe.  · You have side effects from your medicines.  · You have trouble sleeping.  · Your condition is causing depression or anxiety.  Summary  · Myofascial pain syndrome and fibromyalgia are pain disorders.  · Myofascial pain syndrome has tender points in the muscle that will cause pain when pressed (trigger points). Fibromyalgia also has muscle pains and tenderness that come and go, but this condition is often associated with fatigue and sleep  disturbances.  · Fibromyalgia and myofascial pain syndrome are not the same but often occur together, causing pain and fatigue that make day-to-day activities difficult.  · Treatment for fibromyalgia includes taking medicines to relax the muscles and medicines for pain, depression, or seizures. Treatment for myofascial pain syndrome includes taking medicines for pain, cooling and stretching of muscles, and injecting medicines into trigger points.  · Follow your health care provider's instructions for taking medicines and maintaining a healthy lifestyle.  This information is not intended to replace advice given to you by your health care provider. Make sure you discuss any questions you have with your health care provider.  Document Revised: 04/10/2020 Document Reviewed: 01/02/2019  Elsevier Patient Education © 2021 [employer]

## 2021-04-15 NOTE — PROGRESS NOTES
"Subjective   Miguelina Mueller is a 76 y.o. female.     History of Present Illness    Since the last visit, she has overall felt tired.  She has Essential Hypertension and well controlled on current medication, Impaired fasting glucose and will monitor labs to watch for DMII, Hyperlipidemia with goals met with current Rx, Atrial Fibillation and remains under the care of their cardiologist for management and Vitamin D deficiency and labs are at goal >30 ng/mL.  she has been compliant with current medications have reviewed them.  The patient denies medication side effects.  Will refill medications. /63 (BP Location: Left arm, Patient Position: Sitting, Cuff Size: Adult)   Pulse 82   Temp 97.5 °F (36.4 °C)   Resp 16   Ht 162.6 cm (64.02\")   Wt 83.5 kg (184 lb)   LMP  (LMP Unknown)   SpO2 98%   BMI 31.57 kg/m²   Did labs Nov and proved anemia of chronic disease; no NSAIDs  Cont on folic acid and vit D  Results for orders placed or performed in visit on 05/05/20   Comprehensive metabolic panel    Specimen: Blood   Result Value Ref Range    Glucose 91 65 - 99 mg/dL    BUN 17 8 - 23 mg/dL    Creatinine 1.21 (H) 0.57 - 1.00 mg/dL    eGFR Non African Am 44 (L) >60 mL/min/1.73    eGFR African Am 53 (L) >60 mL/min/1.73    BUN/Creatinine Ratio 14.0 7.0 - 25.0    Sodium 137 136 - 145 mmol/L    Potassium 4.4 3.5 - 5.2 mmol/L    Chloride 100 98 - 107 mmol/L    Total CO2 27.4 22.0 - 29.0 mmol/L    Calcium 9.3 8.6 - 10.5 mg/dL    Total Protein 6.6 6.0 - 8.5 g/dL    Albumin 4.50 3.50 - 5.20 g/dL    Globulin 2.1 gm/dL    A/G Ratio 2.1 g/dL    Total Bilirubin 0.6 0.0 - 1.2 mg/dL    Alkaline Phosphatase 121 (H) 39 - 117 U/L    AST (SGOT) 20 1 - 32 U/L    ALT (SGPT) 14 1 - 33 U/L   Lipid panel    Specimen: Blood   Result Value Ref Range    Total Cholesterol 194 0 - 200 mg/dL    Triglycerides 135 0 - 150 mg/dL    HDL Cholesterol 72 (H) 40 - 60 mg/dL    VLDL Cholesterol Aidan 23 5 - 40 mg/dL    LDL Chol Calc ([facility]) 99 0 - 100 mg/dL "   TSH    Specimen: Blood   Result Value Ref Range    TSH 4.470 (H) 0.270 - 4.200 uIU/mL   Hemoglobin A1c    Specimen: Blood   Result Value Ref Range    Hemoglobin A1C 5.57 4.80 - 5.60 %   T3, Free    Specimen: Blood   Result Value Ref Range    T3, Free 2.9 2.0 - 4.4 pg/mL   T4, Free    Specimen: Blood   Result Value Ref Range    Free T4 1.26 0.93 - 1.70 ng/dL   Vitamin D 25 Hydroxy    Specimen: Blood   Result Value Ref Range    25 Hydroxy, Vitamin D 46.7 30.0 - 100.0 ng/ml   Vitamin B12    Specimen: Blood   Result Value Ref Range    Vitamin B-12 1,379 (H) 211 - 946 pg/mL   Folate    Specimen: Blood   Result Value Ref Range    Folate >20.00 4.78 - 24.20 ng/mL   Iron Profile   Result Value Ref Range    TIBC 421 mcg/dL    UIBC 324 112 - 346 mcg/dL    Iron 97 37 - 145 mcg/dL    Iron Saturation 23 20 - 50 %   Ferritin   Result Value Ref Range    Ferritin 169.00 (H) 13.00 - 150.00 ng/mL   Please Note   Result Value Ref Range    Please note Comment    Written Authorization   Result Value Ref Range    Written Authorization Comment    CBC and Differential    Specimen: Blood   Result Value Ref Range    WBC 11.25 (H) 3.40 - 10.80 10*3/mm3    RBC 5.29 (H) 3.77 - 5.28 10*6/mm3    Hemoglobin 16.4 (H) 12.0 - 15.9 g/dL    Hematocrit 49.9 (H) 34.0 - 46.6 %    MCV 94.3 79.0 - 97.0 fL    MCH 31.0 26.6 - 33.0 pg    MCHC 32.9 31.5 - 35.7 g/dL    RDW 13.1 12.3 - 15.4 %    Platelets 218 140 - 450 10*3/mm3    Neutrophil Rel % 60.9 42.7 - 76.0 %    Lymphocyte Rel % 27.4 19.6 - 45.3 %    Monocyte Rel % 6.6 5.0 - 12.0 %    Eosinophil Rel % 3.6 0.3 - 6.2 %    Basophil Rel % 1.0 0.0 - 1.5 %    Neutrophils Absolute 6.85 1.70 - 7.00 10*3/mm3    Lymphocytes Absolute 3.08 0.70 - 3.10 10*3/mm3    Monocytes Absolute 0.74 0.10 - 0.90 10*3/mm3    Eosinophils Absolute 0.41 (H) 0.00 - 0.40 10*3/mm3    Basophils Absolute 0.11 0.00 - 0.20 10*3/mm3    Immature Granulocyte Rel % 0.5 0.0 - 0.5 %    Immature Grans Absolute 0.06 (H) 0.00 - 0.05 10*3/mm3    nRBC  0.0 0.0 - 0.2 /100 WBC       Does well on Xarelto and Diltiazem ---has hx a fib--controlled; sees cardio  The patient has read and signed the [facility] Controlled Substance Contract.  I will continue to see patient for regular follow up appointments.  They are well controlled on their medication.  NATA has been reviewed by me and is updated every 3 months. The patient is aware of the potential for addiction and dependence.  D/t Xarelto and CKD---cont Ultram for fibro pain and Cymbalta---helps some  Cymbalta helps depression some---best med tried.    The following portions of the patient's history were reviewed and updated as appropriate: allergies, current medications, past family history, past medical history, past social history, past surgical history and problem list.    Review of Systems   Constitutional: Negative for activity change, appetite change and unexpected weight change.   HENT: Negative for nosebleeds and trouble swallowing.    Eyes: Negative for pain and visual disturbance.   Respiratory: Negative for chest tightness, shortness of breath and wheezing.    Cardiovascular: Negative for chest pain and palpitations.   Gastrointestinal: Negative for abdominal pain and blood in stool.   Endocrine: Negative.    Genitourinary: Negative for difficulty urinating and hematuria.   Musculoskeletal: Negative for joint swelling.   Skin: Negative for color change and rash.   Allergic/Immunologic: Negative.    Neurological: Negative for syncope and speech difficulty.   Hematological: Negative for adenopathy.   Psychiatric/Behavioral: Negative for agitation and confusion.   All other systems reviewed and are negative.      Objective   Physical Exam  Vitals and nursing note reviewed.   Constitutional:       Appearance: She is well-developed.      Comments: tired   HENT:      Head: Normocephalic.      Right Ear: External ear normal.      Left Ear: External ear normal.      Nose: Nose normal.      Mouth/Throat:       Pharynx: Oropharynx is clear.   Eyes:      General: No scleral icterus.     Conjunctiva/sclera: Conjunctivae normal.      Pupils: Pupils are equal, round, and reactive to light.   Neck:      Thyroid: No thyromegaly.   Cardiovascular:      Rate and Rhythm: Normal rate and regular rhythm.      Pulses: Normal pulses.      Heart sounds: Normal heart sounds. No murmur heard.     Pulmonary:      Effort: Pulmonary effort is normal. No respiratory distress.      Breath sounds: Normal breath sounds.   Musculoskeletal:         General: No deformity. Normal range of motion.      Cervical back: Normal range of motion and neck supple.   Skin:     General: Skin is warm and dry.      Findings: No rash.   Neurological:      General: No focal deficit present.      Mental Status: She is alert and oriented to person, place, and time. Mental status is at baseline.   Psychiatric:         Mood and Affect: Mood normal.         Behavior: Behavior normal.         Thought Content: Thought content normal.         Judgment: Judgment normal.         Assessment/Plan   Diagnoses and all orders for this visit:    1. Stage 3a chronic kidney disease (CMS/HCC) (Primary)    2. Mild episode of recurrent major depressive disorder (CMS/HCC)    3. Fibromyalgia  -     traMADol (ULTRAM) 50 MG tablet; Take 1 tablet by mouth Every 6 (Six) Hours As Needed for Moderate Pain . for pain  Dispense: 120 tablet; Refill: 2    4. IFG (impaired fasting glucose)    5. Vitamin D deficiency    6. Benign essential HTN    7. Low folic acid    8. Anemia of chronic disease    Other orders  -     folic acid (FOLVITE) 1 MG tablet; Take 1 tablet by mouth Daily.  Dispense: 90 tablet; Refill: 3      Refill Folic acid--labs Nov; and B12  Refill Ultram for Fibro  Cymbalta for pain and depression--helps some  Cont Vit D  Plan, Miguelina Mueller, was seen today.  she was seen for HTN and continue medication, Imparied fasting glucose and plan follow up labs, diet, and exercise,  Hyperlipidemia and will continue current medication, Atrial Fibrillation and remains under the care of their cardiologist for medical management and Vitamin D deficiency and supplemented.

## 2021-06-18 DIAGNOSIS — M79.7 FIBROMYALGIA: ICD-10-CM

## 2021-06-18 RX ORDER — TRAMADOL HYDROCHLORIDE 50 MG/1
TABLET ORAL
Qty: 120 TABLET | Refills: 2 | OUTPATIENT
Start: 2021-06-18

## 2021-06-24 ENCOUNTER — OFFICE VISIT (OUTPATIENT)
Dept: CARDIOLOGY | Facility: CLINIC | Age: 76
End: 2021-06-24

## 2021-06-24 VITALS
SYSTOLIC BLOOD PRESSURE: 124 MMHG | DIASTOLIC BLOOD PRESSURE: 76 MMHG | HEIGHT: 64 IN | OXYGEN SATURATION: 94 % | BODY MASS INDEX: 31.58 KG/M2 | HEART RATE: 73 BPM

## 2021-06-24 DIAGNOSIS — I48.0 PAF (PAROXYSMAL ATRIAL FIBRILLATION) (HCC): Primary | ICD-10-CM

## 2021-06-24 DIAGNOSIS — I10 BENIGN ESSENTIAL HTN: ICD-10-CM

## 2021-06-24 PROCEDURE — 99213 OFFICE O/P EST LOW 20 MIN: CPT | Performed by: INTERNAL MEDICINE

## 2021-06-24 NOTE — PROGRESS NOTES
"      CARDIOLOGY    Rajesh Dover MD    ENCOUNTER DATE:  06/24/2021    Miguelina Mueller / 76 y.o. / female        CHIEF COMPLAINT / REASON FOR OFFICE VISIT     PAF (paroxysmal atrial fibrillation) (12/24/2020  Follow up)  Hypertension    HISTORY OF PRESENT ILLNESS       HPI  Miguelina Mueller is a 76 y.o. female who presents today for reevaluation.  Overall she is about the same as she is doing.  She continues to be fatigued and short of breath as it really has not changed much.  She denies any types of new symptoms is tolerating her current medicines nicely.      The following portions of the patient's history were reviewed and updated as appropriate: allergies, current medications, past family history, past medical history, past social history, past surgical history and problem list.      VITAL SIGNS     Visit Vitals  /76 (BP Location: Left arm)   Pulse 73   Ht 162.6 cm (64\")   LMP  (LMP Unknown)   SpO2 94%   BMI 31.58 kg/m²         Wt Readings from Last 3 Encounters:   04/15/21 83.5 kg (184 lb)   01/25/21 84.8 kg (187 lb)   12/24/20 86.2 kg (190 lb)     Body mass index is 31.58 kg/m².      REVIEW OF SYSTEMS   ROS        PHYSICAL EXAMINATION     Constitutional:       Appearance: Healthy appearance.   Pulmonary:      Effort: Pulmonary effort is normal.      Breath sounds: Normal breath sounds.   Cardiovascular:      Normal rate. Regular rhythm. Normal S1. Normal S2.      Murmurs: There is no murmur.      No gallop. No click. No rub.   Pulses:     Intact distal pulses.   Edema:     Peripheral edema absent.   Neurological:      Mental Status: Alert.           REVIEWED DATA     Procedures    Cardiac Procedures:  1.     Lipid Panel    Lipid Panel 11/5/20   Total Cholesterol 194   Triglycerides 135   HDL Cholesterol 72 (A)   VLDL Cholesterol 23   LDL Cholesterol  99   (A) Abnormal value                ASSESSMENT & PLAN      Diagnosis Plan   1. PAF (paroxysmal atrial fibrillation) (CMS/[facility])     2. Benign essential " HTN           SUMMARY/DISCUSSION  1. Paroxysmal atrial fibrillation.  Patient currently tolerating medicine well doing well overall.  2. Hypertension blood pressures great  3. General fatigue is not significantly changed much since I saw her last seems to be very chronic in nature.  4. Follow-up 1 year or sooner if issues.        MEDICATIONS         Discharge Medications          Accurate as of June 24, 2021  1:20 PM. If you have any questions, ask your nurse or doctor.            Continue These Medications      Instructions Start Date   Cholecalciferol 50 MCG (2000 UT) capsule  Commonly known as: D3 Super Strength   2,000 Units, Oral, Daily      dilTIAZem  MG 24 hr capsule  Commonly known as: DILACOR XR   180 mg, Oral, Daily      DULoxetine 60 MG capsule  Commonly known as: CYMBALTA   60 mg, Oral, Daily, For mood and fibro pain      folic acid 1 MG tablet  Commonly known as: FOLVITE   1,000 mcg, Oral, Daily      pravastatin 80 MG tablet  Commonly known as: PRAVACHOL   TAKE ONE TABLET BY MOUTH EVERY DAY      rivaroxaban 20 MG tablet  Commonly known as: Xarelto   20 mg, Oral, Daily      traMADol 50 MG tablet  Commonly known as: ULTRAM   50 mg, Oral, Every 6 Hours PRN, for pain      Vitamin B-12 1000 MCG sublingual tablet   1 SL daily                 **Dragon Disclaimer:   Much of this encounter note is an electronic transcription/translation of spoken language to printed text. The electronic translation of spoken language may permit erroneous, or at times, nonsensical words or phrases to be inadvertently transcribed. Although I have reviewed the note for such errors, some may still exist.

## 2021-07-16 ENCOUNTER — OFFICE VISIT (OUTPATIENT)
Dept: FAMILY MEDICINE CLINIC | Facility: CLINIC | Age: 76
End: 2021-07-16

## 2021-07-16 VITALS
HEART RATE: 80 BPM | DIASTOLIC BLOOD PRESSURE: 82 MMHG | SYSTOLIC BLOOD PRESSURE: 126 MMHG | RESPIRATION RATE: 16 BRPM | TEMPERATURE: 97.5 F | OXYGEN SATURATION: 97 % | HEIGHT: 64 IN | WEIGHT: 182 LBS | BODY MASS INDEX: 31.07 KG/M2

## 2021-07-16 DIAGNOSIS — E53.8 B12 DEFICIENCY: ICD-10-CM

## 2021-07-16 DIAGNOSIS — F41.9 ANXIETY: ICD-10-CM

## 2021-07-16 DIAGNOSIS — E78.2 MIXED HYPERLIPIDEMIA: ICD-10-CM

## 2021-07-16 DIAGNOSIS — F33.0 MILD EPISODE OF RECURRENT MAJOR DEPRESSIVE DISORDER (HCC): ICD-10-CM

## 2021-07-16 DIAGNOSIS — M79.7 FIBROMYALGIA: Primary | ICD-10-CM

## 2021-07-16 DIAGNOSIS — E53.8 LOW FOLIC ACID: ICD-10-CM

## 2021-07-16 DIAGNOSIS — E55.9 VITAMIN D DEFICIENCY: ICD-10-CM

## 2021-07-16 PROCEDURE — 99213 OFFICE O/P EST LOW 20 MIN: CPT | Performed by: PHYSICIAN ASSISTANT

## 2021-07-16 RX ORDER — TRAMADOL HYDROCHLORIDE 50 MG/1
50 TABLET ORAL EVERY 6 HOURS PRN
Qty: 120 TABLET | Refills: 2 | Status: SHIPPED | OUTPATIENT
Start: 2021-07-16 | End: 2021-10-12 | Stop reason: SDUPTHER

## 2021-07-16 NOTE — PROGRESS NOTES
"Subjective   Miguelina Mueller is a 76 y.o. female.     History of Present Illness    Since the last visit, she has overall felt tired.  She has Hyperlipidemia with goals met with current Rx and Vitamin D deficiency and will update labs for continued management.  she has been compliant with current medications have reviewed them.  The patient denies medication side effects.  Will refill medications. /82 (BP Location: Left arm, Patient Position: Sitting, Cuff Size: Adult)   Pulse 80   Temp 97.5 °F (36.4 °C)   Resp 16   Ht 162.6 cm (64.02\")   Wt 82.6 kg (182 lb)   LMP  (LMP Unknown)   SpO2 97%   BMI 31.22 kg/m²   Did labs Nov and proved anemia of chronic disease; no NSAIDs  Cont on folic acid and vit   The patient has read and signed the [facility] Controlled Substance Contract.  I will continue to see patient for regular follow up appointments.  They are well controlled on their medication.  NATA has been reviewed by me and is updated every 3 months. The patient is aware of the potential for addiction and dependence.  D/t Xarelto and CKD---cont Ultram for fibro pain and Cymbalta---helps some----stable  Cymbalta helps depression some---best med tried.  Results for orders placed or performed in visit on 05/05/20   Comprehensive metabolic panel    Specimen: Blood   Result Value Ref Range    Glucose 91 65 - 99 mg/dL    BUN 17 8 - 23 mg/dL    Creatinine 1.21 (H) 0.57 - 1.00 mg/dL    eGFR Non African Am 44 (L) >60 mL/min/1.73    eGFR African Am 53 (L) >60 mL/min/1.73    BUN/Creatinine Ratio 14.0 7.0 - 25.0    Sodium 137 136 - 145 mmol/L    Potassium 4.4 3.5 - 5.2 mmol/L    Chloride 100 98 - 107 mmol/L    Total CO2 27.4 22.0 - 29.0 mmol/L    Calcium 9.3 8.6 - 10.5 mg/dL    Total Protein 6.6 6.0 - 8.5 g/dL    Albumin 4.50 3.50 - 5.20 g/dL    Globulin 2.1 gm/dL    A/G Ratio 2.1 g/dL    Total Bilirubin 0.6 0.0 - 1.2 mg/dL    Alkaline Phosphatase 121 (H) 39 - 117 U/L    AST (SGOT) 20 1 - 32 U/L    ALT (SGPT) 14 " 1 - 33 U/L   Lipid panel    Specimen: Blood   Result Value Ref Range    Total Cholesterol 194 0 - 200 mg/dL    Triglycerides 135 0 - 150 mg/dL    HDL Cholesterol 72 (H) 40 - 60 mg/dL    VLDL Cholesterol Aidan 23 5 - 40 mg/dL    LDL Chol Calc (NIH) 99 0 - 100 mg/dL   TSH    Specimen: Blood   Result Value Ref Range    TSH 4.470 (H) 0.270 - 4.200 uIU/mL   Hemoglobin A1c    Specimen: Blood   Result Value Ref Range    Hemoglobin A1C 5.57 4.80 - 5.60 %   T3, Free    Specimen: Blood   Result Value Ref Range    T3, Free 2.9 2.0 - 4.4 pg/mL   T4, Free    Specimen: Blood   Result Value Ref Range    Free T4 1.26 0.93 - 1.70 ng/dL   Vitamin D 25 Hydroxy    Specimen: Blood   Result Value Ref Range    25 Hydroxy, Vitamin D 46.7 30.0 - 100.0 ng/ml   Vitamin B12    Specimen: Blood   Result Value Ref Range    Vitamin B-12 1,379 (H) 211 - 946 pg/mL   Folate    Specimen: Blood   Result Value Ref Range    Folate >20.00 4.78 - 24.20 ng/mL   Iron Profile   Result Value Ref Range    TIBC 421 mcg/dL    UIBC 324 112 - 346 mcg/dL    Iron 97 37 - 145 mcg/dL    Iron Saturation 23 20 - 50 %   Ferritin   Result Value Ref Range    Ferritin 169.00 (H) 13.00 - 150.00 ng/mL   Please Note   Result Value Ref Range    Please note Comment    Written Authorization   Result Value Ref Range    Written Authorization Comment    CBC and Differential    Specimen: Blood   Result Value Ref Range    WBC 11.25 (H) 3.40 - 10.80 10*3/mm3    RBC 5.29 (H) 3.77 - 5.28 10*6/mm3    Hemoglobin 16.4 (H) 12.0 - 15.9 g/dL    Hematocrit 49.9 (H) 34.0 - 46.6 %    MCV 94.3 79.0 - 97.0 fL    MCH 31.0 26.6 - 33.0 pg    MCHC 32.9 31.5 - 35.7 g/dL    RDW 13.1 12.3 - 15.4 %    Platelets 218 140 - 450 10*3/mm3    Neutrophil Rel % 60.9 42.7 - 76.0 %    Lymphocyte Rel % 27.4 19.6 - 45.3 %    Monocyte Rel % 6.6 5.0 - 12.0 %    Eosinophil Rel % 3.6 0.3 - 6.2 %    Basophil Rel % 1.0 0.0 - 1.5 %    Neutrophils Absolute 6.85 1.70 - 7.00 10*3/mm3    Lymphocytes Absolute 3.08 0.70 - 3.10  10*3/mm3    Monocytes Absolute 0.74 0.10 - 0.90 10*3/mm3    Eosinophils Absolute 0.41 (H) 0.00 - 0.40 10*3/mm3    Basophils Absolute 0.11 0.00 - 0.20 10*3/mm3    Immature Granulocyte Rel % 0.5 0.0 - 0.5 %    Immature Grans Absolute 0.06 (H) 0.00 - 0.05 10*3/mm3    nRBC 0.0 0.0 - 0.2 /100 WBC   just saw cardio-------6=24-21  SUMMARY/DISCUSSION  1. Paroxysmal atrial fibrillation.  Patient currently tolerating medicine well doing well overall.  2. Hypertension blood pressures great  3. General fatigue is not significantly changed much since I saw her last seems to be very chronic in nature.  4. Follow-up 1 year or sooner if issues.     Weight down---she is trying  The following portions of the patient's history were reviewed and updated as appropriate: allergies, current medications, past family history, past medical history, past social history, past surgical history and problem list.    Review of Systems   Constitutional: Negative for activity change, appetite change and unexpected weight change.   HENT: Negative for nosebleeds and trouble swallowing.    Eyes: Negative for pain and visual disturbance.   Respiratory: Negative for chest tightness, shortness of breath and wheezing.    Cardiovascular: Negative for chest pain and palpitations.   Gastrointestinal: Negative for abdominal pain and blood in stool.   Endocrine: Negative.    Genitourinary: Negative for difficulty urinating and hematuria.   Musculoskeletal: Negative for joint swelling.   Skin: Negative for color change and rash.   Allergic/Immunologic: Negative.    Neurological: Negative for syncope and speech difficulty.   Hematological: Negative for adenopathy.   Psychiatric/Behavioral: Negative for agitation and confusion.   All other systems reviewed and are negative.      Objective   Physical Exam  Vitals and nursing note reviewed.   Constitutional:       Appearance: She is well-developed.      Comments: tired   HENT:      Head: Normocephalic.      Right  Ear: External ear normal.      Left Ear: External ear normal.      Nose: Nose normal.      Mouth/Throat:      Pharynx: Oropharynx is clear.   Eyes:      General: No scleral icterus.     Conjunctiva/sclera: Conjunctivae normal.      Pupils: Pupils are equal, round, and reactive to light.   Neck:      Thyroid: No thyromegaly.   Cardiovascular:      Rate and Rhythm: Rhythm irregular.      Pulses: Normal pulses.      Heart sounds: No murmur heard.     Pulmonary:      Effort: Pulmonary effort is normal. No respiratory distress.   Musculoskeletal:         General: No deformity. Normal range of motion.      Cervical back: Normal range of motion and neck supple.   Skin:     General: Skin is warm and dry.      Findings: No rash.   Neurological:      General: No focal deficit present.      Mental Status: She is alert and oriented to person, place, and time. Mental status is at baseline.   Psychiatric:         Behavior: Behavior normal.         Thought Content: Thought content normal.         Judgment: Judgment normal.         Assessment/Plan   Diagnoses and all orders for this visit:    1. Fibromyalgia (Primary)  -     traMADol (ULTRAM) 50 MG tablet; Take 1 tablet by mouth Every 6 (Six) Hours As Needed for Moderate Pain . for pain  Dispense: 120 tablet; Refill: 2    2. Anxiety    3. Mild episode of recurrent major depressive disorder (CMS/HCC)    4. Mixed hyperlipidemia    5. Low folic acid    6. Vitamin D deficiency    7. B12 deficiency      Labs Nov  Keep Cymbalta for stress and pain  Keep Ultram --does help pain from fibro  Cont B12 and folic acid--works  Declines mammo, DEXA, colon cancer screen

## 2021-09-13 DIAGNOSIS — M79.7 FIBROMYALGIA: ICD-10-CM

## 2021-09-13 RX ORDER — TRAMADOL HYDROCHLORIDE 50 MG/1
TABLET ORAL
Qty: 120 TABLET | Refills: 2 | OUTPATIENT
Start: 2021-09-13

## 2021-10-12 ENCOUNTER — OFFICE VISIT (OUTPATIENT)
Dept: FAMILY MEDICINE CLINIC | Facility: CLINIC | Age: 76
End: 2021-10-12

## 2021-10-12 VITALS
DIASTOLIC BLOOD PRESSURE: 80 MMHG | HEIGHT: 64 IN | WEIGHT: 174 LBS | RESPIRATION RATE: 16 BRPM | SYSTOLIC BLOOD PRESSURE: 130 MMHG | OXYGEN SATURATION: 94 % | TEMPERATURE: 97.7 F | BODY MASS INDEX: 29.71 KG/M2 | HEART RATE: 92 BPM

## 2021-10-12 DIAGNOSIS — E78.2 MIXED HYPERLIPIDEMIA: ICD-10-CM

## 2021-10-12 DIAGNOSIS — R73.01 IFG (IMPAIRED FASTING GLUCOSE): ICD-10-CM

## 2021-10-12 DIAGNOSIS — E53.8 LOW FOLIC ACID: ICD-10-CM

## 2021-10-12 DIAGNOSIS — D22.9 ATYPICAL NEVI: ICD-10-CM

## 2021-10-12 DIAGNOSIS — E55.9 VITAMIN D DEFICIENCY: ICD-10-CM

## 2021-10-12 DIAGNOSIS — I48.0 PAF (PAROXYSMAL ATRIAL FIBRILLATION) (HCC): ICD-10-CM

## 2021-10-12 DIAGNOSIS — E53.8 B12 DEFICIENCY: ICD-10-CM

## 2021-10-12 DIAGNOSIS — M79.7 FIBROMYALGIA: ICD-10-CM

## 2021-10-12 DIAGNOSIS — F33.0 MILD EPISODE OF RECURRENT MAJOR DEPRESSIVE DISORDER (HCC): Primary | ICD-10-CM

## 2021-10-12 DIAGNOSIS — F41.9 ANXIETY: ICD-10-CM

## 2021-10-12 DIAGNOSIS — N18.31 STAGE 3A CHRONIC KIDNEY DISEASE (HCC): ICD-10-CM

## 2021-10-12 DIAGNOSIS — D63.8 ANEMIA OF CHRONIC DISEASE: ICD-10-CM

## 2021-10-12 PROCEDURE — 90662 IIV NO PRSV INCREASED AG IM: CPT | Performed by: PHYSICIAN ASSISTANT

## 2021-10-12 PROCEDURE — 99214 OFFICE O/P EST MOD 30 MIN: CPT | Performed by: PHYSICIAN ASSISTANT

## 2021-10-12 PROCEDURE — G0008 ADMIN INFLUENZA VIRUS VAC: HCPCS | Performed by: PHYSICIAN ASSISTANT

## 2021-10-12 RX ORDER — TRAMADOL HYDROCHLORIDE 50 MG/1
50 TABLET ORAL EVERY 6 HOURS PRN
Qty: 120 TABLET | Refills: 2 | Status: SHIPPED | OUTPATIENT
Start: 2021-10-12 | End: 2022-01-06 | Stop reason: SDUPTHER

## 2021-10-12 NOTE — PROGRESS NOTES
Immunization  Immunization performed in LD by Ruben Martínez MA. Patient tolerated the procedure well without complications.  10/12/21   Ruben Martínez MA

## 2021-10-12 NOTE — PROGRESS NOTES
"Subjective   Miguelina Mueller is a 76 y.o. female.     History of Present Illness   Miguelina Mueller female 76 y.o., BP (!) 141/105   Pulse 92   Temp 97.7 °F (36.5 °C) (Skin)   Resp 16   Ht 162.6 cm (64.02\")   Wt 78.9 kg (174 lb)   LMP  (LMP Unknown)   SpO2 94%   BMI 29.85 kg/m²   who presents today for follow up of Depression and Anxiety.  She reports some help with Cymbalta as stated before. Best med of those tried. Onset of symptoms was approximately several years ago.  She denies current suicidal and homicidal ideation. Risk factors are family history of anxiety and or depression, lifestyle of multiple roles and chronic pain.  Previous treatment includes current Rx. She complains of the following medication side effects: none. The patient declines to go to counseling..  Cymbalta also for pain    Plan, Miguelina Mueller, was seen today.  she was seen for Imparied fasting glucose and plan follow up labs, diet, and exercise, Hyperlipidemia and will continue current medication, Atrial Fibrillation and remains on medication for treatment and is stable, Atrial Fibrillation and remains under the care of their cardiologist for medical management and is stable and Vitamin D deficiency and will update labs .  All stable; on Xarelto      Did labs Nov and proved anemia of chronic disease; no NSAIDs  Cont on folic acid and vit   The patient has read and signed the [facility] Controlled Substance Contract.  I will continue to see patient for regular follow up appointments.  They are well controlled on their medication.  NATA has been reviewed by me and is updated every 3 months. The patient is aware of the potential for addiction and dependence.  D/t Xarelto and CKD---cont Ultram for fibro pain and Cymbalta---helps some----stable  Cymbalta helps depression some---best med tried.        just saw cardio-------6=24-21  SUMMARY/DISCUSSION  1. Paroxysmal atrial fibrillation.  Patient currently tolerating medicine well doing " well overall.  2. Hypertension blood pressures great  3. General fatigue is not significantly changed much since I saw her last seems to be very chronic in nature.  4. Follow-up 1 year or sooner if issues.        Right forearm ? Basal cell cancer---see photo;  1 yr      The following portions of the patient's history were reviewed and updated as appropriate: allergies, current medications, past family history, past medical history, past social history, past surgical history and problem list.    Review of Systems   Constitutional: Positive for fatigue. Negative for activity change, appetite change and unexpected weight change.   HENT: Negative for nosebleeds and trouble swallowing.    Eyes: Negative for pain and visual disturbance.   Respiratory: Negative for chest tightness, shortness of breath and wheezing.    Cardiovascular: Negative for chest pain and palpitations.   Gastrointestinal: Negative for abdominal pain and blood in stool.   Endocrine: Negative.    Genitourinary: Negative for difficulty urinating and hematuria.   Musculoskeletal: Positive for arthralgias and myalgias. Negative for joint swelling.   Skin: Negative for color change and rash.   Allergic/Immunologic: Negative.    Neurological: Negative for syncope and speech difficulty.   Hematological: Negative for adenopathy.   Psychiatric/Behavioral: Positive for sleep disturbance. Negative for agitation and confusion.   All other systems reviewed and are negative.      Objective   Physical Exam  Vitals and nursing note reviewed.   Constitutional:       General: She is not in acute distress.     Appearance: She is well-developed. She is not ill-appearing or toxic-appearing.   HENT:      Head: Normocephalic.      Right Ear: External ear normal.      Left Ear: External ear normal.      Nose: Nose normal.      Mouth/Throat:      Pharynx: Oropharynx is clear.   Eyes:      General: No scleral icterus.     Conjunctiva/sclera: Conjunctivae normal.      Pupils:  Pupils are equal, round, and reactive to light.   Neck:      Thyroid: No thyromegaly.   Cardiovascular:      Rate and Rhythm: Normal rate and regular rhythm.      Heart sounds: Normal heart sounds. No murmur heard.      Pulmonary:      Effort: Pulmonary effort is normal. No respiratory distress.      Breath sounds: Normal breath sounds.   Musculoskeletal:         General: No deformity. Normal range of motion.      Cervical back: Normal range of motion and neck supple.   Skin:     General: Skin is warm and dry.      Findings: No rash.   Neurological:      General: No focal deficit present.      Mental Status: She is alert and oriented to person, place, and time. Mental status is at baseline.   Psychiatric:         Mood and Affect: Mood normal.         Behavior: Behavior normal.         Thought Content: Thought content normal.         Judgment: Judgment normal.         Assessment/Plan   Diagnoses and all orders for this visit:    1. Mild episode of recurrent major depressive disorder (HCC) (Primary)  -     Comprehensive metabolic panel  -     Lipid panel  -     CBC and Differential  -     TSH  -     Hemoglobin A1c  -     T3, Free  -     T4, Free  -     Vitamin B12  -     Folate  -     Vitamin D 25 Hydroxy    2. Anxiety  -     Comprehensive metabolic panel  -     Lipid panel  -     CBC and Differential  -     TSH  -     Hemoglobin A1c  -     T3, Free  -     T4, Free  -     Vitamin B12  -     Folate  -     Vitamin D 25 Hydroxy    3. Fibromyalgia  -     Comprehensive metabolic panel  -     Lipid panel  -     CBC and Differential  -     TSH  -     Hemoglobin A1c  -     T3, Free  -     T4, Free  -     Vitamin B12  -     Folate  -     Vitamin D 25 Hydroxy    4. B12 deficiency  -     Comprehensive metabolic panel  -     Lipid panel  -     CBC and Differential  -     TSH  -     Hemoglobin A1c  -     T3, Free  -     T4, Free  -     Vitamin B12  -     Folate  -     Vitamin D 25 Hydroxy    5. Vitamin D deficiency  -      Comprehensive metabolic panel  -     Lipid panel  -     CBC and Differential  -     TSH  -     Hemoglobin A1c  -     T3, Free  -     T4, Free  -     Vitamin B12  -     Folate  -     Vitamin D 25 Hydroxy    6. Anemia of chronic disease  -     Comprehensive metabolic panel  -     Lipid panel  -     CBC and Differential  -     TSH  -     Hemoglobin A1c  -     T3, Free  -     T4, Free  -     Vitamin B12  -     Folate  -     Vitamin D 25 Hydroxy    7. Stage 3a chronic kidney disease (CMS/[facility])  -     Comprehensive metabolic panel  -     Lipid panel  -     CBC and Differential  -     TSH  -     Hemoglobin A1c  -     T3, Free  -     T4, Free  -     Vitamin B12  -     Folate  -     Vitamin D 25 Hydroxy    8. Low folic acid  -     Comprehensive metabolic panel  -     Lipid panel  -     CBC and Differential  -     TSH  -     Hemoglobin A1c  -     T3, Free  -     T4, Free  -     Vitamin B12  -     Folate  -     Vitamin D 25 Hydroxy    9. PAF (paroxysmal atrial fibrillation) ([facility])  -     Comprehensive metabolic panel  -     Lipid panel  -     CBC and Differential  -     TSH  -     Hemoglobin A1c  -     T3, Free  -     T4, Free  -     Vitamin B12  -     Folate  -     Vitamin D 25 Hydroxy    10. Mixed hyperlipidemia  -     Comprehensive metabolic panel  -     Lipid panel  -     CBC and Differential  -     TSH  -     Hemoglobin A1c  -     T3, Free  -     T4, Free  -     Vitamin B12  -     Folate  -     Vitamin D 25 Hydroxy    11. IFG (impaired fasting glucose)  -     Comprehensive metabolic panel  -     Lipid panel  -     CBC and Differential  -     TSH  -     Hemoglobin A1c  -     T3, Free  -     T4, Free  -     Vitamin B12  -     Folate  -     Vitamin D 25 Hydroxy    Other orders  -     Fluzone High-Dose 65+yrs        Refer derm  Plan, Miguelina Mueller, was seen today.  she was seen for Imparied fasting glucose and plan follow up labs, diet, and exercise, Hyperlipidemia and will continue current medication, Atrial Fibrillation and  remains on medication for treatment and is stable, Atrial Fibrillation and remains under the care of their cardiologist for medical management and is stable and Vitamin D deficiency and will update labs .  Also f/u B12 and folate labs---taking  Cont ultram for pain---does help Fibro  Avoid NSAIDs---=watching renal labs

## 2021-10-12 NOTE — PATIENT INSTRUCTIONS

## 2021-11-19 RX ORDER — FOLIC ACID 1 MG/1
TABLET ORAL
Qty: 90 TABLET | Refills: 3 | OUTPATIENT
Start: 2021-11-19

## 2021-11-22 RX ORDER — DILTIAZEM HYDROCHLORIDE 180 MG/1
CAPSULE, EXTENDED RELEASE ORAL
Qty: 30 CAPSULE | Refills: 11 | Status: SHIPPED | OUTPATIENT
Start: 2021-11-22

## 2021-12-08 DIAGNOSIS — M79.7 FIBROMYALGIA: ICD-10-CM

## 2021-12-08 RX ORDER — TRAMADOL HYDROCHLORIDE 50 MG/1
TABLET ORAL
Qty: 120 TABLET | Refills: 2 | OUTPATIENT
Start: 2021-12-08

## 2021-12-27 RX ORDER — RIVAROXABAN 20 MG/1
TABLET, FILM COATED ORAL
Qty: 30 TABLET | Refills: 11 | Status: SHIPPED | OUTPATIENT
Start: 2021-12-27 | End: 2023-01-06

## 2022-01-06 ENCOUNTER — OFFICE VISIT (OUTPATIENT)
Dept: FAMILY MEDICINE CLINIC | Facility: CLINIC | Age: 77
End: 2022-01-06

## 2022-01-06 DIAGNOSIS — F33.0 MILD EPISODE OF RECURRENT MAJOR DEPRESSIVE DISORDER: ICD-10-CM

## 2022-01-06 DIAGNOSIS — E53.8 B12 DEFICIENCY: Primary | ICD-10-CM

## 2022-01-06 DIAGNOSIS — E78.2 MIXED HYPERLIPIDEMIA: ICD-10-CM

## 2022-01-06 DIAGNOSIS — R73.01 IFG (IMPAIRED FASTING GLUCOSE): ICD-10-CM

## 2022-01-06 DIAGNOSIS — M79.7 FIBROMYALGIA: ICD-10-CM

## 2022-01-06 DIAGNOSIS — F41.9 ANXIETY: ICD-10-CM

## 2022-01-06 DIAGNOSIS — E55.9 VITAMIN D DEFICIENCY: ICD-10-CM

## 2022-01-06 DIAGNOSIS — I48.0 PAF (PAROXYSMAL ATRIAL FIBRILLATION): ICD-10-CM

## 2022-01-06 PROCEDURE — 99214 OFFICE O/P EST MOD 30 MIN: CPT | Performed by: PHYSICIAN ASSISTANT

## 2022-01-06 RX ORDER — TRAMADOL HYDROCHLORIDE 50 MG/1
50 TABLET ORAL EVERY 6 HOURS PRN
Qty: 120 TABLET | Refills: 2 | Status: SHIPPED | OUTPATIENT
Start: 2022-01-06 | End: 2022-03-31 | Stop reason: SDUPTHER

## 2022-01-06 NOTE — PROGRESS NOTES
Subjective   Miguelina Mueller is a 76 y.o. female.   You have chosen to receive care through a telehealth visit.  Do you consent to use a video/audio connection for your medical care today? Yes    History of Present Illness    Since the last visit, she has overall felt tired.  She has Impaired fasting glucose and will monitor labs to watch for DMII, Hyperlipidemia with goals met with current Rx, Atrial Fibillation and remains under the care of their cardiologist for management and Vitamin D deficiency and will update labs for continued management.  she has been compliant with current medications have reviewed them.  The patient denies medication side effects.  Will refill medications. LMP  (LMP Unknown)   She is on folic Cymbalta to help with depression and anxiety although she still gets breakthrough depression but this is her best medication.   She also agrees that she will get labs in the next couple of weeks.  Need to follow-up on her impaired glucose.  She is taking the folic acid and B12  will make sure her level is at goal.  Also check kidney and liver functions.  .  BMI Readings from Last 1 Encounters:   10/12/21 29.85 kg/m²       Results for orders placed or performed in visit on 05/05/20   Comprehensive metabolic panel    Specimen: Blood   Result Value Ref Range    Glucose 91 65 - 99 mg/dL    BUN 17 8 - 23 mg/dL    Creatinine 1.21 (H) 0.57 - 1.00 mg/dL    eGFR Non African Am 44 (L) >60 mL/min/1.73    eGFR African Am 53 (L) >60 mL/min/1.73    BUN/Creatinine Ratio 14.0 7.0 - 25.0    Sodium 137 136 - 145 mmol/L    Potassium 4.4 3.5 - 5.2 mmol/L    Chloride 100 98 - 107 mmol/L    Total CO2 27.4 22.0 - 29.0 mmol/L    Calcium 9.3 8.6 - 10.5 mg/dL    Total Protein 6.6 6.0 - 8.5 g/dL    Albumin 4.50 3.50 - 5.20 g/dL    Globulin 2.1 gm/dL    A/G Ratio 2.1 g/dL    Total Bilirubin 0.6 0.0 - 1.2 mg/dL    Alkaline Phosphatase 121 (H) 39 - 117 U/L    AST (SGOT) 20 1 - 32 U/L    ALT (SGPT) 14 1 - 33 U/L   Lipid panel     Specimen: Blood   Result Value Ref Range    Total Cholesterol 194 0 - 200 mg/dL    Triglycerides 135 0 - 150 mg/dL    HDL Cholesterol 72 (H) 40 - 60 mg/dL    VLDL Cholesterol Aidan 23 5 - 40 mg/dL    LDL Chol Calc (NIH) 99 0 - 100 mg/dL   TSH    Specimen: Blood   Result Value Ref Range    TSH 4.470 (H) 0.270 - 4.200 uIU/mL   Hemoglobin A1c    Specimen: Blood   Result Value Ref Range    Hemoglobin A1C 5.57 4.80 - 5.60 %   T3, Free    Specimen: Blood   Result Value Ref Range    T3, Free 2.9 2.0 - 4.4 pg/mL   T4, Free    Specimen: Blood   Result Value Ref Range    Free T4 1.26 0.93 - 1.70 ng/dL   Vitamin D 25 Hydroxy    Specimen: Blood   Result Value Ref Range    25 Hydroxy, Vitamin D 46.7 30.0 - 100.0 ng/ml   Vitamin B12    Specimen: Blood   Result Value Ref Range    Vitamin B-12 1,379 (H) 211 - 946 pg/mL   Folate    Specimen: Blood   Result Value Ref Range    Folate >20.00 4.78 - 24.20 ng/mL   Iron Profile   Result Value Ref Range    TIBC 421 mcg/dL    UIBC 324 112 - 346 mcg/dL    Iron 97 37 - 145 mcg/dL    Iron Saturation 23 20 - 50 %   Ferritin   Result Value Ref Range    Ferritin 169.00 (H) 13.00 - 150.00 ng/mL   Please Note   Result Value Ref Range    Please note Comment    Written Authorization   Result Value Ref Range    Written Authorization Comment    CBC and Differential    Specimen: Blood   Result Value Ref Range    WBC 11.25 (H) 3.40 - 10.80 10*3/mm3    RBC 5.29 (H) 3.77 - 5.28 10*6/mm3    Hemoglobin 16.4 (H) 12.0 - 15.9 g/dL    Hematocrit 49.9 (H) 34.0 - 46.6 %    MCV 94.3 79.0 - 97.0 fL    MCH 31.0 26.6 - 33.0 pg    MCHC 32.9 31.5 - 35.7 g/dL    RDW 13.1 12.3 - 15.4 %    Platelets 218 140 - 450 10*3/mm3    Neutrophil Rel % 60.9 42.7 - 76.0 %    Lymphocyte Rel % 27.4 19.6 - 45.3 %    Monocyte Rel % 6.6 5.0 - 12.0 %    Eosinophil Rel % 3.6 0.3 - 6.2 %    Basophil Rel % 1.0 0.0 - 1.5 %    Neutrophils Absolute 6.85 1.70 - 7.00 10*3/mm3    Lymphocytes Absolute 3.08 0.70 - 3.10 10*3/mm3    Monocytes Absolute  0.74 0.10 - 0.90 10*3/mm3    Eosinophils Absolute 0.41 (H) 0.00 - 0.40 10*3/mm3    Basophils Absolute 0.11 0.00 - 0.20 10*3/mm3    Immature Granulocyte Rel % 0.5 0.0 - 0.5 %    Immature Grans Absolute 0.06 (H) 0.00 - 0.05 10*3/mm3    nRBC 0.0 0.0 - 0.2 /100 WBC     The patient has read and signed the [facility] Controlled Substance Contract.  I will continue to see patient for regular follow up appointments.  They are well controlled on their medication.  NATA has been reviewed by me and is updated every 3 months. The patient is aware of the potential for addiction and dependence.  Ultram is effective in helping her fibromyalgia pain.  I want her to stay away from NSAIDs due to her renal functions.  She would like me to send refills to her pharmacy today  Saw cardio 6-24-21 Dr Dover---a fib  Stable; on Xarelto---f/u 1 yr    The following portions of the patient's history were reviewed and updated as appropriate: allergies, current medications, past family history, past medical history, past social history, past surgical history and problem list.    Review of Systems   Constitutional: Positive for fatigue. Negative for activity change, appetite change and unexpected weight change.   HENT: Negative for nosebleeds and trouble swallowing.    Eyes: Negative for pain and visual disturbance.   Respiratory: Negative for chest tightness, shortness of breath and wheezing.    Cardiovascular: Negative for chest pain and palpitations.   Gastrointestinal: Negative for abdominal pain and blood in stool.   Endocrine: Negative.    Genitourinary: Negative for difficulty urinating and hematuria.   Musculoskeletal: Positive for arthralgias and myalgias. Negative for joint swelling.   Skin: Negative for color change and rash.   Allergic/Immunologic: Negative.    Neurological: Negative for syncope and speech difficulty.   Hematological: Negative for adenopathy.   Psychiatric/Behavioral: Positive for dysphoric mood and sleep  disturbance. Negative for agitation and confusion.   All other systems reviewed and are negative.      Objective   Physical Exam  Constitutional:       General: She is not in acute distress.     Appearance: She is well-developed. She is not diaphoretic.   HENT:      Head: Normocephalic and atraumatic.      Right Ear: External ear normal.      Left Ear: External ear normal.   Eyes:      General: No scleral icterus.     Conjunctiva/sclera: Conjunctivae normal.   Pulmonary:      Effort: Pulmonary effort is normal. No respiratory distress.      Breath sounds: No stridor.   Musculoskeletal:         General: No deformity. Normal range of motion.      Cervical back: Normal range of motion.   Skin:     General: Skin is dry.      Coloration: Skin is not jaundiced.   Neurological:      General: No focal deficit present.      Mental Status: She is alert and oriented to person, place, and time.      Coordination: Coordination normal.   Psychiatric:         Mood and Affect: Mood normal.         Behavior: Behavior normal.         Thought Content: Thought content normal.         Judgment: Judgment normal.         Assessment/Plan   Diagnoses and all orders for this visit:    1. Fibromyalgia  -     traMADol (ULTRAM) 50 MG tablet; Take 1 tablet by mouth Every 6 (Six) Hours As Needed for Moderate Pain . for pain  Dispense: 120 tablet; Refill: 2      Cont B12 and folate  Ultram does help Fibro pain  Cymbalta helps her depression some------best med  Plan, Miguelina Mueller, was seen today.  she was seen for Imparied fasting glucose and plan follow up labs, diet, and exercise, Hyperlipidemia and will continue current medication, Atrial Fibrillation and remains on medication for treatment and is stable, Atrial Fibrillation and remains under the care of their cardiologist for medical management and is stable and Vitamin D deficiency and will update labs .  Time 15 min

## 2022-01-31 RX ORDER — DULOXETIN HYDROCHLORIDE 60 MG/1
CAPSULE, DELAYED RELEASE ORAL
Qty: 30 CAPSULE | Refills: 11 | Status: SHIPPED | OUTPATIENT
Start: 2022-01-31 | End: 2022-09-14 | Stop reason: SDUPTHER

## 2022-03-05 DIAGNOSIS — M79.7 FIBROMYALGIA: ICD-10-CM

## 2022-03-06 RX ORDER — TRAMADOL HYDROCHLORIDE 50 MG/1
TABLET ORAL
Qty: 120 TABLET | Refills: 2 | OUTPATIENT
Start: 2022-03-06

## 2022-03-07 RX ORDER — PRAVASTATIN SODIUM 80 MG/1
TABLET ORAL
Qty: 30 TABLET | Refills: 11 | Status: SHIPPED | OUTPATIENT
Start: 2022-03-07 | End: 2023-03-09 | Stop reason: SDUPTHER

## 2022-03-07 RX ORDER — MAGNESIUM 200 MG
TABLET ORAL
Qty: 90 EACH | Refills: 3 | Status: SHIPPED | OUTPATIENT
Start: 2022-03-07 | End: 2023-03-16

## 2022-03-31 ENCOUNTER — OFFICE VISIT (OUTPATIENT)
Dept: FAMILY MEDICINE CLINIC | Facility: CLINIC | Age: 77
End: 2022-03-31

## 2022-03-31 VITALS — HEIGHT: 64 IN | WEIGHT: 180 LBS | BODY MASS INDEX: 30.73 KG/M2

## 2022-03-31 DIAGNOSIS — F33.0 MILD EPISODE OF RECURRENT MAJOR DEPRESSIVE DISORDER: ICD-10-CM

## 2022-03-31 DIAGNOSIS — M79.7 FIBROMYALGIA: ICD-10-CM

## 2022-03-31 DIAGNOSIS — F41.9 ANXIETY: Primary | ICD-10-CM

## 2022-03-31 DIAGNOSIS — R06.02 SHORTNESS OF BREATH: ICD-10-CM

## 2022-03-31 PROCEDURE — 99442 PR PHYS/QHP TELEPHONE EVALUATION 11-20 MIN: CPT | Performed by: PHYSICIAN ASSISTANT

## 2022-03-31 RX ORDER — TRAMADOL HYDROCHLORIDE 50 MG/1
50 TABLET ORAL EVERY 6 HOURS PRN
Qty: 120 TABLET | Refills: 2 | Status: SHIPPED | OUTPATIENT
Start: 2022-03-31 | End: 2022-06-21 | Stop reason: SDUPTHER

## 2022-03-31 NOTE — PROGRESS NOTES
"Subjective   Miguelina Mueller is a 76 y.o. female.   .  BMI Readings from Last 1 Encounters:   03/31/22 30.90 kg/m²     You have chosen to receive care through a telehealth visit.  Do you consent to use a video/audio connection for your medical care today? Yes  Connected by phone  History of Present Illness    Since the last visit, she has overall felt tired.  She has Hyperlipidemia with goals met with current Rx, Atrial Fibillation and remains under the care of their cardiologist for management and Vitamin D deficiency and will update labs for continued management.  she has been compliant with current medications have reviewed them.  The patient denies medication side effects.  Will refill medications. Ht 162.6 cm (64\")   Wt 81.6 kg (180 lb)   LMP  (LMP Unknown)   BMI 30.90 kg/m²   She is on  Cymbalta to help with depression and anxiety although she still gets breakthrough depression but this is her best medication.   She also agrees that she will get labs in the   Results for orders placed or performed in visit on 05/05/20   Comprehensive metabolic panel    Specimen: Blood   Result Value Ref Range    Glucose 91 65 - 99 mg/dL    BUN 17 8 - 23 mg/dL    Creatinine 1.21 (H) 0.57 - 1.00 mg/dL    eGFR Non African Am 44 (L) >60 mL/min/1.73    eGFR African Am 53 (L) >60 mL/min/1.73    BUN/Creatinine Ratio 14.0 7.0 - 25.0    Sodium 137 136 - 145 mmol/L    Potassium 4.4 3.5 - 5.2 mmol/L    Chloride 100 98 - 107 mmol/L    Total CO2 27.4 22.0 - 29.0 mmol/L    Calcium 9.3 8.6 - 10.5 mg/dL    Total Protein 6.6 6.0 - 8.5 g/dL    Albumin 4.50 3.50 - 5.20 g/dL    Globulin 2.1 gm/dL    A/G Ratio 2.1 g/dL    Total Bilirubin 0.6 0.0 - 1.2 mg/dL    Alkaline Phosphatase 121 (H) 39 - 117 U/L    AST (SGOT) 20 1 - 32 U/L    ALT (SGPT) 14 1 - 33 U/L   Lipid panel    Specimen: Blood   Result Value Ref Range    Total Cholesterol 194 0 - 200 mg/dL    Triglycerides 135 0 - 150 mg/dL    HDL Cholesterol 72 (H) 40 - 60 mg/dL    VLDL Cholesterol " Aidan 23 5 - 40 mg/dL    LDL Chol Calc (NIH) 99 0 - 100 mg/dL   TSH    Specimen: Blood   Result Value Ref Range    TSH 4.470 (H) 0.270 - 4.200 uIU/mL   Hemoglobin A1c    Specimen: Blood   Result Value Ref Range    Hemoglobin A1C 5.57 4.80 - 5.60 %   T3, Free    Specimen: Blood   Result Value Ref Range    T3, Free 2.9 2.0 - 4.4 pg/mL   T4, Free    Specimen: Blood   Result Value Ref Range    Free T4 1.26 0.93 - 1.70 ng/dL   Vitamin D 25 Hydroxy    Specimen: Blood   Result Value Ref Range    25 Hydroxy, Vitamin D 46.7 30.0 - 100.0 ng/ml   Vitamin B12    Specimen: Blood   Result Value Ref Range    Vitamin B-12 1,379 (H) 211 - 946 pg/mL   Folate    Specimen: Blood   Result Value Ref Range    Folate >20.00 4.78 - 24.20 ng/mL   Iron Profile   Result Value Ref Range    TIBC 421 mcg/dL    UIBC 324 112 - 346 mcg/dL    Iron 97 37 - 145 mcg/dL    Iron Saturation 23 20 - 50 %   Ferritin   Result Value Ref Range    Ferritin 169.00 (H) 13.00 - 150.00 ng/mL   Please Note   Result Value Ref Range    Please note Comment    Written Authorization   Result Value Ref Range    Written Authorization Comment    CBC and Differential    Specimen: Blood   Result Value Ref Range    WBC 11.25 (H) 3.40 - 10.80 10*3/mm3    RBC 5.29 (H) 3.77 - 5.28 10*6/mm3    Hemoglobin 16.4 (H) 12.0 - 15.9 g/dL    Hematocrit 49.9 (H) 34.0 - 46.6 %    MCV 94.3 79.0 - 97.0 fL    MCH 31.0 26.6 - 33.0 pg    MCHC 32.9 31.5 - 35.7 g/dL    RDW 13.1 12.3 - 15.4 %    Platelets 218 140 - 450 10*3/mm3    Neutrophil Rel % 60.9 42.7 - 76.0 %    Lymphocyte Rel % 27.4 19.6 - 45.3 %    Monocyte Rel % 6.6 5.0 - 12.0 %    Eosinophil Rel % 3.6 0.3 - 6.2 %    Basophil Rel % 1.0 0.0 - 1.5 %    Neutrophils Absolute 6.85 1.70 - 7.00 10*3/mm3    Lymphocytes Absolute 3.08 0.70 - 3.10 10*3/mm3    Monocytes Absolute 0.74 0.10 - 0.90 10*3/mm3    Eosinophils Absolute 0.41 (H) 0.00 - 0.40 10*3/mm3    Basophils Absolute 0.11 0.00 - 0.20 10*3/mm3    Immature Granulocyte Rel % 0.5 0.0 - 0.5 %     Immature Grans Absolute 0.06 (H) 0.00 - 0.05 10*3/mm3    nRBC 0.0 0.0 - 0.2 /100 WBC     Hx a fib and cardio appt June  She continues to complain of shortness of breath and worse with exertion.  She is very limited in her activities now with activities of daily living.  She does not even want to go out side of her home just because of the tiredness and shortness of breath.  I agree some of this could be the fibromyalgia but want her to follow-up with cardiology and get some imaging of her lung get a CT of the chest and have her see a pulmonologist.  She has had pulmonary function testing in the remote past that was negative by report.  The patient has read and signed the [facility] Controlled Substance Contract.  I will continue to see patient for regular follow up appointments.  They are well controlled on their medication.  NATA has been reviewed by me and is updated every 3 months. The patient is aware of the potential for addiction and dependence.  Ultram is effective in helping her fibromyalgia pain.  I want her to stay away from NSAIDs due to her renal functions.  She would like me to send refills to her pharmacy today    The following portions of the patient's history were reviewed and updated as appropriate: allergies, current medications, past family history, past medical history, past social history, past surgical history and problem list.    Review of Systems   Constitutional: Negative for fever.   HENT: Negative for nosebleeds and trouble swallowing.    Eyes: Negative for visual disturbance.   Respiratory: Negative for choking and stridor.    Cardiovascular: Negative for chest pain.   Gastrointestinal: Negative for blood in stool.   Endocrine: Negative for polydipsia.   Genitourinary: Negative for genital sores and hematuria.   Musculoskeletal: Negative for joint swelling.   Skin: Negative for color change and rash.   Allergic/Immunologic: Negative for immunocompromised state.   Neurological:  Negative for seizures, facial asymmetry and speech difficulty.   Hematological: Negative for adenopathy.   Psychiatric/Behavioral: Negative for behavioral problems, self-injury and suicidal ideas.       Objective   Physical Exam  Constitutional:       General: She is not in acute distress.     Appearance: She is well-developed. She is not diaphoretic.   HENT:      Head: Normocephalic and atraumatic.   Eyes:      Conjunctiva/sclera: Conjunctivae normal.   Pulmonary:      Effort: Pulmonary effort is normal. No respiratory distress.   Musculoskeletal:         General: Normal range of motion.      Cervical back: Normal range of motion.   Skin:     General: Skin is dry.   Neurological:      Mental Status: She is alert and oriented to person, place, and time.      Coordination: Coordination normal.   Psychiatric:         Behavior: Behavior normal.         Thought Content: Thought content normal.         Judgment: Judgment normal.         Assessment/Plan   Diagnoses and all orders for this visit:    1. Anxiety (Primary)    2. Fibromyalgia  -     traMADol (ULTRAM) 50 MG tablet; Take 1 tablet by mouth Every 6 (Six) Hours As Needed for Moderate Pain . for pain  Dispense: 120 tablet; Refill: 2    3. Mild episode of recurrent major depressive disorder (HCC)    4. Shortness of breath  -     CT Chest Without Contrast Diagnostic; Future  -     Ambulatory Referral to Pulmonology        Needs labs  Plan, Miguelina Mueller, was seen today.  she was seen for Imparied fasting glucose and plan follow up labs, diet, and exercise, Hyperlipidemia and will continue current medication, Atrial Fibrillation and remains on medication for treatment and is stable, Atrial Fibrillation and remains under the care of their cardiologist for medical management and is stable and Vitamin D deficiency and will update labs .  Refill Ultram for Fibro pain and helps  Still SOA and suggest seeing pulm.  Also to see cardio  Also watching CKD----avoid  NSAIDs  Telephone time 12 minutes

## 2022-05-27 DIAGNOSIS — M79.7 FIBROMYALGIA: ICD-10-CM

## 2022-05-27 RX ORDER — TRAMADOL HYDROCHLORIDE 50 MG/1
TABLET ORAL
Qty: 120 TABLET | Refills: 2 | OUTPATIENT
Start: 2022-05-27

## 2022-06-21 ENCOUNTER — TELEMEDICINE (OUTPATIENT)
Dept: FAMILY MEDICINE CLINIC | Facility: CLINIC | Age: 77
End: 2022-06-21

## 2022-06-21 DIAGNOSIS — E78.2 MIXED HYPERLIPIDEMIA: ICD-10-CM

## 2022-06-21 DIAGNOSIS — M79.7 FIBROMYALGIA: Primary | ICD-10-CM

## 2022-06-21 DIAGNOSIS — I48.0 PAF (PAROXYSMAL ATRIAL FIBRILLATION): ICD-10-CM

## 2022-06-21 DIAGNOSIS — N18.31 STAGE 3A CHRONIC KIDNEY DISEASE: ICD-10-CM

## 2022-06-21 DIAGNOSIS — F41.9 ANXIETY: ICD-10-CM

## 2022-06-21 DIAGNOSIS — D63.8 ANEMIA OF CHRONIC DISEASE: ICD-10-CM

## 2022-06-21 DIAGNOSIS — F33.0 MILD EPISODE OF RECURRENT MAJOR DEPRESSIVE DISORDER: ICD-10-CM

## 2022-06-21 DIAGNOSIS — I10 BENIGN ESSENTIAL HTN: ICD-10-CM

## 2022-06-21 DIAGNOSIS — E53.8 B12 DEFICIENCY: ICD-10-CM

## 2022-06-21 DIAGNOSIS — R73.01 IFG (IMPAIRED FASTING GLUCOSE): ICD-10-CM

## 2022-06-21 PROCEDURE — 99442 PR PHYS/QHP TELEPHONE EVALUATION 11-20 MIN: CPT | Performed by: PHYSICIAN ASSISTANT

## 2022-06-21 RX ORDER — TRAMADOL HYDROCHLORIDE 50 MG/1
50 TABLET ORAL EVERY 6 HOURS PRN
Qty: 120 TABLET | Refills: 2 | Status: SHIPPED | OUTPATIENT
Start: 2022-06-21 | End: 2022-09-14 | Stop reason: SDUPTHER

## 2022-06-21 RX ORDER — FOLIC ACID 1 MG/1
1000 TABLET ORAL DAILY
Qty: 90 TABLET | Refills: 3 | Status: SHIPPED | OUTPATIENT
Start: 2022-06-21

## 2022-06-21 NOTE — PROGRESS NOTES
"Subjective   Miguelina Mueller is a 77 y.o. female.   You have chosen to receive care through a telehealth visit.  Do you consent to use a video/audio connection for your medical care today? Yes--contacted today by phone    BMI Readings from Last 1 Encounters:   03/31/22 30.90 kg/m²       History of Present Illness    Since the last visit, she has overall felt tired.  She has Primary Hypertension and well controlled on current medication, Atrial Fibillation and remains under the care of their cardiologist for management and Vitamin D deficiency and will update labs for continued management.  she has been compliant with current medications have reviewed them.  The patient denies medication side effects.  Will refill medications. LMP  (LMP Unknown)   Having more SOA; to see cardio  Do need to get labs  Continues on Cymbalta for anxiety and depression as above noted on past progress notes medication does help but still has breakthrough symptoms and declines referral to psychiatry and will continue same dose.  The patient has read and signed the [facility] Controlled Substance Contract.  I will continue to see patient for regular follow up appointments.  They are well controlled on their medication.  NATA has been reviewed by me and is updated every 3 months. The patient is aware of the potential for addiction and dependence.  Ultram is still her \"life saver\" for her fibromyalgia pain we will send refill to pharmacy  Results for orders placed or performed in visit on 05/05/20   Comprehensive metabolic panel    Specimen: Blood   Result Value Ref Range    Glucose 91 65 - 99 mg/dL    BUN 17 8 - 23 mg/dL    Creatinine 1.21 (H) 0.57 - 1.00 mg/dL    eGFR Non African Am 44 (L) >60 mL/min/1.73    eGFR African Am 53 (L) >60 mL/min/1.73    BUN/Creatinine Ratio 14.0 7.0 - 25.0    Sodium 137 136 - 145 mmol/L    Potassium 4.4 3.5 - 5.2 mmol/L    Chloride 100 98 - 107 mmol/L    Total CO2 27.4 22.0 - 29.0 mmol/L    Calcium 9.3 8.6 - " 10.5 mg/dL    Total Protein 6.6 6.0 - 8.5 g/dL    Albumin 4.50 3.50 - 5.20 g/dL    Globulin 2.1 gm/dL    A/G Ratio 2.1 g/dL    Total Bilirubin 0.6 0.0 - 1.2 mg/dL    Alkaline Phosphatase 121 (H) 39 - 117 U/L    AST (SGOT) 20 1 - 32 U/L    ALT (SGPT) 14 1 - 33 U/L   Lipid panel    Specimen: Blood   Result Value Ref Range    Total Cholesterol 194 0 - 200 mg/dL    Triglycerides 135 0 - 150 mg/dL    HDL Cholesterol 72 (H) 40 - 60 mg/dL    VLDL Cholesterol Aidan 23 5 - 40 mg/dL    LDL Chol Calc (NIH) 99 0 - 100 mg/dL   TSH    Specimen: Blood   Result Value Ref Range    TSH 4.470 (H) 0.270 - 4.200 uIU/mL   Hemoglobin A1c    Specimen: Blood   Result Value Ref Range    Hemoglobin A1C 5.57 4.80 - 5.60 %   T3, Free    Specimen: Blood   Result Value Ref Range    T3, Free 2.9 2.0 - 4.4 pg/mL   T4, Free    Specimen: Blood   Result Value Ref Range    Free T4 1.26 0.93 - 1.70 ng/dL   Vitamin D 25 Hydroxy    Specimen: Blood   Result Value Ref Range    25 Hydroxy, Vitamin D 46.7 30.0 - 100.0 ng/ml   Vitamin B12    Specimen: Blood   Result Value Ref Range    Vitamin B-12 1,379 (H) 211 - 946 pg/mL   Folate    Specimen: Blood   Result Value Ref Range    Folate >20.00 4.78 - 24.20 ng/mL   Iron Profile   Result Value Ref Range    TIBC 421 mcg/dL    UIBC 324 112 - 346 mcg/dL    Iron 97 37 - 145 mcg/dL    Iron Saturation 23 20 - 50 %   Ferritin   Result Value Ref Range    Ferritin 169.00 (H) 13.00 - 150.00 ng/mL   Please Note   Result Value Ref Range    Please note Comment    Written Authorization   Result Value Ref Range    Written Authorization Comment    CBC and Differential    Specimen: Blood   Result Value Ref Range    WBC 11.25 (H) 3.40 - 10.80 10*3/mm3    RBC 5.29 (H) 3.77 - 5.28 10*6/mm3    Hemoglobin 16.4 (H) 12.0 - 15.9 g/dL    Hematocrit 49.9 (H) 34.0 - 46.6 %    MCV 94.3 79.0 - 97.0 fL    MCH 31.0 26.6 - 33.0 pg    MCHC 32.9 31.5 - 35.7 g/dL    RDW 13.1 12.3 - 15.4 %    Platelets 218 140 - 450 10*3/mm3    Neutrophil Rel % 60.9  42.7 - 76.0 %    Lymphocyte Rel % 27.4 19.6 - 45.3 %    Monocyte Rel % 6.6 5.0 - 12.0 %    Eosinophil Rel % 3.6 0.3 - 6.2 %    Basophil Rel % 1.0 0.0 - 1.5 %    Neutrophils Absolute 6.85 1.70 - 7.00 10*3/mm3    Lymphocytes Absolute 3.08 0.70 - 3.10 10*3/mm3    Monocytes Absolute 0.74 0.10 - 0.90 10*3/mm3    Eosinophils Absolute 0.41 (H) 0.00 - 0.40 10*3/mm3    Basophils Absolute 0.11 0.00 - 0.20 10*3/mm3    Immature Granulocyte Rel % 0.5 0.0 - 0.5 %    Immature Grans Absolute 0.06 (H) 0.00 - 0.05 10*3/mm3    nRBC 0.0 0.0 - 0.2 /100 WBC     Need to update renal labs due to chronic kidney disease stage III and anemia of chronic disease    The following portions of the patient's history were reviewed and updated as appropriate: allergies, current medications, past family history, past medical history, past social history, past surgical history and problem list.    Review of Systems   Constitutional: Positive for activity change and appetite change. Negative for fever and unexpected weight change.   HENT: Negative for nosebleeds and trouble swallowing.    Eyes: Negative for pain and visual disturbance.   Respiratory: Positive for shortness of breath. Negative for choking, chest tightness, wheezing and stridor.    Cardiovascular: Negative for chest pain and palpitations.   Gastrointestinal: Negative for abdominal pain and blood in stool.   Endocrine: Negative.  Negative for polydipsia.   Genitourinary: Negative for difficulty urinating, genital sores and hematuria.   Musculoskeletal: Negative for joint swelling.   Skin: Negative for color change and rash.   Allergic/Immunologic: Negative.  Negative for immunocompromised state.   Neurological: Negative for seizures, syncope, facial asymmetry and speech difficulty.   Hematological: Negative for adenopathy.   Psychiatric/Behavioral: Positive for sleep disturbance. Negative for agitation, behavioral problems, confusion, self-injury and suicidal ideas.   All other systems  reviewed and are negative.      Objective   Physical Exam  Pulmonary:      Effort: No respiratory distress.   Neurological:      General: No focal deficit present.      Mental Status: She is alert.   Psychiatric:         Behavior: Behavior normal.         Thought Content: Thought content normal.         Judgment: Judgment normal.         Assessment & Plan   Diagnoses and all orders for this visit:    1. Fibromyalgia (Primary)  -     traMADol (ULTRAM) 50 MG tablet; Take 1 tablet by mouth Every 6 (Six) Hours As Needed for Moderate Pain . for pain  Dispense: 120 tablet; Refill: 2    2. Mild episode of recurrent major depressive disorder ([facility])    3. Anxiety    4. Mixed hyperlipidemia    5. Anemia of chronic disease    6. B12 deficiency    7. PAF (paroxysmal atrial fibrillation) ([facility])    8. Stage 3a chronic kidney disease (CMS/[facility])    9. IFG (impaired fasting glucose)    Other orders  -     folic acid (FOLVITE) 1 MG tablet; Take 1 tablet by mouth Daily.  Dispense: 90 tablet; Refill: 3      Get labs--- consider getting a rolling walker to ambulate  Continue Cymbalta for fibromyalgia and anxiety and depression noting it helps some  Continue tramadol and does help her fibromyalgia pain and tolerates well   continue B12 and folic acid and updating those labs along with vitamin D  Need to update renal labs due to chronic kidney disease stage III and anemia of chronic disease    See cardiology for A. fib and shortness of breath complaints  Blood pressure controlled with heart medication--cont.  Anticoagulated per cardiology for A. fib to prevent stroke  Plan, Miguelina Arayajuana, was seen today.  she was seen for HTN and continue medication, Hyperlipidemia and will continue current medication, Atrial Fibrillation and remains on medication for treatment and is stable, Atrial Fibrillation and remains under the care of their cardiologist for medical management and is stable and Vitamin D deficiency and will update labs .  Phone time   11 min

## 2022-06-21 NOTE — PATIENT INSTRUCTIONS
Fall Prevention in the Home, Adult  Falls can cause injuries and can affect people from all age groups. There are many simple things that you can do to make your home safe and to help prevent falls. Ask for help when making these changes, if needed.  What actions can I take to prevent falls?  General instructions  Use good lighting in all rooms. Replace any light bulbs that burn out.  Turn on lights if it is dark. Use night-lights.  Place frequently used items in easy-to-reach places. Lower the shelves around your home if necessary.  Set up furniture so that there are clear paths around it. Avoid moving your furniture around.  Remove throw rugs and other tripping hazards from the floor.  Avoid walking on wet floors.  Fix any uneven floor surfaces.  Add color or contrast paint or tape to grab bars and handrails in your home. Place contrasting color strips on the first and last steps of stairways.  When you use a stepladder, make sure that it is completely opened and that the sides are firmly locked. Have someone hold the ladder while you are using it. Do not climb a closed stepladder.  Be aware of any and all pets.  What can I do in the bathroom?         Keep the floor dry. Immediately clean up any water that spills onto the floor.  Remove soap buildup in the tub or shower on a regular basis.  Use non-skid mats or decals on the floor of the tub or shower.  Attach bath mats securely with double-sided, non-slip rug tape.  If you need to sit down while you are in the shower, use a plastic, non-slip stool.  Install grab bars by the toilet and in the tub and shower. Do not use towel bars as grab bars.  What can I do in the bedroom?  Make sure that a bedside light is easy to reach.  Do not use oversized bedding that drapes onto the floor.  Have a firm chair that has side arms to use for getting dressed.  What can I do in the kitchen?  Clean up any spills right away.  If you need to reach for something above you, use a  sturdy step stool that has a grab bar.  Keep electrical cables out of the way.  Do not use floor polish or wax that makes floors slippery. If you must use wax, make sure that it is non-skid floor wax.  What can I do in the stairways?  Do not leave any items on the stairs.  Make sure that you have a light switch at the top of the stairs and the bottom of the stairs. Have them installed if you do not have them.  Make sure that there are handrails on both sides of the stairs. Fix handrails that are broken or loose. Make sure that handrails are as long as the stairways.  Install non-slip stair treads on all stairs in your home.  Avoid having throw rugs at the top or bottom of stairways, or secure the rugs with carpet tape to prevent them from moving.  Choose a carpet design that does not hide the edge of steps on the stairway.  Check any carpeting to make sure that it is firmly attached to the stairs. Fix any carpet that is loose or worn.  What can I do on the outside of my home?  Use bright outdoor lighting.  Regularly repair the edges of walkways and driveways and fix any cracks.  Remove high doorway thresholds.  Trim any shrubbery on the main path into your home.  Regularly check that handrails are securely fastened and in good repair. Both sides of any steps should have handrails.  Install guardrails along the edges of any raised decks or porches.  Clear walkways of debris and clutter, including tools and rocks.  Have leaves, snow, and ice cleared regularly.  Use sand or salt on walkways during winter months.  In the garage, clean up any spills right away, including grease or oil spills.  What other actions can I take?  Wear closed-toe shoes that fit well and support your feet. Wear shoes that have rubber soles or low heels.  Use mobility aids as needed, such as canes, walkers, scooters, and crutches.  Review your medicines with your health care provider. Some medicines can cause dizziness or changes in blood  pressure, which increase your risk of falling.  Talk with your health care provider about other ways that you can decrease your risk of falls. This may include working with a physical therapist or  to improve your strength, balance, and endurance.  Where to find more information  Centers for Disease Control and PreventionARIANE: https://www.cdc.gov  National [place] on Aging: https://lf1fjej.angi.nih.gov  Contact a health care provider if:  You are afraid of falling at home.  You feel weak, drowsy, or dizzy at home.  You fall at home.  Summary  There are many simple things that you can do to make your home safe and to help prevent falls.  Ways to make your home safe include removing tripping hazards and installing grab bars in the bathroom.  Ask for help when making these changes in your home.  This information is not intended to replace advice given to you by your health care provider. Make sure you discuss any questions you have with your health care provider.  Document Revised: 11/30/2018 Document Reviewed: 08/02/2018  [employer]vier Patient Education © 2021 [employer]

## 2022-06-29 NOTE — PATIENT INSTRUCTIONS
Exercising to Lose Weight  Exercising can help you to lose weight. In order to lose weight through exercise, you need to do vigorous-intensity exercise. You can tell that you are exercising with vigorous intensity if you are breathing very hard and fast and cannot hold a conversation while exercising.  Moderate-intensity exercise helps to maintain your current weight. You can tell that you are exercising at a moderate level if you have a higher heart rate and faster breathing, but you are still able to hold a conversation.  How often should I exercise?  Choose an activity that you enjoy and set realistic goals. Your health care provider can help you to make an activity plan that works for you. Exercise regularly as directed by your health care provider. This may include:  · Doing resistance training twice each week, such as:  ? Push-ups.  ? Sit-ups.  ? Lifting weights.  ? Using resistance bands.  · Doing a given intensity of exercise for a given amount of time. Choose from these options:  ? 150 minutes of moderate-intensity exercise every week.  ? 75 minutes of vigorous-intensity exercise every week.  ? A mix of moderate-intensity and vigorous-intensity exercise every week.    Children, pregnant women, people who are out of shape, people who are overweight, and older adults may need to consult a health care provider for individual recommendations. If you have any sort of medical condition, be sure to consult your health care provider before starting a new exercise program.  What are some activities that can help me to lose weight?  · Walking at a rate of at least 4.5 miles an hour.  · Jogging or running at a rate of 5 miles per hour.  · Biking at a rate of at least 10 miles per hour.  · Lap swimming.  · Roller-skating or in-line skating.  · Cross-country skiing.  · Vigorous competitive sports, such as football, basketball, and soccer.  · Jumping rope.  · Aerobic dancing.  How can I be more active in my  day-to-day activities?  · Use the stairs instead of the elevator.  · Take a walk during your lunch break.  · If you drive, park your car farther away from work or school.  · If you take public transportation, get off one stop early and walk the rest of the way.  · Make all of your phone calls while standing up and walking around.  · Get up, stretch, and walk around every 30 minutes throughout the day.  What guidelines should I follow while exercising?  · Do not exercise so much that you hurt yourself, feel dizzy, or get very short of breath.  · Consult your health care provider prior to starting a new exercise program.  · Wear comfortable clothes and shoes with good support.  · Drink plenty of water while you exercise to prevent dehydration or heat stroke. Body water is lost during exercise and must be replaced.  · Work out until you breathe faster and your heart beats faster.  This information is not intended to replace advice given to you by your health care provider. Make sure you discuss any questions you have with your health care provider.  Document Released: 01/20/2012 Document Revised: 05/25/2017 Document Reviewed: 05/21/2015  [employer] Interactive Patient Education © 2018 [employer]    Fall Prevention in the Home  Falls can cause injuries and can affect people from all age groups. There are many simple things that you can do to make your home safe and to help prevent falls.  What can I do on the outside of my home?  · Regularly repair the edges of walkways and driveways and fix any cracks.  · Remove high doorway thresholds.  · Trim any shrubbery on the main path into your home.  · Use bright outdoor lighting.  · Clear walkways of debris and clutter, including tools and rocks.  · Regularly check that handrails are securely fastened and in good repair. Both sides of any steps should have handrails.  · Install guardrails along the edges of any raised decks or porches.  · Have leaves, snow, and ice cleared  regularly.  · Use sand or salt on walkways during winter months.  · In the garage, clean up any spills right away, including grease or oil spills.  What can I do in the bathroom?  · Use night lights.  · Install grab bars by the toilet and in the tub and shower. Do not use towel bars as grab bars.  · Use non-skid mats or decals on the floor of the tub or shower.  · If you need to sit down while you are in the shower, use a plastic, non-slip stool.  · Keep the floor dry. Immediately clean up any water that spills on the floor.  · Remove soap buildup in the tub or shower on a regular basis.  · Attach bath mats securely with double-sided non-slip rug tape.  · Remove throw rugs and other tripping hazards from the floor.  What can I do in the bedroom?  · Use night lights.  · Make sure that a bedside light is easy to reach.  · Do not use oversized bedding that drapes onto the floor.  · Have a firm chair that has side arms to use for getting dressed.  · Remove throw rugs and other tripping hazards from the floor.  What can I do in the kitchen?  · Clean up any spills right away.  · Avoid walking on wet floors.  · Place frequently used items in easy-to-reach places.  · If you need to reach for something above you, use a sturdy step stool that has a grab bar.  · Keep electrical cables out of the way.  · Do not use floor polish or wax that makes floors slippery. If you have to use wax, make sure that it is non-skid floor wax.  · Remove throw rugs and other tripping hazards from the floor.  What can I do in the stairways?  · Do not leave any items on the stairs.  · Make sure that there are handrails on both sides of the stairs. Fix handrails that are broken or loose. Make sure that handrails are as long as the stairways.  · Check any carpeting to make sure that it is firmly attached to the stairs. Fix any carpet that is loose or worn.  · Avoid having throw rugs at the top or bottom of stairways, or secure the rugs with carpet  tape to prevent them from moving.  · Make sure that you have a light switch at the top of the stairs and the bottom of the stairs. If you do not have them, have them installed.  What are some other fall prevention tips?  · Wear closed-toe shoes that fit well and support your feet. Wear shoes that have rubber soles or low heels.  · When you use a stepladder, make sure that it is completely opened and that the sides are firmly locked. Have someone hold the ladder while you are using it. Do not climb a closed stepladder.  · Add color or contrast paint or tape to grab bars and handrails in your home. Place contrasting color strips on the first and last steps.  · Use mobility aids as needed, such as canes, walkers, scooters, and crutches.  · Turn on lights if it is dark. Replace any light bulbs that burn out.  · Set up furniture so that there are clear paths. Keep the furniture in the same spot.  · Fix any uneven floor surfaces.  · Choose a carpet design that does not hide the edge of steps of a stairway.  · Be aware of any and all pets.  · Review your medicines with your healthcare provider. Some medicines can cause dizziness or changes in blood pressure, which increase your risk of falling.  Talk with your health care provider about other ways that you can decrease your risk of falls. This may include working with a physical therapist or  to improve your strength, balance, and endurance.  This information is not intended to replace advice given to you by your health care provider. Make sure you discuss any questions you have with your health care provider.  Document Released: 12/08/2003 Document Revised: 05/16/2017 Document Reviewed: 01/22/2016  [employer] Interactive Patient Education © 2018 [employer]  Warned patient that this medication can cause drowsiness and impair them operating machinery, including driving a car.  Caution is advised.     normal S1, S2 heard

## 2022-08-20 DIAGNOSIS — M79.7 FIBROMYALGIA: ICD-10-CM

## 2022-08-21 RX ORDER — TRAMADOL HYDROCHLORIDE 50 MG/1
TABLET ORAL
Qty: 120 TABLET | Refills: 2 | OUTPATIENT
Start: 2022-08-21

## 2022-09-14 ENCOUNTER — TELEMEDICINE (OUTPATIENT)
Dept: FAMILY MEDICINE CLINIC | Facility: CLINIC | Age: 77
End: 2022-09-14

## 2022-09-14 DIAGNOSIS — I48.0 PAF (PAROXYSMAL ATRIAL FIBRILLATION): ICD-10-CM

## 2022-09-14 DIAGNOSIS — N18.31 STAGE 3A CHRONIC KIDNEY DISEASE: ICD-10-CM

## 2022-09-14 DIAGNOSIS — E53.8 B12 DEFICIENCY: ICD-10-CM

## 2022-09-14 DIAGNOSIS — M79.7 FIBROMYALGIA: Primary | ICD-10-CM

## 2022-09-14 DIAGNOSIS — R73.01 IFG (IMPAIRED FASTING GLUCOSE): ICD-10-CM

## 2022-09-14 DIAGNOSIS — E55.9 VITAMIN D DEFICIENCY: ICD-10-CM

## 2022-09-14 DIAGNOSIS — F41.9 ANXIETY: ICD-10-CM

## 2022-09-14 DIAGNOSIS — I10 BENIGN ESSENTIAL HTN: ICD-10-CM

## 2022-09-14 DIAGNOSIS — E78.2 MIXED HYPERLIPIDEMIA: ICD-10-CM

## 2022-09-14 PROCEDURE — 99442 PR PHYS/QHP TELEPHONE EVALUATION 11-20 MIN: CPT | Performed by: PHYSICIAN ASSISTANT

## 2022-09-14 RX ORDER — TRAMADOL HYDROCHLORIDE 50 MG/1
50 TABLET ORAL EVERY 6 HOURS PRN
Qty: 120 TABLET | Refills: 2 | Status: SHIPPED | OUTPATIENT
Start: 2022-09-14 | End: 2022-12-08 | Stop reason: SDUPTHER

## 2022-09-14 RX ORDER — DULOXETIN HYDROCHLORIDE 60 MG/1
60 CAPSULE, DELAYED RELEASE ORAL DAILY
Qty: 30 CAPSULE | Refills: 11 | Status: SHIPPED | OUTPATIENT
Start: 2022-09-14

## 2022-09-14 NOTE — PATIENT INSTRUCTIONS
Fall Prevention in the Home, Adult  Falls can cause injuries and can affect people from all age groups. There are many simple things that you can do to make your home safe and to help prevent falls. Ask for help when making these changes, if needed.  What actions can I take to prevent falls?  General instructions  Use good lighting in all rooms. Replace any light bulbs that burn out.  Turn on lights if it is dark. Use night-lights.  Place frequently used items in easy-to-reach places. Lower the shelves around your home if necessary.  Set up furniture so that there are clear paths around it. Avoid moving your furniture around.  Remove throw rugs and other tripping hazards from the floor.  Avoid walking on wet floors.  Fix any uneven floor surfaces.  Add color or contrast paint or tape to grab bars and handrails in your home. Place contrasting color strips on the first and last steps of stairways.  When you use a stepladder, make sure that it is completely opened and that the sides are firmly locked. Have someone hold the ladder while you are using it. Do not climb a closed stepladder.  Be aware of any and all pets.  What can I do in the bathroom?         Keep the floor dry. Immediately clean up any water that spills onto the floor.  Remove soap buildup in the tub or shower on a regular basis.  Use non-skid mats or decals on the floor of the tub or shower.  Attach bath mats securely with double-sided, non-slip rug tape.  If you need to sit down while you are in the shower, use a plastic, non-slip stool.  Install grab bars by the toilet and in the tub and shower. Do not use towel bars as grab bars.  What can I do in the bedroom?  Make sure that a bedside light is easy to reach.  Do not use oversized bedding that drapes onto the floor.  Have a firm chair that has side arms to use for getting dressed.  What can I do in the kitchen?  Clean up any spills right away.  If you need to reach for something above you, use a  sturdy step stool that has a grab bar.  Keep electrical cables out of the way.  Do not use floor polish or wax that makes floors slippery. If you must use wax, make sure that it is non-skid floor wax.  What can I do in the stairways?  Do not leave any items on the stairs.  Make sure that you have a light switch at the top of the stairs and the bottom of the stairs. Have them installed if you do not have them.  Make sure that there are handrails on both sides of the stairs. Fix handrails that are broken or loose. Make sure that handrails are as long as the stairways.  Install non-slip stair treads on all stairs in your home.  Avoid having throw rugs at the top or bottom of stairways, or secure the rugs with carpet tape to prevent them from moving.  Choose a carpet design that does not hide the edge of steps on the stairway.  Check any carpeting to make sure that it is firmly attached to the stairs. Fix any carpet that is loose or worn.  What can I do on the outside of my home?  Use bright outdoor lighting.  Regularly repair the edges of walkways and driveways and fix any cracks.  Remove high doorway thresholds.  Trim any shrubbery on the main path into your home.  Regularly check that handrails are securely fastened and in good repair. Both sides of any steps should have handrails.  Install guardrails along the edges of any raised decks or porches.  Clear walkways of debris and clutter, including tools and rocks.  Have leaves, snow, and ice cleared regularly.  Use sand or salt on walkways during winter months.  In the garage, clean up any spills right away, including grease or oil spills.  What other actions can I take?  Wear closed-toe shoes that fit well and support your feet. Wear shoes that have rubber soles or low heels.  Use mobility aids as needed, such as canes, walkers, scooters, and crutches.  Review your medicines with your health care provider. Some medicines can cause dizziness or changes in blood  pressure, which increase your risk of falling.  Talk with your health care provider about other ways that you can decrease your risk of falls. This may include working with a physical therapist or  to improve your strength, balance, and endurance.  Where to find more information  Centers for Disease Control and PreventionARIANE: https://www.cdc.gov  [employer] [place] on Aging: https://vr3hroh.angi.nih.gov  Contact a health care provider if:  You are afraid of falling at home.  You feel weak, drowsy, or dizzy at home.  You fall at home.  Summary  There are many simple things that you can do to make your home safe and to help prevent falls.  Ways to make your home safe include removing tripping hazards and installing grab bars in the bathroom.  Ask for help when making these changes in your home.  This information is not intended to replace advice given to you by your health care provider. Make sure you discuss any questions you have with your health care provider.  Document Revised: 11/30/2018 Document Reviewed: 08/02/2018  [employer] Patient Education © 2021 [employer]

## 2022-09-14 NOTE — PROGRESS NOTES
Subjective   Miguelina Mueller is a 77 y.o. female.   You have chosen to receive care through a telehealth visit.  Do you consent to use a video/audio connection for your medical care today? Yes  Connected by telephone      Since the last visit, she has overall felt tired.  She has Impaired fasting glucose and will monitor labs to watch for DMII, Atrial Fibillation and remains under the care of their cardiologist for management, Vitamin D deficiency and will update labs for continued management and Next hyperlipidemia and patient's overdue for labs and must update labs to continue treatment.  Also has chronic kidney disease 3 yea and needs to update labs soon as possible for renal function, may need to adjust doses of her medications pending results..  she has been compliant with current medications have reviewed them.  The patient denies medication side effects.  Will refill medications. LMP  (LMP Unknown)   COVID over a month ago  More SOA for last few mos  Now SOA at rest and unable to walk more than a few steps-----SOA------worse----  I advised that patient call 911 and be seen in the emergency room with her complaint of not being able to walk more than a few steps without being short of breath.  Very concerned that this would be possibly congestive heart failure, worsening, heart disease, pneumonia or lung disease..... She is much worse and has history of A. Fib  Remains on Cymbalta for anxiety and depression does help some and aware that she can see psychiatry for further evaluation and an additional medication consult.  Does help pain some and does still require tramadol 4 times a day for the fibromyalgia pain and does help.  She tolerates it well.  She is due to see the cardiologist.  Results for orders placed or performed in visit on 05/05/20   Comprehensive metabolic panel    Specimen: Blood   Result Value Ref Range    Glucose 91 65 - 99 mg/dL    BUN 17 8 - 23 mg/dL    Creatinine 1.21 (H) 0.57 - 1.00 mg/dL     eGFR Non African Am 44 (L) >60 mL/min/1.73    eGFR African Am 53 (L) >60 mL/min/1.73    BUN/Creatinine Ratio 14.0 7.0 - 25.0    Sodium 137 136 - 145 mmol/L    Potassium 4.4 3.5 - 5.2 mmol/L    Chloride 100 98 - 107 mmol/L    Total CO2 27.4 22.0 - 29.0 mmol/L    Calcium 9.3 8.6 - 10.5 mg/dL    Total Protein 6.6 6.0 - 8.5 g/dL    Albumin 4.50 3.50 - 5.20 g/dL    Globulin 2.1 gm/dL    A/G Ratio 2.1 g/dL    Total Bilirubin 0.6 0.0 - 1.2 mg/dL    Alkaline Phosphatase 121 (H) 39 - 117 U/L    AST (SGOT) 20 1 - 32 U/L    ALT (SGPT) 14 1 - 33 U/L   Lipid panel    Specimen: Blood   Result Value Ref Range    Total Cholesterol 194 0 - 200 mg/dL    Triglycerides 135 0 - 150 mg/dL    HDL Cholesterol 72 (H) 40 - 60 mg/dL    VLDL Cholesterol Aidan 23 5 - 40 mg/dL    LDL Chol Calc (NIH) 99 0 - 100 mg/dL   TSH    Specimen: Blood   Result Value Ref Range    TSH 4.470 (H) 0.270 - 4.200 uIU/mL   Hemoglobin A1c    Specimen: Blood   Result Value Ref Range    Hemoglobin A1C 5.57 4.80 - 5.60 %   T3, Free    Specimen: Blood   Result Value Ref Range    T3, Free 2.9 2.0 - 4.4 pg/mL   T4, Free    Specimen: Blood   Result Value Ref Range    Free T4 1.26 0.93 - 1.70 ng/dL   Vitamin D 25 Hydroxy    Specimen: Blood   Result Value Ref Range    25 Hydroxy, Vitamin D 46.7 30.0 - 100.0 ng/ml   Vitamin B12    Specimen: Blood   Result Value Ref Range    Vitamin B-12 1,379 (H) 211 - 946 pg/mL   Folate    Specimen: Blood   Result Value Ref Range    Folate >20.00 4.78 - 24.20 ng/mL   Iron Profile   Result Value Ref Range    TIBC 421 mcg/dL    UIBC 324 112 - 346 mcg/dL    Iron 97 37 - 145 mcg/dL    Iron Saturation 23 20 - 50 %   Ferritin   Result Value Ref Range    Ferritin 169.00 (H) 13.00 - 150.00 ng/mL   Please Note   Result Value Ref Range    Please note Comment    Written Authorization   Result Value Ref Range    Written Authorization Comment    CBC and Differential    Specimen: Blood   Result Value Ref Range    WBC 11.25 (H) 3.40 - 10.80 10*3/mm3    RBC  5.29 (H) 3.77 - 5.28 10*6/mm3    Hemoglobin 16.4 (H) 12.0 - 15.9 g/dL    Hematocrit 49.9 (H) 34.0 - 46.6 %    MCV 94.3 79.0 - 97.0 fL    MCH 31.0 26.6 - 33.0 pg    MCHC 32.9 31.5 - 35.7 g/dL    RDW 13.1 12.3 - 15.4 %    Platelets 218 140 - 450 10*3/mm3    Neutrophil Rel % 60.9 42.7 - 76.0 %    Lymphocyte Rel % 27.4 19.6 - 45.3 %    Monocyte Rel % 6.6 5.0 - 12.0 %    Eosinophil Rel % 3.6 0.3 - 6.2 %    Basophil Rel % 1.0 0.0 - 1.5 %    Neutrophils Absolute 6.85 1.70 - 7.00 10*3/mm3    Lymphocytes Absolute 3.08 0.70 - 3.10 10*3/mm3    Monocytes Absolute 0.74 0.10 - 0.90 10*3/mm3    Eosinophils Absolute 0.41 (H) 0.00 - 0.40 10*3/mm3    Basophils Absolute 0.11 0.00 - 0.20 10*3/mm3    Immature Granulocyte Rel % 0.5 0.0 - 0.5 %    Immature Grans Absolute 0.06 (H) 0.00 - 0.05 10*3/mm3    nRBC 0.0 0.0 - 0.2 /100 WBC       The patient has read and signed the [facility] Controlled Substance Contract.  I will continue to see patient for regular follow up appointments.  They are well controlled on their medication.  NATA has been reviewed by me and is updated every 3 months. The patient is aware of the potential for addiction and dependence.  Also advised screening mammogram and bone density screening  The following portions of the patient's history were reviewed and updated as appropriate: allergies, current medications, past family history, past medical history, past social history, past surgical history and problem list.    Review of Systems   Constitutional: Positive for activity change, appetite change, diaphoresis and fatigue. Negative for fever and unexpected weight change.   HENT: Negative for nosebleeds and trouble swallowing.    Eyes: Negative for pain and visual disturbance.   Respiratory: Positive for chest tightness, shortness of breath and stridor. Negative for choking and wheezing.    Cardiovascular: Negative for chest pain and palpitations.   Gastrointestinal: Negative for blood in stool.   Endocrine:  Negative.  Negative for polydipsia.   Genitourinary: Negative for difficulty urinating, genital sores and hematuria.   Musculoskeletal: Positive for arthralgias, back pain, gait problem, joint swelling, myalgias and neck pain.   Skin: Negative for color change and rash.   Allergic/Immunologic: Negative.  Negative for immunocompromised state.   Neurological: Positive for weakness. Negative for seizures, syncope, facial asymmetry and speech difficulty.   Hematological: Negative for adenopathy.   Psychiatric/Behavioral: Positive for dysphoric mood and sleep disturbance. Negative for agitation, behavioral problems, confusion, self-injury and suicidal ideas. The patient is nervous/anxious.    All other systems reviewed and are negative.      Objective   Physical Exam  Constitutional:       General: She is not in acute distress.     Appearance: She is well-developed. She is not diaphoretic.   HENT:      Head: Normocephalic and atraumatic.   Eyes:      Conjunctiva/sclera: Conjunctivae normal.   Pulmonary:      Effort: Pulmonary effort is normal. No respiratory distress.   Musculoskeletal:         General: Normal range of motion.      Cervical back: Normal range of motion.   Skin:     General: Skin is dry.   Neurological:      Mental Status: She is alert and oriented to person, place, and time.      Coordination: Coordination normal.   Psychiatric:         Behavior: Behavior normal.         Thought Content: Thought content normal.         Judgment: Judgment normal.         Assessment & Plan   Diagnoses and all orders for this visit:    1. Fibromyalgia (Primary)    2. Benign essential HTN    3. Mixed hyperlipidemia    4. Stage 3a chronic kidney disease (CMS/HCC)    5. Anxiety    6. PAF (paroxysmal atrial fibrillation) ([facility])    7. B12 deficiency    8. Vitamin D deficiency    9. IFG (impaired fasting glucose)      Continue Cymbalta for anxiety and depression helps some and with pain  Continue Ultram for fibromyalgia pain does  help  Overdue for labs and must obtain labs to continue her medications  Follow-up with cardiology for A. fib and she is anticoagulated  Renally adjust any medications pending her lab results.  Also advised bone density screening and screening mammogram  Plan, Miguelina Mueller, was seen today.  she was seen for Imparied fasting glucose and plan follow up labs, diet, and exercise, Hyperlipidemia and will continue current medication, Atrial Fibrillation and remains on medication for treatment and is stable, Atrial Fibrillation and remains under the care of their cardiologist for medical management and is stable and Vitamin D deficiency and will update labs .  Continue folic acid supplement Rx and B12 and update labs  Chronic kidney disease update labs and avoid NSAIDs    Time was 16 minutes.    As noted in HPI advised patient call 911 due to her progressive shortness of breath and inability to walk to the car to come in for labs and worsening for the last month and history of A. fib

## 2022-09-21 LAB
25(OH)D3+25(OH)D2 SERPL-MCNC: 39.6 NG/ML (ref 30–100)
ALBUMIN SERPL-MCNC: 4 G/DL (ref 3.5–5.2)
ALBUMIN/GLOB SERPL: 1.9 G/DL
ALP SERPL-CCNC: 107 U/L (ref 39–117)
ALT SERPL-CCNC: 11 U/L (ref 1–33)
AST SERPL-CCNC: 16 U/L (ref 1–32)
BASOPHILS # BLD AUTO: 0.08 10*3/MM3 (ref 0–0.2)
BASOPHILS NFR BLD AUTO: 0.9 % (ref 0–1.5)
BILIRUB SERPL-MCNC: 0.6 MG/DL (ref 0–1.2)
BUN SERPL-MCNC: 12 MG/DL (ref 8–23)
BUN/CREAT SERPL: 13.6 (ref 7–25)
CALCIUM SERPL-MCNC: 9.4 MG/DL (ref 8.6–10.5)
CHLORIDE SERPL-SCNC: 105 MMOL/L (ref 98–107)
CHOLEST SERPL-MCNC: 178 MG/DL (ref 0–200)
CO2 SERPL-SCNC: 26.4 MMOL/L (ref 22–29)
CREAT SERPL-MCNC: 0.88 MG/DL (ref 0.57–1)
EGFRCR SERPLBLD CKD-EPI 2021: 68.6 ML/MIN/1.73
EOSINOPHIL # BLD AUTO: 0.14 10*3/MM3 (ref 0–0.4)
EOSINOPHIL NFR BLD AUTO: 1.5 % (ref 0.3–6.2)
ERYTHROCYTE [DISTWIDTH] IN BLOOD BY AUTOMATED COUNT: 12.7 % (ref 12.3–15.4)
FOLATE SERPL-MCNC: >20 NG/ML (ref 4.78–24.2)
GLOBULIN SER CALC-MCNC: 2.1 GM/DL
GLUCOSE SERPL-MCNC: 105 MG/DL (ref 65–99)
HBA1C MFR BLD: 5.7 % (ref 4.8–5.6)
HCT VFR BLD AUTO: 46.8 % (ref 34–46.6)
HDLC SERPL-MCNC: 51 MG/DL (ref 40–60)
HGB BLD-MCNC: 16.1 G/DL (ref 12–15.9)
IMM GRANULOCYTES # BLD AUTO: 0.03 10*3/MM3 (ref 0–0.05)
IMM GRANULOCYTES NFR BLD AUTO: 0.3 % (ref 0–0.5)
LDLC SERPL CALC-MCNC: 103 MG/DL (ref 0–100)
LYMPHOCYTES # BLD AUTO: 1.97 10*3/MM3 (ref 0.7–3.1)
LYMPHOCYTES NFR BLD AUTO: 21.6 % (ref 19.6–45.3)
MCH RBC QN AUTO: 30.5 PG (ref 26.6–33)
MCHC RBC AUTO-ENTMCNC: 34.4 G/DL (ref 31.5–35.7)
MCV RBC AUTO: 88.6 FL (ref 79–97)
MONOCYTES # BLD AUTO: 0.55 10*3/MM3 (ref 0.1–0.9)
MONOCYTES NFR BLD AUTO: 6 % (ref 5–12)
NEUTROPHILS # BLD AUTO: 6.33 10*3/MM3 (ref 1.7–7)
NEUTROPHILS NFR BLD AUTO: 69.7 % (ref 42.7–76)
NRBC BLD AUTO-RTO: 0 /100 WBC (ref 0–0.2)
PLATELET # BLD AUTO: 225 10*3/MM3 (ref 140–450)
POTASSIUM SERPL-SCNC: 4.4 MMOL/L (ref 3.5–5.2)
PROT SERPL-MCNC: 6.1 G/DL (ref 6–8.5)
RBC # BLD AUTO: 5.28 10*6/MM3 (ref 3.77–5.28)
SODIUM SERPL-SCNC: 141 MMOL/L (ref 136–145)
T3FREE SERPL-MCNC: 3.1 PG/ML (ref 2–4.4)
T4 FREE SERPL-MCNC: 1.16 NG/DL (ref 0.93–1.7)
TRIGL SERPL-MCNC: 137 MG/DL (ref 0–150)
TSH SERPL DL<=0.005 MIU/L-ACNC: 0.97 UIU/ML (ref 0.27–4.2)
VIT B12 SERPL-MCNC: 1483 PG/ML (ref 211–946)
VLDLC SERPL CALC-MCNC: 24 MG/DL (ref 5–40)
WBC # BLD AUTO: 9.1 10*3/MM3 (ref 3.4–10.8)

## 2022-11-01 ENCOUNTER — OFFICE VISIT (OUTPATIENT)
Dept: CARDIOLOGY | Facility: CLINIC | Age: 77
End: 2022-11-01

## 2022-11-01 VITALS
HEIGHT: 64 IN | WEIGHT: 174 LBS | SYSTOLIC BLOOD PRESSURE: 130 MMHG | HEART RATE: 103 BPM | BODY MASS INDEX: 29.71 KG/M2 | DIASTOLIC BLOOD PRESSURE: 80 MMHG

## 2022-11-01 DIAGNOSIS — R06.09 DYSPNEA ON EXERTION: Primary | ICD-10-CM

## 2022-11-01 DIAGNOSIS — I48.21 PERMANENT ATRIAL FIBRILLATION: ICD-10-CM

## 2022-11-01 PROCEDURE — 99214 OFFICE O/P EST MOD 30 MIN: CPT | Performed by: INTERNAL MEDICINE

## 2022-11-01 PROCEDURE — 93000 ELECTROCARDIOGRAM COMPLETE: CPT | Performed by: INTERNAL MEDICINE

## 2022-11-03 NOTE — PROGRESS NOTES
"      CARDIOLOGY    Rajesh Dover MD    ENCOUNTER DATE:  11/01/2022    Miguelina Mueller / 77 y.o. / female        CHIEF COMPLAINT / REASON FOR OFFICE VISIT     PAF (paroxysmal atrial fibrillation)  (06/24/2021 Follow up)      HISTORY OF PRESENT ILLNESS       HPI  Miguelina Mueller is a 77 y.o. female who presents today for reevaluation.  Patient has been very short of breath for many years now and has never determine the etiology.  However this time she presents saying she is 100 times worse.  She says pretty much any little thing makes her incredibly short of breath.  She denies any types of chest pains palpitations lightheadedness or swelling.      The following portions of the patient's history were reviewed and updated as appropriate: allergies, current medications, past family history, past medical history, past social history, past surgical history and problem list.      VITAL SIGNS     Visit Vitals  /80 (BP Location: Left arm)   Pulse 103   Ht 162.6 cm (64\")   Wt 78.9 kg (174 lb)   LMP  (LMP Unknown)   BMI 29.87 kg/m²         Wt Readings from Last 3 Encounters:   11/01/22 78.9 kg (174 lb)   03/31/22 81.6 kg (180 lb)   10/12/21 78.9 kg (174 lb)     Body mass index is 29.87 kg/m².      REVIEW OF SYSTEMS   ROS        PHYSICAL EXAMINATION     Vitals reviewed.   Constitutional:       Appearance: Healthy appearance.   Pulmonary:      Effort: Pulmonary effort is normal.   Cardiovascular:      Normal rate. Irregularly irregular rhythm. Normal S1. Normal S2.      Murmurs: There is no murmur.      No gallop. No click. No rub.   Pulses:     Intact distal pulses.   Edema:     Peripheral edema absent.   Neurological:      Mental Status: Alert and oriented to person, place and time.           REVIEWED DATA       ECG 12 Lead    Date/Time: 11/1/2022 1:59 PM  Performed by: Rajesh Dover MD  Authorized by: Rajesh Dover MD   Comparison: compared with previous ECG from 10/29/2020  Similar to previous " ECG  Rhythm: atrial fibrillation    Clinical impression: abnormal EKG            Cardiac Procedures:  1.     Lipid Panel    Lipid Panel 9/20/22   Total Cholesterol 178   Triglycerides 137   HDL Cholesterol 51   VLDL Cholesterol 24   LDL Cholesterol  103 (A)   (A) Abnormal value       Comments are available for some flowsheets but are not being displayed.               ASSESSMENT & PLAN      Diagnosis Plan   1. Dyspnea on exertion  Adult Transthoracic Echo Complete W/ Cont if Necessary Per Protocol    Holter Monitor - 24 Hour    Ambulatory Referral to Pulmonology            SUMMARY/DISCUSSION  1. Atrial fibrillation.  Patient remains in atrial fibrillation.  Her heart rate is little bit elevated but I really want to know what her overall heart rate is doing for 24 hours.  I placed a monitor on her to see what her heart rates are ranging.  2. Shortness of breath she could have a tachycardic induced cardiomyopathy so I set her up for an echocardiogram.  I will also refer her to pulmonology she has never had a pulmonology work-up for her dyspnea.  3. We will see what these test results show encounter and reassess from there.  If they are unrevealing I may even consider heart catheterization because she is just so much worse.        MEDICATIONS         Discharge Medications          Accurate as of November 1, 2022 11:59 PM. If you have any questions, ask your nurse or doctor.            Continue These Medications      Instructions Start Date   B-12 1000 MCG sublingual tablet   TAKE ONE TABLET UNDER THE TONGUE EVERY DAY      Cholecalciferol 50 MCG (2000 UT) capsule  Commonly known as: D3 Super Strength   2,000 Units, Oral, Daily      Dilt- MG 24 hr capsule  Generic drug: dilTIAZem XR   TAKE ONE CAPSULE BY MOUTH EVERY DAY      DULoxetine 60 MG capsule  Commonly known as: CYMBALTA   60 mg, Oral, Daily, For stress and pain      folic acid 1 MG tablet  Commonly known as: FOLVITE   1,000 mcg, Oral, Daily      pravastatin  80 MG tablet  Commonly known as: PRAVACHOL   TAKE ONE TABLET BY MOUTH EVERY DAY      traMADol 50 MG tablet  Commonly known as: ULTRAM   50 mg, Oral, Every 6 Hours PRN, for pain      Xarelto 20 MG tablet  Generic drug: rivaroxaban   Take 1 tablet by mouth Daily.                 **Dragon Disclaimer:   Much of this encounter note is an electronic transcription/translation of spoken language to printed text. The electronic translation of spoken language may permit erroneous, or at times, nonsensical words or phrases to be inadvertently transcribed. Although I have reviewed the note for such errors, some may still exist.

## 2022-11-12 DIAGNOSIS — M79.7 FIBROMYALGIA: ICD-10-CM

## 2022-11-13 RX ORDER — TRAMADOL HYDROCHLORIDE 50 MG/1
TABLET ORAL
Qty: 120 TABLET | Refills: 2 | OUTPATIENT
Start: 2022-11-13

## 2022-12-08 ENCOUNTER — TELEMEDICINE (OUTPATIENT)
Dept: FAMILY MEDICINE CLINIC | Facility: CLINIC | Age: 77
End: 2022-12-08

## 2022-12-08 DIAGNOSIS — N28.9 RENAL INSUFFICIENCY: ICD-10-CM

## 2022-12-08 DIAGNOSIS — E53.8 B12 DEFICIENCY: ICD-10-CM

## 2022-12-08 DIAGNOSIS — E55.9 VITAMIN D DEFICIENCY: ICD-10-CM

## 2022-12-08 DIAGNOSIS — I48.0 PAF (PAROXYSMAL ATRIAL FIBRILLATION): ICD-10-CM

## 2022-12-08 DIAGNOSIS — F41.9 ANXIETY: ICD-10-CM

## 2022-12-08 DIAGNOSIS — R06.02 EXERTIONAL SHORTNESS OF BREATH: ICD-10-CM

## 2022-12-08 DIAGNOSIS — M79.7 FIBROMYALGIA: ICD-10-CM

## 2022-12-08 DIAGNOSIS — F33.0 MILD EPISODE OF RECURRENT MAJOR DEPRESSIVE DISORDER: ICD-10-CM

## 2022-12-08 DIAGNOSIS — E78.2 MIXED HYPERLIPIDEMIA: Primary | ICD-10-CM

## 2022-12-08 DIAGNOSIS — E53.8 LOW FOLIC ACID: ICD-10-CM

## 2022-12-08 PROBLEM — N18.31 STAGE 3A CHRONIC KIDNEY DISEASE (HCC): Status: RESOLVED | Noted: 2018-05-29 | Resolved: 2022-12-08

## 2022-12-08 PROCEDURE — 99442 PR PHYS/QHP TELEPHONE EVALUATION 11-20 MIN: CPT | Performed by: PHYSICIAN ASSISTANT

## 2022-12-08 RX ORDER — TRAMADOL HYDROCHLORIDE 50 MG/1
50 TABLET ORAL EVERY 6 HOURS PRN
Qty: 120 TABLET | Refills: 2 | Status: SHIPPED | OUTPATIENT
Start: 2022-12-08 | End: 2023-03-09 | Stop reason: SDUPTHER

## 2022-12-08 NOTE — PROGRESS NOTES
Subjective   Miguelina Mueller is a 77 y.o. female.     History of Present Illness     You have chosen to receive care through a telehealth visit.  Do you consent to use a video/audio connection for your medical care today? Yes  BMI Readings from Last 1 Encounters:   11/01/22 29.87 kg/m²   Patient unable to connect by video and we communicated by phone today.  She was at home in her living room and I was at my desk at the office     Since the last visit, she has overall felt tired.  She has Impaired fasting glucose and will monitor labs to watch for DMII, Vitamin D deficiency and labs are at goal >30 ng/mL and Mixed hyperlipidemia cholesterol is improved on pravastatin not at goal and patient is intolerant to other statins and Zetia.  YEVGENIY madrid managed by cardiologist Dr. Dover and had recent visit.  she has been compliant with current medications have reviewed them.  The patient denies medication side effects.  Will refill medications. LMP  (LMP Unknown)     Results for orders placed or performed in visit on 10/12/21   Comprehensive metabolic panel    Specimen: Blood   Result Value Ref Range    Glucose 105 (H) 65 - 99 mg/dL    BUN 12 8 - 23 mg/dL    Creatinine 0.88 0.57 - 1.00 mg/dL    EGFR Result 68.6 >60.0 mL/min/1.73    BUN/Creatinine Ratio 13.6 7.0 - 25.0    Sodium 141 136 - 145 mmol/L    Potassium 4.4 3.5 - 5.2 mmol/L    Chloride 105 98 - 107 mmol/L    Total CO2 26.4 22.0 - 29.0 mmol/L    Calcium 9.4 8.6 - 10.5 mg/dL    Total Protein 6.1 6.0 - 8.5 g/dL    Albumin 4.00 3.50 - 5.20 g/dL    Globulin 2.1 gm/dL    A/G Ratio 1.9 g/dL    Total Bilirubin 0.6 0.0 - 1.2 mg/dL    Alkaline Phosphatase 107 39 - 117 U/L    AST (SGOT) 16 1 - 32 U/L    ALT (SGPT) 11 1 - 33 U/L   Lipid panel    Specimen: Blood   Result Value Ref Range    Total Cholesterol 178 0 - 200 mg/dL    Triglycerides 137 0 - 150 mg/dL    HDL Cholesterol 51 40 - 60 mg/dL    VLDL Cholesterol Aidan 24 5 - 40 mg/dL    LDL Chol Calc ([facility]) 103 (H) 0 - 100 mg/dL   TSH     Specimen: Blood   Result Value Ref Range    TSH 0.966 0.270 - 4.200 uIU/mL   Hemoglobin A1c    Specimen: Blood   Result Value Ref Range    Hemoglobin A1C 5.70 (H) 4.80 - 5.60 %   T3, Free    Specimen: Blood   Result Value Ref Range    T3, Free 3.1 2.0 - 4.4 pg/mL   T4, Free    Specimen: Blood   Result Value Ref Range    Free T4 1.16 0.93 - 1.70 ng/dL   Vitamin B12    Specimen: Blood   Result Value Ref Range    Vitamin B-12 1,483 (H) 211 - 946 pg/mL   Folate    Specimen: Blood   Result Value Ref Range    Folate >20.00 4.78 - 24.20 ng/mL   Vitamin D 25 Hydroxy    Specimen: Blood   Result Value Ref Range    25 Hydroxy, Vitamin D 39.6 30.0 - 100.0 ng/ml   CBC and Differential    Specimen: Blood   Result Value Ref Range    WBC 9.10 3.40 - 10.80 10*3/mm3    RBC 5.28 3.77 - 5.28 10*6/mm3    Hemoglobin 16.1 (H) 12.0 - 15.9 g/dL    Hematocrit 46.8 (H) 34.0 - 46.6 %    MCV 88.6 79.0 - 97.0 fL    MCH 30.5 26.6 - 33.0 pg    MCHC 34.4 31.5 - 35.7 g/dL    RDW 12.7 12.3 - 15.4 %    Platelets 225 140 - 450 10*3/mm3    Neutrophil Rel % 69.7 42.7 - 76.0 %    Lymphocyte Rel % 21.6 19.6 - 45.3 %    Monocyte Rel % 6.0 5.0 - 12.0 %    Eosinophil Rel % 1.5 0.3 - 6.2 %    Basophil Rel % 0.9 0.0 - 1.5 %    Neutrophils Absolute 6.33 1.70 - 7.00 10*3/mm3    Lymphocytes Absolute 1.97 0.70 - 3.10 10*3/mm3    Monocytes Absolute 0.55 0.10 - 0.90 10*3/mm3    Eosinophils Absolute 0.14 0.00 - 0.40 10*3/mm3    Basophils Absolute 0.08 0.00 - 0.20 10*3/mm3    Immature Granulocyte Rel % 0.3 0.0 - 0.5 %    Immature Grans Absolute 0.03 0.00 - 0.05 10*3/mm3    nRBC 0.0 0.0 - 0.2 /100 WBC   Hemoglobin A1c and glucose just barely in prediabetes range we will continue to monitor with yearly labs and advise low glycemic index diet.  Thyroid labs are in normal range.  LDL cholesterol is slightly above 100 but she is intolerant to other statins and tried several we will continue same dose pravastatin 80 mg.  Kidney and liver functions in normal range.   Hemoglobin continues to be elevated and I looked at her prior echo from cardiology her right ventricular systolic pressure was elevated on report----advise evaluation with sleep medicine for obstructive sleep apnea.  I suspect this is contributing to patient's fatigue.  I will place referral if patient agrees?  Other labs okay  Declined referral to sleep med  Did see cardio and also concern her SOA on exertion may be pulmonary. Getting echo and Holter and f/u appt.   I have already CT chest.  ------I also ordered referral to pulm back in March.  Remains on Cymbalta for anxiety and depression does help some and aware that she can see psychiatry for further evaluation and an additional medication consult.  Does help pain some and does still require tramadol 4 times a day for the fibromyalgia pain and does help.  She tolerates it well.  On Ultram for fibro pain and helps some.   The patient has read and signed the [facility] Controlled Substance Contract.  I will continue to see patient for regular follow up appointments.  They are well controlled on their medication.  NATA has been reviewed by me and is updated every 3 months. The patient is aware of the potential for addiction and dependence.      The following portions of the patient's history were reviewed and updated as appropriate: allergies, current medications, past family history, past medical history, past social history, past surgical history and problem list.    Review of Systems   Constitutional: Positive for activity change, appetite change and fatigue. Negative for unexpected weight change.   HENT: Negative for nosebleeds and trouble swallowing.    Eyes: Negative for pain and visual disturbance.   Respiratory: Positive for shortness of breath. Negative for chest tightness and wheezing.    Cardiovascular: Negative for chest pain and palpitations.   Gastrointestinal: Negative for abdominal pain and blood in stool.   Endocrine: Negative.     Genitourinary: Negative for difficulty urinating and hematuria.   Musculoskeletal: Negative for joint swelling.   Skin: Negative for color change and rash.   Allergic/Immunologic: Negative.    Neurological: Positive for light-headedness. Negative for syncope and speech difficulty.   Hematological: Negative for adenopathy.   Psychiatric/Behavioral: Positive for dysphoric mood and sleep disturbance. Negative for agitation and confusion. The patient is nervous/anxious.    All other systems reviewed and are negative.      Objective   Physical Exam  Pulmonary:      Effort: No respiratory distress.   Neurological:      Mental Status: She is alert.         Assessment & Plan   Diagnoses and all orders for this visit:    1. Mixed hyperlipidemia (Primary)  Comments:  LDL cholesterol was not at goal but improved on pravastatin intolerant to any other meds    2. Fibromyalgia  Comments:  Continue Cymbalta and tramadol helped some  Orders:  -     traMADol (ULTRAM) 50 MG tablet; Take 1 tablet by mouth Every 6 (Six) Hours As Needed for Moderate Pain. for pain  Dispense: 120 tablet; Refill: 2    3. Mild episode of recurrent major depressive disorder (HCC)  Comments:  Cymbalta helped some we will continue Rx    4. Anxiety  Comments:  Cymbalta helps and will continue her    5. PAF (paroxysmal atrial fibrillation) (HCC)  Comments:  Per cardiologist    6. B12 deficiency  Comments:  Continue supplement working well    7. Low folic acid  Comments:  Continue supplement working well    8. Renal insufficiency  Comments:  last labs normal    9. Vitamin D deficiency  Comments:  Continue supplement working well    10. Exertional shortness of breath  Comments:  As noted patient has order for CT of the chest that I made in March also made referral for pulmonologist in March and just saw cardiology and also made referral      Phone time spent was 18 minutes  Went over lab results  Agree with referral pulm----I made referral back in March---now  cardio did also. Must go. Need eval  Get echo and Holter per cardio, f/u cardio Dr Dover  Cont Damienmbalta for pain and depression--helps some  Cont folic acid, D , B12---working and just had labs  Aidan [phone] to set up CT chest

## 2022-12-29 ENCOUNTER — TELEPHONE (OUTPATIENT)
Dept: CARDIOLOGY | Facility: CLINIC | Age: 77
End: 2022-12-29

## 2022-12-29 NOTE — TELEPHONE ENCOUNTER
Called Miguelina Mueller and transferred to scheduling to reschedule appointments.    Thank you,  Patricia Boone RN  Triage Nurse G

## 2022-12-29 NOTE — TELEPHONE ENCOUNTER
Caller: Miguelina Mueller    Relationship to patient: Self    Best call back number: [phone]    Type of visit: TESTING    Requested date: FIRST AVAILABLE     If rescheduling, when is the original appointment: WAS 01.03.2023, CANCELLED IN EPIC     Additional notes:  PT NEEDS TO [facility] HOLTER PLACEMENT AND ECHO APPT - PT REPORTS SHE HAS LEFT MESSAGES AND TRIED TO GET IN TOUCH WITH OFFICE TO [facility] APPT BUT HAS NOT BEEN SUCCESSFUL - PT WANTED TO MAKE SURE APPT WAS CANCELLED TIMELY - APPT CANCELLED IN [facility] AND PT REQUESTING A CALL BACK TO [facility]        Patient's belongings returned

## 2023-01-05 NOTE — ED NOTES
Pt reports a cough and shortness of breath for 3 days     Elina Chiu, RN  09/02/19 [id number]     Opioid Pregnancy And Lactation Text: These medications can lead to premature delivery and should be avoided during pregnancy. These medications are also present in breast milk in small amounts.

## 2023-01-06 RX ORDER — RIVAROXABAN 20 MG/1
TABLET, FILM COATED ORAL
Qty: 30 TABLET | Refills: 11 | Status: SHIPPED | OUTPATIENT
Start: 2023-01-06

## 2023-01-25 DIAGNOSIS — M79.7 FIBROMYALGIA: ICD-10-CM

## 2023-01-25 RX ORDER — TRAMADOL HYDROCHLORIDE 50 MG/1
TABLET ORAL
Qty: 120 TABLET | Refills: 2 | OUTPATIENT
Start: 2023-01-25

## 2023-02-23 DIAGNOSIS — M79.7 FIBROMYALGIA: ICD-10-CM

## 2023-02-23 RX ORDER — TRAMADOL HYDROCHLORIDE 50 MG/1
TABLET ORAL
Qty: 120 TABLET | Refills: 2 | OUTPATIENT
Start: 2023-02-23

## 2023-02-23 RX ORDER — PRAVASTATIN SODIUM 80 MG/1
TABLET ORAL
Qty: 30 TABLET | Refills: 11 | OUTPATIENT
Start: 2023-02-23

## 2023-03-01 ENCOUNTER — TELEPHONE (OUTPATIENT)
Dept: FAMILY MEDICINE CLINIC | Facility: CLINIC | Age: 78
End: 2023-03-01
Payer: MEDICARE

## 2023-03-01 NOTE — TELEPHONE ENCOUNTER
Called and spoke with patient to inform her that her RX for Tramadol is not due to be filled until 3/8/2023.  She stated that she realizes that she made a mistake thinking that it was due already

## 2023-03-01 NOTE — TELEPHONE ENCOUNTER
Caller: Miguelina Mueller    Relationship: Self    Best call back number:[phone]      What was the call regarding: PATIENT STATES THAT HER PHARMACY SHOULD HAVE FAXED OVER A REQUESTS FOR A COUPLE OF DAYS SUPPLY OF traMADol (ULTRAM) 50 MG tablet TO LAST UNTIL HER APPOINTMENT ON 3/9/23 AND WAS CHECKING TO KNOW IF IT WAS RECEIVED.

## 2023-03-02 DIAGNOSIS — M79.7 FIBROMYALGIA: ICD-10-CM

## 2023-03-02 RX ORDER — TRAMADOL HYDROCHLORIDE 50 MG/1
TABLET ORAL
Qty: 120 TABLET | Refills: 2 | OUTPATIENT
Start: 2023-03-02

## 2023-03-09 ENCOUNTER — TELEMEDICINE (OUTPATIENT)
Dept: FAMILY MEDICINE CLINIC | Facility: CLINIC | Age: 78
End: 2023-03-09
Payer: MEDICARE

## 2023-03-09 VITALS — HEIGHT: 64 IN | BODY MASS INDEX: 29.87 KG/M2

## 2023-03-09 DIAGNOSIS — M79.7 FIBROMYALGIA: ICD-10-CM

## 2023-03-09 PROCEDURE — 99442 PR PHYS/QHP TELEPHONE EVALUATION 11-20 MIN: CPT | Performed by: PHYSICIAN ASSISTANT

## 2023-03-09 RX ORDER — PRAVASTATIN SODIUM 80 MG/1
80 TABLET ORAL DAILY
Qty: 30 TABLET | Refills: 11 | Status: SHIPPED | OUTPATIENT
Start: 2023-03-09

## 2023-03-09 RX ORDER — TRAMADOL HYDROCHLORIDE 50 MG/1
50 TABLET ORAL EVERY 6 HOURS PRN
Qty: 120 TABLET | Refills: 2 | Status: SHIPPED | OUTPATIENT
Start: 2023-03-09

## 2023-03-09 NOTE — PATIENT INSTRUCTIONS
Fall Prevention in the Home, Adult  Falls can cause injuries and affect people of all ages. There are many simple things that you can do to make your home safe and to help prevent falls. Ask for help when making these changes, if needed.  What actions can I take to prevent falls?  General instructions  Use good lighting in all rooms. Replace any light bulbs that burn out, turn on lights if it is dark, and use night-lights.  Place frequently used items in easy-to-reach places. Lower the shelves around your home if necessary.  Set up furniture so that there are clear paths around it. Avoid moving your furniture around.  Remove throw rugs and other tripping hazards from the floor.  Avoid walking on wet floors.  Fix any uneven floor surfaces.  Add color or contrast paint or tape to grab bars and handrails in your home. Place contrasting color strips on the first and last steps of staircases.  When you use a stepladder, make sure that it is completely opened and that the sides and supports are firmly locked. Have someone hold the ladder while you are using it. Do not climb a closed stepladder.  Know where your pets are when moving through your home.  What can I do in the bathroom?     Keep the floor dry. Immediately clean up any water that is on the floor.  Remove soap buildup in the tub or shower regularly.  Use nonskid mats or decals on the floor of the tub or shower.  Attach bath mats securely with double-sided, nonslip rug tape.  If you need to sit down while you are in the shower, use a plastic, nonslip stool.  Install grab bars by the toilet and in the tub and shower. Do not use towel bars as grab bars.  What can I do in the bedroom?  Make sure that a bedside light is easy to reach.  Do not use oversized bedding that reaches the floor.  Have a firm chair that has side arms to use for getting dressed.  What can I do in the kitchen?  Clean up any spills right away.  If you need to reach for something above you, use a  sturdy step stool that has a grab bar.  Keep electrical cables out of the way.  Do not use floor polish or wax that makes floors slippery. If you must use wax, make sure that it is non-skid floor wax.  What can I do with my stairs?  Do not leave any items on the stairs.  Make sure that you have a light switch at the top and the bottom of the stairs. Have them installed if you do not have them.  Make sure that there are handrails on both sides of the stairs. Fix handrails that are broken or loose. Make sure that handrails are as long as the staircases.  Install non-slip stair treads on all stairs in your home.  Avoid having throw rugs at the top or bottom of stairs, or secure the rugs with carpet tape to prevent them from moving.  Choose a carpet design that does not hide the edge of steps on the stairs.  Check any carpeting to make sure that it is firmly attached to the stairs. Fix any carpet that is loose or worn.  What can I do on the outside of my home?  Use bright outdoor lighting.  Regularly repair the edges of walkways and driveways and fix any cracks.  Remove high doorway thresholds.  Trim any shrubbery on the main path into your home.  Regularly check that handrails are securely fastened and in good repair. Both sides of all steps should have handrails.  Install guardrails along the edges of any raised decks or porches.  Clear walkways of debris and clutter, including tools and rocks.  Have leaves, snow, and ice cleared regularly.  Use sand or salt on walkways during winter months.  In the garage, clean up any spills right away, including grease or oil spills.  What other actions can I take?  Wear closed-toe shoes that fit well and support your feet. Wear shoes that have rubber soles or low heels.  Use mobility aids as needed, such as canes, walkers, scooters, and crutches.  Review your medicines with your health care provider. Some medicines can cause dizziness or changes in blood pressure, which increase  your risk of falling.  Talk with your health care provider about other ways that you can decrease your risk of falls. This may include working with a physical therapist or  to improve your strength, balance, and endurance.  Where to find more information  Centers for Disease Control and PreventionARIANE: www.cdc.gov  National [place] on Aging: www.angi.nih.gov  Contact a health care provider if:  You are afraid of falling at home.  You feel weak, drowsy, or dizzy at home.  You fall at home.  Summary  There are many simple things that you can do to make your home safe and to help prevent falls.  Ways to make your home safe include removing tripping hazards and installing grab bars in the bathroom.  Ask for help when making these changes in your home.  This information is not intended to replace advice given to you by your health care provider. Make sure you discuss any questions you have with your health care provider.  Document Revised: 09/19/2022 Document Reviewed: 07/21/2021  Elsevier Patient Education © 2022 [employer]

## 2023-03-09 NOTE — PROGRESS NOTES
"Subjective   Miguelina Mueller is a 77 y.o. female.   Patient unable to connect by video and we communicated by phone today.  She was at home in her living room and I was at my desk at the office  BMI Readings from Last 1 Encounters:   03/09/23 29.87 kg/m²       History of Present Illness   Miguelina Mueller 77 y.o. female Ht 162.6 cm (64\")   LMP  (LMP Unknown)   BMI 29.87 kg/m²  who presents today for Fibromyalgia with chronic pain.  Need to fill Ultram.  Still dyspnea..    she has a history of   Patient Active Problem List   Diagnosis   • Anxiety   • Mild episode of recurrent major depressive disorder (HCC)   • Fibromyalgia   • Hyperlipidemia   • Benign essential HTN   • IFG (impaired fasting glucose)   • Renal insufficiency   • Shoulder bursitis   • Seasonal allergic rhinitis   • PAF (paroxysmal atrial fibrillation) (HCC)   • Vitamin D deficiency   • B12 deficiency   • Low folic acid   • Anemia of chronic disease   .        Remains on Cymbalta for anxiety and depression does help some and aware that she can see psychiatry for further evaluation and an additional medication consult.  Does help pain some and does still require tramadol 4 times a day for the fibromyalgia pain and does help.  She tolerates it well.  On Ultram for fibro pain and helps some.   The patient has read and signed the [facility] Controlled Substance Contract.  I will continue to see patient for regular follow up appointments.  They are well controlled on their medication.  NATA has been reviewed by me and is updated every 3 months. The patient is aware of the potential for addiction and dependence.  Declined referral to sleep med  Did see cardio and also concern her SOA on exertion may be pulmonary. Getting echo and Holter and f/u appt.   I have already CT chest.  ------I also ordered referral to pulm back in March.  Had labs Sept   Yuki Pennington PA-C   9/21/2022  7:09 PM EDT       Hemoglobin A1c and glucose just barely in prediabetes range " we will continue to monitor with yearly labs and advise low glycemic index diet.  Thyroid labs are in normal range.  LDL cholesterol is slightly above 100 but she is intolerant to other statins and tried several we will continue same dose pravastatin 80 mg.  Kidney and liver functions in normal range.  Hemoglobin continues to be elevated and I looked at her prior echo from cardiology her right ventricular systolic pressure was elevated on report----advise evaluation with sleep medicine for obstructive sleep apnea.  I suspect this is contributing to patient's fatigue.  I will place referral if patient agrees?  Other labs okay       The following portions of the patient's history were reviewed and updated as appropriate: allergies, current medications, past family history, past medical history, past social history, past surgical history and problem list.    Review of Systems   Constitutional: Positive for fatigue. Negative for activity change, appetite change and unexpected weight change.   HENT: Negative for nosebleeds and trouble swallowing.    Eyes: Negative for pain and visual disturbance.   Respiratory: Positive for shortness of breath. Negative for chest tightness and wheezing.    Cardiovascular: Negative for chest pain and palpitations.   Gastrointestinal: Negative for abdominal pain and blood in stool.   Endocrine: Negative.    Genitourinary: Negative for difficulty urinating and hematuria.   Musculoskeletal: Negative for joint swelling.   Skin: Negative for color change and rash.   Allergic/Immunologic: Negative.    Neurological: Positive for light-headedness. Negative for syncope and speech difficulty.   Hematological: Negative for adenopathy.   Psychiatric/Behavioral: Positive for dysphoric mood and sleep disturbance. Negative for agitation and confusion. The patient is nervous/anxious.    All other systems reviewed and are negative.      Objective   Physical Exam  Pulmonary:      Effort: Pulmonary effort  is normal.   Neurological:      Mental Status: She is alert and oriented to person, place, and time.   Psychiatric:         Behavior: Behavior normal.         Thought Content: Thought content normal.         Judgment: Judgment normal.         Assessment & Plan   Diagnoses and all orders for this visit:    1. Fibromyalgia  Comments:  Continue Cymbalta and tramadol helped some  Orders:  -     traMADol (ULTRAM) 50 MG tablet; Take 1 tablet by mouth Every 6 (Six) Hours As Needed for Moderate Pain. for pain  Dispense: 120 tablet; Refill: 2    Other orders  -     pravastatin (PRAVACHOL) 80 MG tablet; Take 1 tablet by mouth Daily. For cholesterol  Dispense: 30 tablet; Refill: 11    Will continue Cymbalta for anxiety and depression as noted above medicine does help although patient has breakthrough symptoms----declines referral to psychiatry or changing the  2 for refill of tramadol for fibromyalgia pain does help--did offer referral to pain management patient declines  Declines bone density and mammogram screening  She is aware that she needs to follow-up with cardiology for the echo and Holter  No change in shortness of breath continues  We will continue folic acid--- this lab in September  Put refill on pravastatin for treatment of mixed hyperlipidemia and she tolerates this well  Time spent 15 minutes

## 2023-03-16 RX ORDER — MAGNESIUM 200 MG
TABLET ORAL
Qty: 90 EACH | Refills: 3 | Status: SHIPPED | OUTPATIENT
Start: 2023-03-16

## 2023-05-04 DIAGNOSIS — M79.7 FIBROMYALGIA: ICD-10-CM

## 2023-05-04 RX ORDER — TRAMADOL HYDROCHLORIDE 50 MG/1
TABLET ORAL
Qty: 120 TABLET | Refills: 2 | OUTPATIENT
Start: 2023-05-04

## 2023-05-17 RX ORDER — FOLIC ACID 1 MG/1
TABLET ORAL
Qty: 90 TABLET | Refills: 3 | Status: SHIPPED | OUTPATIENT
Start: 2023-05-17

## 2023-06-02 ENCOUNTER — TELEMEDICINE (OUTPATIENT)
Dept: FAMILY MEDICINE CLINIC | Facility: CLINIC | Age: 78
End: 2023-06-02

## 2023-06-02 DIAGNOSIS — F41.9 ANXIETY: ICD-10-CM

## 2023-06-02 DIAGNOSIS — M79.7 FIBROMYALGIA: Primary | ICD-10-CM

## 2023-06-02 DIAGNOSIS — R06.02 SHORTNESS OF BREATH: ICD-10-CM

## 2023-06-02 DIAGNOSIS — M79.7 FIBROMYALGIA: ICD-10-CM

## 2023-06-02 DIAGNOSIS — F33.0 MILD EPISODE OF RECURRENT MAJOR DEPRESSIVE DISORDER: ICD-10-CM

## 2023-06-02 PROCEDURE — 1159F MED LIST DOCD IN RCRD: CPT | Performed by: PHYSICIAN ASSISTANT

## 2023-06-02 PROCEDURE — 99213 OFFICE O/P EST LOW 20 MIN: CPT | Performed by: PHYSICIAN ASSISTANT

## 2023-06-02 PROCEDURE — 1160F RVW MEDS BY RX/DR IN RCRD: CPT | Performed by: PHYSICIAN ASSISTANT

## 2023-06-02 RX ORDER — TRAMADOL HYDROCHLORIDE 50 MG/1
50 TABLET ORAL EVERY 6 HOURS PRN
Qty: 120 TABLET | Refills: 2 | Status: SHIPPED | OUTPATIENT
Start: 2023-06-02

## 2023-06-02 RX ORDER — RIVAROXABAN 20 MG/1
TABLET, FILM COATED ORAL
COMMUNITY
Start: 2023-05-18 | End: 2023-06-02 | Stop reason: SDUPTHER

## 2023-06-02 NOTE — PROGRESS NOTES
Subjective   Miguelina Mueller is a 78 y.o. female.   You have chosen to receive care through a telehealth visit.  Do you consent to use a video/audio connection for your medical care today? Yes  Connected through [employer] with her  cell phone and she was at home in her living room and I was at my desk  BMI Readings from Last 1 Encounters:   03/09/23 29.87 kg/m²       History of Present Illness    Since the last visit, she has overall felt tired.  She has Hyperlipidemia with goals met with current Rx, Atrial Fibillation and remains under the care of their cardiologist for management and Vitamin D deficiency and will update labs for continued management.  she has been compliant with current medications have reviewed them.  The patient denies medication side effects.  Will refill medications. LMP  (LMP Unknown)     Results for orders placed or performed in visit on 10/12/21   Comprehensive metabolic panel    Specimen: Blood   Result Value Ref Range    Glucose 105 (H) 65 - 99 mg/dL    BUN 12 8 - 23 mg/dL    Creatinine 0.88 0.57 - 1.00 mg/dL    EGFR Result 68.6 >60.0 mL/min/1.73    BUN/Creatinine Ratio 13.6 7.0 - 25.0    Sodium 141 136 - 145 mmol/L    Potassium 4.4 3.5 - 5.2 mmol/L    Chloride 105 98 - 107 mmol/L    Total CO2 26.4 22.0 - 29.0 mmol/L    Calcium 9.4 8.6 - 10.5 mg/dL    Total Protein 6.1 6.0 - 8.5 g/dL    Albumin 4.00 3.50 - 5.20 g/dL    Globulin 2.1 gm/dL    A/G Ratio 1.9 g/dL    Total Bilirubin 0.6 0.0 - 1.2 mg/dL    Alkaline Phosphatase 107 39 - 117 U/L    AST (SGOT) 16 1 - 32 U/L    ALT (SGPT) 11 1 - 33 U/L   Lipid panel    Specimen: Blood   Result Value Ref Range    Total Cholesterol 178 0 - 200 mg/dL    Triglycerides 137 0 - 150 mg/dL    HDL Cholesterol 51 40 - 60 mg/dL    VLDL Cholesterol Aidan 24 5 - 40 mg/dL    LDL Chol Calc (NIH) 103 (H) 0 - 100 mg/dL   TSH    Specimen: Blood   Result Value Ref Range    TSH 0.966 0.270 - 4.200 uIU/mL   Hemoglobin A1c    Specimen: Blood   Result Value Ref Range     Hemoglobin A1C 5.70 (H) 4.80 - 5.60 %   T3, Free    Specimen: Blood   Result Value Ref Range    T3, Free 3.1 2.0 - 4.4 pg/mL   T4, Free    Specimen: Blood   Result Value Ref Range    Free T4 1.16 0.93 - 1.70 ng/dL   Vitamin B12    Specimen: Blood   Result Value Ref Range    Vitamin B-12 1,483 (H) 211 - 946 pg/mL   Folate    Specimen: Blood   Result Value Ref Range    Folate >20.00 4.78 - 24.20 ng/mL   Vitamin D 25 Hydroxy    Specimen: Blood   Result Value Ref Range    25 Hydroxy, Vitamin D 39.6 30.0 - 100.0 ng/ml   CBC and Differential    Specimen: Blood   Result Value Ref Range    WBC 9.10 3.40 - 10.80 10*3/mm3    RBC 5.28 3.77 - 5.28 10*6/mm3    Hemoglobin 16.1 (H) 12.0 - 15.9 g/dL    Hematocrit 46.8 (H) 34.0 - 46.6 %    MCV 88.6 79.0 - 97.0 fL    MCH 30.5 26.6 - 33.0 pg    MCHC 34.4 31.5 - 35.7 g/dL    RDW 12.7 12.3 - 15.4 %    Platelets 225 140 - 450 10*3/mm3    Neutrophil Rel % 69.7 42.7 - 76.0 %    Lymphocyte Rel % 21.6 19.6 - 45.3 %    Monocyte Rel % 6.0 5.0 - 12.0 %    Eosinophil Rel % 1.5 0.3 - 6.2 %    Basophil Rel % 0.9 0.0 - 1.5 %    Neutrophils Absolute 6.33 1.70 - 7.00 10*3/mm3    Lymphocytes Absolute 1.97 0.70 - 3.10 10*3/mm3    Monocytes Absolute 0.55 0.10 - 0.90 10*3/mm3    Eosinophils Absolute 0.14 0.00 - 0.40 10*3/mm3    Basophils Absolute 0.08 0.00 - 0.20 10*3/mm3    Immature Granulocyte Rel % 0.3 0.0 - 0.5 %    Immature Grans Absolute 0.03 0.00 - 0.05 10*3/mm3    nRBC 0.0 0.0 - 0.2 /100 WBC     9/21/2022  7:09 PM EDT       Hemoglobin A1c and glucose just barely in prediabetes range we will continue to monitor with yearly labs and advise low glycemic index diet.  Thyroid labs are in normal range.  LDL cholesterol is slightly above 100 but she is intolerant to other statins and tried several we will continue same dose pravastatin 80 mg.  Kidney and liver functions in normal range.  Hemoglobin continues to be elevated and I looked at her prior echo from cardiology her right ventricular systolic  pressure was elevated on report----advise evaluation with sleep medicine for obstructive sleep apnea.  I suspect this is contributing to patient's fatigue.  I will place referral if patient agrees?  Other labs okay       Remains on Cymbalta for anxiety and depression does help some and aware that she can see psychiatry for further evaluation and an additional medication consult.  Does help pain some and does still require tramadol 4 times a day for the fibromyalgia pain and does help.  She tolerates it well.  On Ultram for fibro pain and helps some.   The patient has read and signed the [facility] Controlled Substance Contract.  I will continue to see patient for regular follow up appointments.  They are well controlled on their medication.  NATA has been reviewed by me and is updated every 3 months. The patient is aware of the potential for addiction and dependence.  Declined referral to sleep med  -----I also ordered referral to pulm back in March.---still need CT chest also.  Daughter --age 47    The following portions of the patient's history were reviewed and updated as appropriate: allergies, current medications, past family history, past medical history, past social history, past surgical history and problem list.    Review of Systems   Constitutional: Positive for diaphoresis. Negative for activity change, appetite change and unexpected weight change.   HENT: Negative for nosebleeds and trouble swallowing.    Eyes: Negative for pain and visual disturbance.   Respiratory: Positive for shortness of breath. Negative for chest tightness and wheezing.    Cardiovascular: Negative for chest pain and palpitations.   Gastrointestinal: Negative for abdominal pain and blood in stool.   Endocrine: Negative.    Genitourinary: Negative for difficulty urinating and hematuria.   Musculoskeletal: Negative for joint swelling.   Skin: Negative for color change and rash.   Allergic/Immunologic: Negative.     Neurological: Negative for syncope and speech difficulty.   Hematological: Negative for adenopathy.   Psychiatric/Behavioral: Positive for dysphoric mood and sleep disturbance. Negative for agitation and confusion. The patient is nervous/anxious.    All other systems reviewed and are negative.      Objective   Physical Exam  Constitutional:       General: She is not in acute distress.     Appearance: She is well-developed. She is not diaphoretic.   HENT:      Head: Normocephalic and atraumatic.      Right Ear: External ear normal.      Left Ear: External ear normal.   Eyes:      General: No scleral icterus.     Conjunctiva/sclera: Conjunctivae normal.   Pulmonary:      Effort: Pulmonary effort is normal. No respiratory distress.   Musculoskeletal:         General: Normal range of motion.      Cervical back: Normal range of motion.   Skin:     General: Skin is dry.   Neurological:      Mental Status: She is alert and oriented to person, place, and time. Mental status is at baseline.      Coordination: Coordination normal.   Psychiatric:         Mood and Affect: Mood normal.         Behavior: Behavior normal.         Thought Content: Thought content normal.         Judgment: Judgment normal.         Assessment & Plan   Diagnoses and all orders for this visit:    1. Fibromyalgia (Primary)  -     traMADol (ULTRAM) 50 MG tablet; Take 1 tablet by mouth Every 6 (Six) Hours As Needed for Moderate Pain. for pain  Dispense: 120 tablet; Refill: 2    2. Shortness of breath  -     CT Chest Without Contrast Diagnostic    3. Anxiety    4. Mild episode of recurrent major depressive disorder    5. Fibromyalgia  Comments:  Continue Cymbalta and tramadol helped some  Orders:  -     traMADol (ULTRAM) 50 MG tablet; Take 1 tablet by mouth Every 6 (Six) Hours As Needed for Moderate Pain. for pain  Dispense: 120 tablet; Refill: 2      Continue Cymbalta for anxiety and depression does help some and declines referral to psychiatry to  pursue other medications  Cymbalta helps her fibro pain a little and she does take tramadol for the fibro pain and does help  Still want work-up for shortness of breath and cardiology also noted this at her A-fib visit and reordered CT of the chest and she has a referral already to see pulmonologist  She is taking the statin and continue and folic acid we will continue and B12  Declines mammo and DEXA  Will want labs in Sept  Daughter  age 47  Time 12 min

## 2023-07-28 DIAGNOSIS — M79.7 FIBROMYALGIA: ICD-10-CM

## 2023-07-28 RX ORDER — TRAMADOL HYDROCHLORIDE 50 MG/1
TABLET ORAL
Qty: 120 TABLET | Refills: 2 | OUTPATIENT
Start: 2023-07-28

## 2023-08-04 ENCOUNTER — TELEPHONE (OUTPATIENT)
Dept: CARDIOLOGY | Facility: CLINIC | Age: 78
End: 2023-08-04

## 2023-08-04 NOTE — TELEPHONE ENCOUNTER
Caller: Miguelina Mueller    Relationship to patient: Self    Best call back number: [phone]     Type of visit: TESTING    Requested date: NEXT AVAILABLE    Who are you requesting to speak with (clinical staff, provider,  specific staff member): ROLAN    If rescheduling, when is the original appointment: 08.04.2023    Additional notes: PT STATES SHE HAS CALLED AND TRIED TO [facility] TWICE AND APPT IS TODAY - CANCELLED ECHO AND HOLTER IN [facility]IC AND PT REQUESTING A CALL BACK TO [facility] - PT IS ALSO WANTING TO SPEAK WITH DR GARTH GONGORA IF SHE IS AVAILABLE FOR SOME QUESTIONS

## 2023-08-07 NOTE — TELEPHONE ENCOUNTER
Called/spoke to pt.    Please call pt ASAP and reschedule the testing appts she had canceled.  She really needs this done ASAP.    Thanks,  Vicki

## 2023-08-11 ENCOUNTER — TELEPHONE (OUTPATIENT)
Dept: CARDIOLOGY | Facility: CLINIC | Age: 78
End: 2023-08-11

## 2023-08-11 NOTE — TELEPHONE ENCOUNTER
Hub staff attempted to follow warm transfer process and was unsuccessful     Caller: Miguelina Mueller    Relationship to patient: Self    Best call back number: [phone]    Patient is needing: PATIENT HAS CALLED TO RESCHEDULE HEART MONITOR APPOINTMENT. PLEASE CONTACT PATIENT TO SCHEDULE. THANK YOU.

## 2023-08-25 ENCOUNTER — TELEMEDICINE (OUTPATIENT)
Dept: FAMILY MEDICINE CLINIC | Facility: CLINIC | Age: 78
End: 2023-08-25
Payer: MEDICARE

## 2023-08-25 ENCOUNTER — TELEPHONE (OUTPATIENT)
Dept: CARDIOLOGY | Facility: CLINIC | Age: 78
End: 2023-08-25

## 2023-08-25 DIAGNOSIS — I48.0 PAF (PAROXYSMAL ATRIAL FIBRILLATION): ICD-10-CM

## 2023-08-25 DIAGNOSIS — M79.7 FIBROMYALGIA: Primary | ICD-10-CM

## 2023-08-25 DIAGNOSIS — E53.8 B12 DEFICIENCY: ICD-10-CM

## 2023-08-25 DIAGNOSIS — E78.2 MIXED HYPERLIPIDEMIA: ICD-10-CM

## 2023-08-25 DIAGNOSIS — F33.0 MILD EPISODE OF RECURRENT MAJOR DEPRESSIVE DISORDER: ICD-10-CM

## 2023-08-25 DIAGNOSIS — M79.7 FIBROMYALGIA: ICD-10-CM

## 2023-08-25 DIAGNOSIS — E55.9 VITAMIN D DEFICIENCY: ICD-10-CM

## 2023-08-25 DIAGNOSIS — E53.8 LOW FOLIC ACID: ICD-10-CM

## 2023-08-25 DIAGNOSIS — I10 BENIGN ESSENTIAL HTN: ICD-10-CM

## 2023-08-25 DIAGNOSIS — F41.9 ANXIETY: ICD-10-CM

## 2023-08-25 DIAGNOSIS — R73.01 IFG (IMPAIRED FASTING GLUCOSE): ICD-10-CM

## 2023-08-25 PROCEDURE — 1159F MED LIST DOCD IN RCRD: CPT | Performed by: PHYSICIAN ASSISTANT

## 2023-08-25 PROCEDURE — 1160F RVW MEDS BY RX/DR IN RCRD: CPT | Performed by: PHYSICIAN ASSISTANT

## 2023-08-25 PROCEDURE — 99213 OFFICE O/P EST LOW 20 MIN: CPT | Performed by: PHYSICIAN ASSISTANT

## 2023-08-25 RX ORDER — TRAMADOL HYDROCHLORIDE 50 MG/1
50 TABLET ORAL EVERY 6 HOURS PRN
Qty: 120 TABLET | Refills: 2 | Status: SHIPPED | OUTPATIENT
Start: 2023-08-25

## 2023-08-25 RX ORDER — DULOXETIN HYDROCHLORIDE 60 MG/1
60 CAPSULE, DELAYED RELEASE ORAL DAILY
Qty: 30 CAPSULE | Refills: 11 | Status: SHIPPED | OUTPATIENT
Start: 2023-08-25

## 2023-08-25 NOTE — PROGRESS NOTES
Subjective   Miguelina Mueller is a 78 y.o. female.     Anxiety  Symptoms include depressed mood, nervous/anxious behavior and shortness of breath. Patient reports no suicidal ideas.     Her past medical history is significant for depression.   DepressionPatient presents with the following symptoms: depressed mood, nervousness/anxiety and shortness of breath.  Patient is not experiencing: choking sensation and suicidal ideas.      Fibromyalgia  Associated symptoms include arthralgias, myalgias and neck pain. Pertinent negatives include no diaphoresis.   Hyperlipidemia  Associated symptoms include myalgias and shortness of breath.    You have chosen to receive care through a telehealth visit.  Do you consent to use a video/audio connection for your medical care today? Yes  PT in  her living room and I am at my desk  The following portions of the patient's history were reviewed and updated as appropriate: allergies, current medications, past family history, past medical history, past social history, past surgical history, and problem list.   Since the last visit, she has overall felt tired.  She has Primary Hypertension and well controlled on current medication, Impaired fasting glucose and will monitor labs to watch for DMII, Atrial Fibillation and remains under the care of their cardiologist for management, Vitamin D deficiency and will update labs for continued management, and mixed hyperlipidemia she is only tolerant to taking the pravastatin LDL was just above 100 on last year's lab .  she has been compliant with current medications have reviewed them.  The patient denies medication side effects.  Will refill medications. LMP  (LMP Unknown)   Also note patient is due for colon cancer screening, bone density screening, mammogram screening  Results for orders placed or performed in visit on 10/12/21   Comprehensive metabolic panel    Specimen: Blood   Result Value Ref Range    Glucose 105 (H) 65 - 99 mg/dL    BUN 12 8  - 23 mg/dL    Creatinine 0.88 0.57 - 1.00 mg/dL    EGFR Result 68.6 >60.0 mL/min/1.73    BUN/Creatinine Ratio 13.6 7.0 - 25.0    Sodium 141 136 - 145 mmol/L    Potassium 4.4 3.5 - 5.2 mmol/L    Chloride 105 98 - 107 mmol/L    Total CO2 26.4 22.0 - 29.0 mmol/L    Calcium 9.4 8.6 - 10.5 mg/dL    Total Protein 6.1 6.0 - 8.5 g/dL    Albumin 4.00 3.50 - 5.20 g/dL    Globulin 2.1 gm/dL    A/G Ratio 1.9 g/dL    Total Bilirubin 0.6 0.0 - 1.2 mg/dL    Alkaline Phosphatase 107 39 - 117 U/L    AST (SGOT) 16 1 - 32 U/L    ALT (SGPT) 11 1 - 33 U/L   Lipid panel    Specimen: Blood   Result Value Ref Range    Total Cholesterol 178 0 - 200 mg/dL    Triglycerides 137 0 - 150 mg/dL    HDL Cholesterol 51 40 - 60 mg/dL    VLDL Cholesterol Aidan 24 5 - 40 mg/dL    LDL Chol Calc (NIH) 103 (H) 0 - 100 mg/dL   TSH    Specimen: Blood   Result Value Ref Range    TSH 0.966 0.270 - 4.200 uIU/mL   Hemoglobin A1c    Specimen: Blood   Result Value Ref Range    Hemoglobin A1C 5.70 (H) 4.80 - 5.60 %   T3, Free    Specimen: Blood   Result Value Ref Range    T3, Free 3.1 2.0 - 4.4 pg/mL   T4, Free    Specimen: Blood   Result Value Ref Range    Free T4 1.16 0.93 - 1.70 ng/dL   Vitamin B12    Specimen: Blood   Result Value Ref Range    Vitamin B-12 1,483 (H) 211 - 946 pg/mL   Folate    Specimen: Blood   Result Value Ref Range    Folate >20.00 4.78 - 24.20 ng/mL   Vitamin D 25 Hydroxy    Specimen: Blood   Result Value Ref Range    25 Hydroxy, Vitamin D 39.6 30.0 - 100.0 ng/ml   CBC and Differential    Specimen: Blood   Result Value Ref Range    WBC 9.10 3.40 - 10.80 10*3/mm3    RBC 5.28 3.77 - 5.28 10*6/mm3    Hemoglobin 16.1 (H) 12.0 - 15.9 g/dL    Hematocrit 46.8 (H) 34.0 - 46.6 %    MCV 88.6 79.0 - 97.0 fL    MCH 30.5 26.6 - 33.0 pg    MCHC 34.4 31.5 - 35.7 g/dL    RDW 12.7 12.3 - 15.4 %    Platelets 225 140 - 450 10*3/mm3    Neutrophil Rel % 69.7 42.7 - 76.0 %    Lymphocyte Rel % 21.6 19.6 - 45.3 %    Monocyte Rel % 6.0 5.0 - 12.0 %    Eosinophil Rel  % 1.5 0.3 - 6.2 %    Basophil Rel % 0.9 0.0 - 1.5 %    Neutrophils Absolute 6.33 1.70 - 7.00 10*3/mm3    Lymphocytes Absolute 1.97 0.70 - 3.10 10*3/mm3    Monocytes Absolute 0.55 0.10 - 0.90 10*3/mm3    Eosinophils Absolute 0.14 0.00 - 0.40 10*3/mm3    Basophils Absolute 0.08 0.00 - 0.20 10*3/mm3    Immature Granulocyte Rel % 0.3 0.0 - 0.5 %    Immature Grans Absolute 0.03 0.00 - 0.05 10*3/mm3    nRBC 0.0 0.0 - 0.2 /100 WBC     Same SOA    Below summary unchanged. To have echo and Holter at [facility] Dr. Dover's office----patient has rescheduled this.  She will also need to obtain labs mid-September and I will make an order today.  Note her hemoglobin A1c was 5.7 on last labs 2022  Remains on Cymbalta for anxiety and depression does help some and aware that she can see psychiatry for further evaluation and an additional medication consult.  Does help pain some and does still require tramadol 4 times a day for the fibromyalgia pain and does help.  She tolerates it well.  On Ultram for fibro pain and helps some.   The patient has read and signed the [facility] Controlled Substance Contract.  I will continue to see patient for regular follow up appointments.  They are well controlled on their medication.  NATA has been reviewed by me and is updated every 3 months. The patient is aware of the potential for addiction and dependence.  Declined referral to sleep med  -----I also ordered referral to pulm back in March.---still need CT chest also.  Daughter --age 47     2022  7:09 PM EDT       Hemoglobin A1c and glucose just barely in prediabetes range we will continue to monitor with yearly labs and advise low glycemic index diet.  Thyroid labs are in normal range.  LDL cholesterol is slightly above 100 but she is intolerant to other statins and tried several we will continue same dose pravastatin 80 mg.  Kidney and liver functions in normal range.  Hemoglobin continues to be elevated and I  looked at her prior echo from cardiology her right ventricular systolic pressure was elevated on report----advise evaluation with sleep medicine for obstructive sleep apnea.  I suspect this is contributing to patient's fatigue.  I will place referral if patient agrees?  Other labs okay     Review of Systems   Constitutional:  Negative for diaphoresis.   HENT:  Negative for nosebleeds and trouble swallowing.    Eyes:  Negative for blurred vision and visual disturbance.   Respiratory:  Positive for shortness of breath. Negative for choking.    Gastrointestinal:  Negative for blood in stool.   Musculoskeletal:  Positive for arthralgias, back pain, gait problem, myalgias and neck pain.   Allergic/Immunologic: Negative for immunocompromised state.   Neurological:  Negative for facial asymmetry and speech difficulty.   Psychiatric/Behavioral:  Positive for sleep disturbance and depressed mood. Negative for self-injury and suicidal ideas. The patient is nervous/anxious.      Objective   Physical Exam  Constitutional:       General: She is not in acute distress.     Appearance: She is well-developed. She is ill-appearing. She is not diaphoretic.      Comments: frail   HENT:      Head: Normocephalic and atraumatic.   Eyes:      Conjunctiva/sclera: Conjunctivae normal.   Pulmonary:      Effort: Pulmonary effort is normal. No respiratory distress.   Musculoskeletal:         General: Normal range of motion.      Cervical back: Normal range of motion.   Skin:     General: Skin is dry.   Neurological:      Mental Status: She is alert and oriented to person, place, and time.      Coordination: Coordination normal.   Psychiatric:         Behavior: Behavior normal.         Thought Content: Thought content normal.         Judgment: Judgment normal.      Comments: Cymbalta does help her anxiety and depression some and declines further work-up         Assessment & Plan   Diagnoses and all orders for this visit:    1. Fibromyalgia  (Primary)    2. PAF (paroxysmal atrial fibrillation)    3. B12 deficiency    4. Low folic acid    5. Vitamin D deficiency    6. IFG (impaired fasting glucose)    7. Mixed hyperlipidemia    8. Benign essential HTN    9. Anxiety    10. Mild episode of recurrent major depressive disorder        She remains on vitamin D folic acid and B12--we will check lab  Need to follow-up with cardiology for the Holter monitor and the echo and sees Dr. Dover cardiologist for management of A-fib.  She remains on anticoagulation with Xarelto and rate control with diltiazem.  I continue to manage her chronic fibromyalgia pain with the Cymbalta and Ultram.  Helps some---  Labs are due in September made order  Plan, Miguelina Mueller, was seen today.  she was seen for HTN and continue medication, Imparied fasting glucose and plan follow up labs, diet, and exercise, Hyperlipidemia and will continue current medication, Atrial Fibrillation and remains on medication for treatment and is stable, Atrial Fibrillation and remains under the care of their cardiologist for medical management and is stable, and Vitamin D deficiency and will update labs .  She declines screening mammogram, screening for colon cancer, bone density.

## 2023-08-25 NOTE — TELEPHONE ENCOUNTER
a    Caller: ELIZA    Relationship to patient: SELF    Best call back number: [phone]        Type of visit: HOLTER/ECHO        If rescheduling, when is the original appointment: 8/25/23    Additional notes:PT IS SICK AND CALLED TO CANCEL HER ECHO/HOLTER APPT FOR 8/25/23.  SHE WAS TRANSFERRED THIS MORNING AND WAS UNABLE TO LEAVE A VM TO CANCEL. SHE WOULD LIKE A CALL BACK TO RESCHEDULE

## 2023-09-30 LAB
25(OH)D3+25(OH)D2 SERPL-MCNC: 53.3 NG/ML (ref 30–100)
ALBUMIN SERPL-MCNC: 4.7 G/DL (ref 3.5–5.2)
ALBUMIN/GLOB SERPL: 2 G/DL
ALP SERPL-CCNC: 139 U/L (ref 39–117)
ALT SERPL-CCNC: 15 U/L (ref 1–33)
APPEARANCE UR: CLEAR
AST SERPL-CCNC: 19 U/L (ref 1–32)
BACTERIA #/AREA URNS HPF: ABNORMAL /HPF
BASOPHILS # BLD AUTO: 0.11 10*3/MM3 (ref 0–0.2)
BASOPHILS NFR BLD AUTO: 1 % (ref 0–1.5)
BILIRUB SERPL-MCNC: 0.7 MG/DL (ref 0–1.2)
BILIRUB UR QL STRIP: NEGATIVE
BUN SERPL-MCNC: 12 MG/DL (ref 8–23)
BUN/CREAT SERPL: 11.7 (ref 7–25)
CALCIUM SERPL-MCNC: 10.1 MG/DL (ref 8.6–10.5)
CASTS URNS MICRO: ABNORMAL
CHLORIDE SERPL-SCNC: 103 MMOL/L (ref 98–107)
CHOLEST SERPL-MCNC: 205 MG/DL (ref 0–200)
CO2 SERPL-SCNC: 25.8 MMOL/L (ref 22–29)
COLOR UR: YELLOW
CREAT SERPL-MCNC: 1.03 MG/DL (ref 0.57–1)
CRYSTALS URNS MICRO: ABNORMAL
EGFRCR SERPLBLD CKD-EPI 2021: 56.5 ML/MIN/1.73
EOSINOPHIL # BLD AUTO: 0.25 10*3/MM3 (ref 0–0.4)
EOSINOPHIL NFR BLD AUTO: 2.2 % (ref 0.3–6.2)
EPI CELLS #/AREA URNS HPF: ABNORMAL /HPF
ERYTHROCYTE [DISTWIDTH] IN BLOOD BY AUTOMATED COUNT: 13.1 % (ref 12.3–15.4)
FOLATE SERPL-MCNC: >20 NG/ML (ref 4.78–24.2)
GLOBULIN SER CALC-MCNC: 2.3 GM/DL
GLUCOSE SERPL-MCNC: 109 MG/DL (ref 65–99)
GLUCOSE UR QL STRIP: NEGATIVE
HBA1C MFR BLD: 5.6 % (ref 4.8–5.6)
HCT VFR BLD AUTO: 53.7 % (ref 34–46.6)
HDLC SERPL-MCNC: 63 MG/DL (ref 40–60)
HGB BLD-MCNC: 18.3 G/DL (ref 12–15.9)
HGB UR QL STRIP: NEGATIVE
IMM GRANULOCYTES # BLD AUTO: 0.05 10*3/MM3 (ref 0–0.05)
IMM GRANULOCYTES NFR BLD AUTO: 0.4 % (ref 0–0.5)
KETONES UR QL STRIP: NEGATIVE
LDLC SERPL CALC-MCNC: 121 MG/DL (ref 0–100)
LEUKOCYTE ESTERASE UR QL STRIP: ABNORMAL
LYMPHOCYTES # BLD AUTO: 2.48 10*3/MM3 (ref 0.7–3.1)
LYMPHOCYTES NFR BLD AUTO: 22.1 % (ref 19.6–45.3)
MAGNESIUM SERPL-MCNC: 2.1 MG/DL (ref 1.6–2.4)
MCH RBC QN AUTO: 31 PG (ref 26.6–33)
MCHC RBC AUTO-ENTMCNC: 34.1 G/DL (ref 31.5–35.7)
MCV RBC AUTO: 90.9 FL (ref 79–97)
MONOCYTES # BLD AUTO: 0.75 10*3/MM3 (ref 0.1–0.9)
MONOCYTES NFR BLD AUTO: 6.7 % (ref 5–12)
NEUTROPHILS # BLD AUTO: 7.58 10*3/MM3 (ref 1.7–7)
NEUTROPHILS NFR BLD AUTO: 67.6 % (ref 42.7–76)
NITRITE UR QL STRIP: NEGATIVE
NRBC BLD AUTO-RTO: 0 /100 WBC (ref 0–0.2)
PH UR STRIP: 5.5 [PH] (ref 5–8)
PLATELET # BLD AUTO: 236 10*3/MM3 (ref 140–450)
POTASSIUM SERPL-SCNC: 4.3 MMOL/L (ref 3.5–5.2)
PROT SERPL-MCNC: 7 G/DL (ref 6–8.5)
PROT UR QL STRIP: NEGATIVE
RBC # BLD AUTO: 5.91 10*6/MM3 (ref 3.77–5.28)
RBC #/AREA URNS HPF: ABNORMAL /HPF
SODIUM SERPL-SCNC: 141 MMOL/L (ref 136–145)
SP GR UR STRIP: 1.02 (ref 1–1.03)
T4 FREE SERPL-MCNC: 1.24 NG/DL (ref 0.93–1.7)
TRIGL SERPL-MCNC: 121 MG/DL (ref 0–150)
TSH SERPL DL<=0.005 MIU/L-ACNC: 3.11 UIU/ML (ref 0.27–4.2)
UROBILINOGEN UR STRIP-MCNC: ABNORMAL MG/DL
VIT B12 SERPL-MCNC: 1341 PG/ML (ref 211–946)
VLDLC SERPL CALC-MCNC: 21 MG/DL (ref 5–40)
WBC # BLD AUTO: 11.22 10*3/MM3 (ref 3.4–10.8)
WBC #/AREA URNS HPF: ABNORMAL /HPF

## 2023-10-24 DIAGNOSIS — I48.0 PAF (PAROXYSMAL ATRIAL FIBRILLATION): Primary | ICD-10-CM

## 2023-11-06 ENCOUNTER — HOSPITAL ENCOUNTER (OUTPATIENT)
Dept: CARDIOLOGY | Facility: HOSPITAL | Age: 78
Discharge: HOME | End: 2023-11-06
Admitting: INTERNAL MEDICINE
Payer: MEDICARE

## 2023-11-06 VITALS
HEART RATE: 70 BPM | BODY MASS INDEX: 29.7 KG/M2 | HEIGHT: 64 IN | DIASTOLIC BLOOD PRESSURE: 60 MMHG | SYSTOLIC BLOOD PRESSURE: 138 MMHG | WEIGHT: 173.94 LBS

## 2023-11-06 DIAGNOSIS — I48.0 PAF (PAROXYSMAL ATRIAL FIBRILLATION): ICD-10-CM

## 2023-11-06 LAB
AORTIC ARCH: 1.9 CM
AORTIC DIMENSIONLESS INDEX: 0.6 (DI)
ASCENDING AORTA: 2.5 CM
BH CV ECHO MEAS - ACS: 1.48 CM
BH CV ECHO MEAS - AO MAX PG: 7.3 MMHG
BH CV ECHO MEAS - AO MEAN PG: 4.3 MMHG
BH CV ECHO MEAS - AO ROOT DIAM: 2.7 CM
BH CV ECHO MEAS - AO V2 MAX: 134.8 CM/SEC
BH CV ECHO MEAS - AO V2 VTI: 25.3 CM
BH CV ECHO MEAS - AVA(I,D): 1.39 CM2
BH CV ECHO MEAS - EDV(CUBED): 67.1 ML
BH CV ECHO MEAS - EDV(MOD-SP2): 64 ML
BH CV ECHO MEAS - EDV(MOD-SP4): 55 ML
BH CV ECHO MEAS - EF(MOD-BP): 60.2 %
BH CV ECHO MEAS - EF(MOD-SP2): 59.4 %
BH CV ECHO MEAS - EF(MOD-SP4): 58.2 %
BH CV ECHO MEAS - ESV(CUBED): 27.3 ML
BH CV ECHO MEAS - ESV(MOD-SP2): 26 ML
BH CV ECHO MEAS - ESV(MOD-SP4): 23 ML
BH CV ECHO MEAS - FS: 25.9 %
BH CV ECHO MEAS - IVS/LVPW: 1.03 CM
BH CV ECHO MEAS - IVSD: 1.31 CM
BH CV ECHO MEAS - LAT PEAK E' VEL: 6.5 CM/SEC
BH CV ECHO MEAS - LV DIASTOLIC VOL/BSA (35-75): 29.8 CM2
BH CV ECHO MEAS - LV MASS(C)D: 187.3 GRAMS
BH CV ECHO MEAS - LV MAX PG: 2.8 MMHG
BH CV ECHO MEAS - LV MEAN PG: 1.58 MMHG
BH CV ECHO MEAS - LV SYSTOLIC VOL/BSA (12-30): 12.5 CM2
BH CV ECHO MEAS - LV V1 MAX: 83.1 CM/SEC
BH CV ECHO MEAS - LV V1 VTI: 14.9 CM
BH CV ECHO MEAS - LVIDD: 4.1 CM
BH CV ECHO MEAS - LVIDS: 3 CM
BH CV ECHO MEAS - LVOT AREA: 2.36 CM2
BH CV ECHO MEAS - LVOT DIAM: 1.73 CM
BH CV ECHO MEAS - LVPWD: 1.26 CM
BH CV ECHO MEAS - MED PEAK E' VEL: 6.4 CM/SEC
BH CV ECHO MEAS - MV DEC SLOPE: 698 CM/SEC2
BH CV ECHO MEAS - MV DEC TIME: 0.11 SEC
BH CV ECHO MEAS - MV E MAX VEL: 111 CM/SEC
BH CV ECHO MEAS - MV MAX PG: 6.4 MMHG
BH CV ECHO MEAS - MV MEAN PG: 1.88 MMHG
BH CV ECHO MEAS - MV P1/2T: 54.4 MSEC
BH CV ECHO MEAS - MV V2 VTI: 27.5 CM
BH CV ECHO MEAS - MVA(P1/2T): 4 CM2
BH CV ECHO MEAS - MVA(VTI): 1.28 CM2
BH CV ECHO MEAS - PA ACC TIME: 0.11 SEC
BH CV ECHO MEAS - PA V2 MAX: 99.9 CM/SEC
BH CV ECHO MEAS - QP/QS: 1.08
BH CV ECHO MEAS - RAP SYSTOLE: 8 MMHG
BH CV ECHO MEAS - RV MAX PG: 1.98 MMHG
BH CV ECHO MEAS - RV V1 MAX: 70.3 CM/SEC
BH CV ECHO MEAS - RV V1 VTI: 11.9 CM
BH CV ECHO MEAS - RVOT DIAM: 2.02 CM
BH CV ECHO MEAS - RVSP: 43 MMHG
BH CV ECHO MEAS - SI(MOD-SP2): 20.6 ML/M2
BH CV ECHO MEAS - SI(MOD-SP4): 17.3 ML/M2
BH CV ECHO MEAS - SUP REN AO DIAM: 2 CM
BH CV ECHO MEAS - SV(LVOT): 35.1 ML
BH CV ECHO MEAS - SV(MOD-SP2): 38 ML
BH CV ECHO MEAS - SV(MOD-SP4): 32 ML
BH CV ECHO MEAS - SV(RVOT): 38 ML
BH CV ECHO MEAS - TAPSE (>1.6): 1.26 CM
BH CV ECHO MEAS - TR MAX PG: 35 MMHG
BH CV ECHO MEAS - TR MAX VEL: 295.6 CM/SEC
BH CV ECHO MEASUREMENTS AVERAGE E/E' RATIO: 17.21
BH CV XLRA - RV BASE: 3.6 CM
BH CV XLRA - RV LENGTH: 6.3 CM
BH CV XLRA - RV MID: 2.7 CM
BH CV XLRA - TDI S': 6.8 CM/SEC
LEFT ATRIUM VOLUME INDEX: 38.8 ML/M2
SINUS: 2.4 CM
STJ: 1.66 CM

## 2023-11-06 PROCEDURE — 93306 TTE W/DOPPLER COMPLETE: CPT

## 2023-11-06 PROCEDURE — 93306 TTE W/DOPPLER COMPLETE: CPT | Performed by: INTERNAL MEDICINE

## 2023-11-17 ENCOUNTER — TELEMEDICINE (OUTPATIENT)
Dept: FAMILY MEDICINE CLINIC | Facility: CLINIC | Age: 78
End: 2023-11-17
Payer: MEDICARE

## 2023-11-17 DIAGNOSIS — M79.7 FIBROMYALGIA: Chronic | ICD-10-CM

## 2023-11-17 DIAGNOSIS — I48.0 PAF (PAROXYSMAL ATRIAL FIBRILLATION): ICD-10-CM

## 2023-11-17 DIAGNOSIS — E78.2 MIXED HYPERLIPIDEMIA: Primary | ICD-10-CM

## 2023-11-17 PROCEDURE — 1160F RVW MEDS BY RX/DR IN RCRD: CPT | Performed by: PHYSICIAN ASSISTANT

## 2023-11-17 PROCEDURE — 99214 OFFICE O/P EST MOD 30 MIN: CPT | Performed by: PHYSICIAN ASSISTANT

## 2023-11-17 PROCEDURE — 1159F MED LIST DOCD IN RCRD: CPT | Performed by: PHYSICIAN ASSISTANT

## 2023-11-17 RX ORDER — TRAMADOL HYDROCHLORIDE 50 MG/1
50 TABLET ORAL EVERY 6 HOURS PRN
Qty: 120 TABLET | Refills: 2 | Status: SHIPPED | OUTPATIENT
Start: 2023-11-17

## 2023-11-17 RX ORDER — ROSUVASTATIN CALCIUM 20 MG/1
20 TABLET, COATED ORAL DAILY
Qty: 30 TABLET | Refills: 11 | Status: SHIPPED | OUTPATIENT
Start: 2023-11-17

## 2023-11-17 NOTE — PROGRESS NOTES
Subjective   Miguelina Mueller is a 78 y.o. female.     Fibromyalgia  Pertinent negatives include no diaphoresis.    You have chosen to receive care through a telehealth visit.  Do you consent to use a video/audio connection for your medical care today? Yes    Patient was at home in her living room and I was at my desk   Since the last visit, she has overall felt tired.  She has Atrial Fibillation and remains under the care of their cardiologist for management, Vitamin D deficiency and labs are at goal >30 ng/mL, and mixed hyperlipidemia cholesterol not at goal and will change to generic Crestor patient declines mammogram and bone density screening also colon cancer screening .  she has been compliant with current medications have reviewed them.  The patient denies medication side effects.  Will refill medications. LMP  (LMP Unknown)   The patient has read and signed the [facility] Controlled Substance Contract.  I will continue to see patient for regular follow up appointments.  They are well controlled on their medication.  NATA has been reviewed by me and is updated every 3 months. The patient is aware of the potential for addiction and dependence.    Hospital Outpatient Visit on 11/06/2023   Component Date Value Ref Range Status    EF(MOD-bp) 11/06/2023 60.2  % Final    LVIDd 11/06/2023 4.1  cm Final    LVIDs 11/06/2023 3.0  cm Final    IVSd 11/06/2023 1.31  cm Final    LVPWd 11/06/2023 1.26  cm Final    FS 11/06/2023 25.9  % Final    IVS/LVPW 11/06/2023 1.03  cm Final    ESV(cubed) 11/06/2023 27.3  ml Final    LV Sys Vol (BSA corrected) 11/06/2023 12.5  cm2 Final    EDV(cubed) 11/06/2023 67.1  ml Final    LV Keane Vol (BSA corrected) 11/06/2023 29.8  cm2 Final    LV mass(C)d 11/06/2023 187.3  grams Final    LVOT area 11/06/2023 2.36  cm2 Final    LVOT diam 11/06/2023 1.73  cm Final    EDV(MOD-sp2) 11/06/2023 64.0  ml Final    EDV(MOD-sp4) 11/06/2023 55.0  ml Final    ESV(MOD-sp2) 11/06/2023 26.0  ml Final     ESV(MOD-sp4) 11/06/2023 23.0  ml Final    SV(MOD-sp2) 11/06/2023 38.0  ml Final    SV(MOD-sp4) 11/06/2023 32.0  ml Final    SI(MOD-sp2) 11/06/2023 20.6  ml/m2 Final    SI(MOD-sp4) 11/06/2023 17.3  ml/m2 Final    EF(MOD-sp2) 11/06/2023 59.4  % Final    EF(MOD-sp4) 11/06/2023 58.2  % Final    MV E max oscar 11/06/2023 111.0  cm/sec Final    MV dec time 11/06/2023 0.11  sec Final    LA ESV Index (BP) 11/06/2023 38.8  ml/m2 Final    Med Peak E' Oscar 11/06/2023 6.4  cm/sec Final    Lat Peak E' Oscar 11/06/2023 6.5  cm/sec Final    Avg E/e' ratio 11/06/2023 17.21   Final    SV(LVOT) 11/06/2023 35.1  ml Final    SV(RVOT) 11/06/2023 38.0  ml Final    Qp/Qs 11/06/2023 1.08   Final    RV Base 11/06/2023 3.6  cm Final    RV Mid 11/06/2023 2.7  cm Final    RV Length 11/06/2023 6.3  cm Final    TAPSE (>1.6) 11/06/2023 1.26  cm Final    RV S' 11/06/2023 6.8  cm/sec Final    LV V1 max 11/06/2023 83.1  cm/sec Final    LV V1 max PG 11/06/2023 2.8  mmHg Final    LV V1 mean PG 11/06/2023 1.58  mmHg Final    LV V1 VTI 11/06/2023 14.9  cm Final    Ao pk oscar 11/06/2023 134.8  cm/sec Final    Ao max PG 11/06/2023 7.3  mmHg Final    Ao mean PG 11/06/2023 4.3  mmHg Final    Ao V2 VTI 11/06/2023 25.3  cm Final    JOYA(I,D) 11/06/2023 1.39  cm2 Final    MV max PG 11/06/2023 6.4  mmHg Final    MV mean PG 11/06/2023 1.88  mmHg Final    MV V2 VTI 11/06/2023 27.5  cm Final    MV P1/2t 11/06/2023 54.4  msec Final    MVA(P1/2t) 11/06/2023 4.0  cm2 Final    MVA(VTI) 11/06/2023 1.28  cm2 Final    MV dec slope 11/06/2023 698.0  cm/sec2 Final    TR max oscar 11/06/2023 295.6  cm/sec Final    TR max PG 11/06/2023 35.0  mmHg Final    RVSP(TR) 11/06/2023 43.0  mmHg Final    RAP systole 11/06/2023 8.0  mmHg Final    RVOT diam 11/06/2023 2.02  cm Final    RV V1 max PG 11/06/2023 1.98  mmHg Final    RV V1 max 11/06/2023 70.3  cm/sec Final    RV V1 VTI 11/06/2023 11.9  cm Final    PA V2 max 11/06/2023 99.9  cm/sec Final    PA acc time 11/06/2023 0.11  sec Final    Ao  root diam 11/06/2023 2.7  cm Final    ACS 11/06/2023 1.48  cm Final    Sinus 11/06/2023 2.40  cm Final    STJ 11/06/2023 1.66  cm Final    Dimensionless Index 11/06/2023 0.60  (DI) Final    Ascending aorta 11/06/2023 2.5  cm Final    Aortic arch 11/06/2023 1.9  cm Final    Abdo Ao Diam 11/06/2023 2.0  cm Final   Telemedicine on 08/25/2023   Component Date Value Ref Range Status    Glucose 09/29/2023 109 (H)  65 - 99 mg/dL Final    BUN 09/29/2023 12  8 - 23 mg/dL Final    Creatinine 09/29/2023 1.03 (H)  0.57 - 1.00 mg/dL Final    EGFR Result 09/29/2023 56.5 (L)  >60.0 mL/min/1.73 Final    Comment: GFR Normal >60  Chronic Kidney Disease <60  Kidney Failure <15  The GFR formula is only valid for adults with stable renal  function between ages 18 and 70.      BUN/Creatinine Ratio 09/29/2023 11.7  7.0 - 25.0 Final    Sodium 09/29/2023 141  136 - 145 mmol/L Final    Potassium 09/29/2023 4.3  3.5 - 5.2 mmol/L Final    Chloride 09/29/2023 103  98 - 107 mmol/L Final    Total CO2 09/29/2023 25.8  22.0 - 29.0 mmol/L Final    Calcium 09/29/2023 10.1  8.6 - 10.5 mg/dL Final    Total Protein 09/29/2023 7.0  6.0 - 8.5 g/dL Final    Albumin 09/29/2023 4.7  3.5 - 5.2 g/dL Final    Globulin 09/29/2023 2.3  gm/dL Final    A/G Ratio 09/29/2023 2.0  g/dL Final    Total Bilirubin 09/29/2023 0.7  0.0 - 1.2 mg/dL Final    Alkaline Phosphatase 09/29/2023 139 (H)  39 - 117 U/L Final    AST (SGOT) 09/29/2023 19  1 - 32 U/L Final    ALT (SGPT) 09/29/2023 15  1 - 33 U/L Final    Total Cholesterol 09/29/2023 205 (H)  0 - 200 mg/dL Final    Comment: Cholesterol Reference Ranges  (U.S. Department of Health and Human Services ATP III  Classifications)  Desirable          <200 mg/dL  Borderline High    200-239 mg/dL  High Risk          >240 mg/dL  Triglyceride Reference Ranges  (U.S. Department of Health and Human Services ATP III  Classifications)  Normal           <150 mg/dL  Borderline High  150-199 mg/dL  High             200-499 mg/dL  Very  High        >500 mg/dL  HDL Reference Ranges  (U.S. Department of Health and Human Services ATP III  Classifications)  Low     <40 mg/dl (major risk factor for CHD)  High    >60 mg/dl ('negative' risk factor for CHD)  LDL Reference Ranges  (U.S. Department of Health and Human Services ATP III  Classifications)  Optimal          <100 mg/dL  Near Optimal     100-129 mg/dL  Borderline High  130-159 mg/dL  High             160-189 mg/dL  Very High        >189 mg/dL      Triglycerides 09/29/2023 121  0 - 150 mg/dL Final    HDL Cholesterol 09/29/2023 63 (H)  40 - 60 mg/dL Final    VLDL Cholesterol Aidan 09/29/2023 21  5 - 40 mg/dL Final    LDL Chol Calc (NIH) 09/29/2023 121 (H)  0 - 100 mg/dL Final    WBC 09/29/2023 11.22 (H)  3.40 - 10.80 10*3/mm3 Final    RBC 09/29/2023 5.91 (H)  3.77 - 5.28 10*6/mm3 Final    Hemoglobin 09/29/2023 18.3 (H)  12.0 - 15.9 g/dL Final    Hematocrit 09/29/2023 53.7 (H)  34.0 - 46.6 % Final    MCV 09/29/2023 90.9  79.0 - 97.0 fL Final    MCH 09/29/2023 31.0  26.6 - 33.0 pg Final    MCHC 09/29/2023 34.1  31.5 - 35.7 g/dL Final    RDW 09/29/2023 13.1  12.3 - 15.4 % Final    Platelets 09/29/2023 236  140 - 450 10*3/mm3 Final    Neutrophil Rel % 09/29/2023 67.6  42.7 - 76.0 % Final    Lymphocyte Rel % 09/29/2023 22.1  19.6 - 45.3 % Final    Monocyte Rel % 09/29/2023 6.7  5.0 - 12.0 % Final    Eosinophil Rel % 09/29/2023 2.2  0.3 - 6.2 % Final    Basophil Rel % 09/29/2023 1.0  0.0 - 1.5 % Final    Neutrophils Absolute 09/29/2023 7.58 (H)  1.70 - 7.00 10*3/mm3 Final    Lymphocytes Absolute 09/29/2023 2.48  0.70 - 3.10 10*3/mm3 Final    Monocytes Absolute 09/29/2023 0.75  0.10 - 0.90 10*3/mm3 Final    Eosinophils Absolute 09/29/2023 0.25  0.00 - 0.40 10*3/mm3 Final    Basophils Absolute 09/29/2023 0.11  0.00 - 0.20 10*3/mm3 Final    Immature Granulocyte Rel % 09/29/2023 0.4  0.0 - 0.5 % Final    Immature Grans Absolute 09/29/2023 0.05  0.00 - 0.05 10*3/mm3 Final    nRBC 09/29/2023 0.0  0.0 - 0.2 /100  WBC Final    TSH 09/29/2023 3.110  0.270 - 4.200 uIU/mL Final    Hemoglobin A1C 09/29/2023 5.60  4.80 - 5.60 % Final    Comment: Hemoglobin A1C Ranges:  Increased Risk for Diabetes  5.7% to 6.4%  Diabetes                     >= 6.5%  Diabetic Goal                < 7.0%      Magnesium 09/29/2023 2.1  1.6 - 2.4 mg/dL Final    Vitamin B-12 09/29/2023 1,341 (H)  211 - 946 pg/mL Final    Results may be falsely increased if patient taking Biotin.    Folate 09/29/2023 >20.00  4.78 - 24.20 ng/mL Final    Results may be falsely increased if patient taking Biotin.    25 Hydroxy, Vitamin D 09/29/2023 53.3  30.0 - 100.0 ng/mL Final    Comment: Reference Range for Total Vitamin D 25(OH)  Deficiency <20.0 ng/mL  Insufficiency 21-29 ng/mL  Sufficiency  ng/mL  Toxicity >100 ng/ml      Specific Gravity, UA 09/29/2023 1.017  1.005 - 1.030 Final    pH, UA 09/29/2023 5.5  5.0 - 8.0 Final    Color, UA 09/29/2023 Yellow   Final    REFERENCE RANGE: Yellow, Straw    Appearance, UA 09/29/2023 Clear  Clear Final    Leukocytes, UA 09/29/2023 See below: (A)  Negative Final    Small (1+)    Protein 09/29/2023 Negative  Negative Final    Glucose, UA 09/29/2023 Negative  Negative Final    Ketones 09/29/2023 Negative  Negative Final    Blood, UA 09/29/2023 Negative  Negative Final    Bilirubin, UA 09/29/2023 Negative  Negative Final    Urobilinogen, UA 09/29/2023 Comment   Final    Comment: 0.2 E.U./dL  REFERENCE RANGE: 0.2 - 1.0 E.U./dL      Nitrite, UA 09/29/2023 Negative  Negative Final    Free T4 09/29/2023 1.24  0.93 - 1.70 ng/dL Final    Results may be falsely increased if patient taking Biotin.    WBC, UA 09/29/2023 13-20 (A)  /HPF Final    REFERENCE RANGE: None Seen, 0-2    RBC, UA 09/29/2023 0-2  /HPF Final    REFERENCE RANGE: None Seen, 0-2    Epithelial Cells (non renal) 09/29/2023 7-12 (A)  /HPF Final    REFERENCE RANGE: None Seen, 0-2    Cast Type 09/29/2023 Comment   Final    HYALINE CASTS, UA            0-2              /LPF        None Seen  N    Crystal Type 09/29/2023 Comment   Final    CALCIUM OXALATE CRYSTALS     Small/1+         /HPF       None Seen  N    Bacteria, UA 09/29/2023 4+ (A)  None Seen /HPF Final   As I noted last telehealth visit on 8/25/2023 her anxiety and depression are still present the Cymbalta does help-----she still gets breakthrough symptoms but does not want to pursue any further work-up  I continue to treat her with tramadol for fibromyalgia pain that also helps     I noted her hemoglobin was elevated and she is a prior smoker and her white count is in baselines and stays elevated---snores---declines work up for obst sleep apnea    Sees Dr Dover for a fib      The following portions of the patient's history were reviewed and updated as appropriate: allergies, current medications, past family history, past medical history, past social history, past surgical history, and problem list.    Review of Systems   Constitutional:  Negative for diaphoresis.   HENT:  Negative for nosebleeds and trouble swallowing.    Eyes:  Negative for blurred vision and visual disturbance.   Respiratory:  Negative for choking.    Gastrointestinal:  Negative for blood in stool.   Allergic/Immunologic: Negative for immunocompromised state.   Neurological:  Negative for facial asymmetry and speech difficulty.   Psychiatric/Behavioral:  Negative for self-injury and suicidal ideas.        Objective   Physical Exam  Constitutional:       General: She is not in acute distress.     Appearance: She is well-developed. She is not diaphoretic.   HENT:      Head: Normocephalic and atraumatic.   Eyes:      Conjunctiva/sclera: Conjunctivae normal.   Pulmonary:      Effort: Pulmonary effort is normal. No respiratory distress.   Musculoskeletal:         General: Normal range of motion.      Cervical back: Normal range of motion.   Skin:     General: Skin is dry.   Neurological:      Mental Status: She is alert and oriented to person, place, and time.       Coordination: Coordination normal.   Psychiatric:         Behavior: Behavior normal.         Thought Content: Thought content normal.         Judgment: Judgment normal.           Assessment & Plan   Diagnoses and all orders for this visit:    1. Mixed hyperlipidemia (Primary)    2. PAF (paroxysmal atrial fibrillation)    3. Fibromyalgia  Comments:  Continue Cymbalta and tramadol helped some  Orders:  -     traMADol (ULTRAM) 50 MG tablet; Take 1 tablet by mouth Every 6 (Six) Hours As Needed for Moderate Pain. for pain  Dispense: 120 tablet; Refill: 2    Other orders  -     rosuvastatin (Crestor) 20 MG tablet; Take 1 tablet by mouth Daily. For cholesterol and stop taking pravastatin  Dispense: 30 tablet; Refill: 11      LDL cholesterol was not at goal on her labs from September and I will change the pravastatin 80 mg to generic Crestor 20 mg daily  Patient is aware of that her hemoglobin was mildly elevated and her white blood cell count is also mildly elevated again on labs from September--- declines referral for   2 sleep medicine to evaluate her snoring for obstructive sleep apnea.  She is aware I can also refer her to hematology for further work-up of her mildly elevated white blood cell count---declines  ..  Continue tramadol for fibromyalgia pain does help and tolerates well  We will follow-up with Dr. Dover cardiologist for management of her A-fib and remains on several medications through cardiology  Labs were in September we will continue folic acid, B12 and vitamin   D  Plan follow-up labs 3 months

## 2024-01-15 DIAGNOSIS — M79.7 FIBROMYALGIA: Chronic | ICD-10-CM

## 2024-01-15 RX ORDER — TRAMADOL HYDROCHLORIDE 50 MG/1
50 TABLET ORAL EVERY 6 HOURS PRN
Qty: 120 TABLET | Refills: 2 | OUTPATIENT
Start: 2024-01-15

## 2024-01-15 NOTE — TELEPHONE ENCOUNTER
Caller: Miguelina Mueller    Relationship: Self    Best call back number: [phone]  Requested Prescriptions:   Requested Prescriptions     Pending Prescriptions Disp Refills    traMADol (ULTRAM) 50 MG tablet 120 tablet 2     Sig: Take 1 tablet by mouth Every 6 (Six) Hours As Needed for Moderate Pain. for pain        Pharmacy where request should be sent: [facility] - [place], [place] - [address] [facility] - [phone]  - [phone]      Last office visit with prescribing clinician: 3/31/2022   Last telemedicine visit with prescribing clinician: 11/17/2023   Next office visit with prescribing clinician: Visit date not found     Additional details provided by patient: NEEDS THIS TODAY !     Does the patient have less than a 3 day supply:  [x] Yes  [] No    Would you like a call back once the refill request has been completed: [x] Yes [] No    If the office needs to give you a call back, can they leave a voicemail: [x] Yes [] No    [employer] Rep   01/15/24 08:19 EST

## 2024-01-24 ENCOUNTER — TELEPHONE (OUTPATIENT)
Dept: FAMILY MEDICINE CLINIC | Facility: CLINIC | Age: 79
End: 2024-01-24
Payer: MEDICARE

## 2024-01-27 NOTE — TELEPHONE ENCOUNTER
Caller: Miguelina Mueller    Relationship: Self    Best call back number: [phone]    What is the best time to reach you: ANY, ASAP    Who are you requesting to speak with (clinical staff, provider,  specific staff member): KIRSTY    Do you know the name of the person who called: KIRSTY    What was the call regarding: SHE WAS CALLING BACK TO MAKE THE VIDEO VISIT    Is it okay if the provider responds through MyChart: NO        
Appointment has been scheduled per patients request.   
Hub staff attempted to follow warm transfer process and was unsuccessful     Caller: Miguelina Mueller    Relationship to patient: Self    Best call back number: [phone]    Patient is needing: THE PATIENT WOULD LIKE TO SET-UP A MYCHART VIDEO VISIT WITH VALENTE KUMARI FOR 02/12/2024. THIS APPOINTMENT WILL BE FOR MED REFILLS. THE HUB COULD NOT FIND ANY OPENINGS AVAILABLE FOR THIS VISIT. THE PATIENT HAS DONE MYCHART VISITS WITH HANNAH KUMARI IN THE PAST. PLEASE ADVISE.   
I called patient and left a voicemail for her to call me back to schedule her appointment,   
I called patient back to help her schedule her video visit appt and no answer.  LVM for her to call back  
It is always okay to let this patient have a video visit in 1 slot appointment is fine and can be in an acute slot  
[id number]

## 2024-02-02 RX ORDER — RIVAROXABAN 20 MG/1
20 TABLET, FILM COATED ORAL DAILY
Qty: 30 TABLET | Refills: 11 | OUTPATIENT
Start: 2024-02-02

## 2024-02-06 ENCOUNTER — TELEPHONE (OUTPATIENT)
Dept: FAMILY MEDICINE CLINIC | Facility: CLINIC | Age: 79
End: 2024-02-06
Payer: MEDICARE

## 2024-02-06 NOTE — TELEPHONE ENCOUNTER
Caller: Miguelina Mueller    Relationship: Self    Best call back number: [phone] PLEASE LEAVE A MESSAGE     What orders are you requesting (i.e. lab or imaging): LABS FOR 3 MONTH FOLLOW UP     In what timeframe would the patient need to come in: ASAP    Where will you receive your lab/imaging services: IN OFFICE    Additional notes: PLEASE CALL TO SCHEDULE PATIENT ONCE ORDERS HAVE BEEN PLACED.

## 2024-02-07 ENCOUNTER — TELEPHONE (OUTPATIENT)
Dept: FAMILY MEDICINE CLINIC | Facility: CLINIC | Age: 79
End: 2024-02-07
Payer: MEDICARE

## 2024-02-07 DIAGNOSIS — M79.7 FIBROMYALGIA: Primary | ICD-10-CM

## 2024-02-07 DIAGNOSIS — E78.2 MIXED HYPERLIPIDEMIA: ICD-10-CM

## 2024-02-07 DIAGNOSIS — E55.9 VITAMIN D DEFICIENCY: ICD-10-CM

## 2024-02-07 DIAGNOSIS — E53.8 B12 DEFICIENCY: ICD-10-CM

## 2024-02-07 DIAGNOSIS — E53.8 LOW FOLIC ACID: ICD-10-CM

## 2024-02-07 DIAGNOSIS — R73.01 IFG (IMPAIRED FASTING GLUCOSE): ICD-10-CM

## 2024-02-07 DIAGNOSIS — I48.0 PAF (PAROXYSMAL ATRIAL FIBRILLATION): ICD-10-CM

## 2024-02-09 LAB
25(OH)D3+25(OH)D2 SERPL-MCNC: 41.9 NG/ML (ref 30–100)
ALBUMIN SERPL-MCNC: 4.2 G/DL (ref 3.8–4.8)
ALBUMIN/GLOB SERPL: 1.8 {RATIO} (ref 1.2–2.2)
ALP SERPL-CCNC: 123 IU/L (ref 44–121)
ALT SERPL-CCNC: 14 IU/L (ref 0–32)
AST SERPL-CCNC: 22 IU/L (ref 0–40)
BASOPHILS # BLD AUTO: 0.1 X10E3/UL (ref 0–0.2)
BASOPHILS NFR BLD AUTO: 1 %
BILIRUB SERPL-MCNC: 1 MG/DL (ref 0–1.2)
BUN SERPL-MCNC: 11 MG/DL (ref 8–27)
BUN/CREAT SERPL: 10 (ref 12–28)
CALCIUM SERPL-MCNC: 9.6 MG/DL (ref 8.7–10.3)
CHLORIDE SERPL-SCNC: 102 MMOL/L (ref 96–106)
CHOLEST SERPL-MCNC: 152 MG/DL (ref 100–199)
CO2 SERPL-SCNC: 21 MMOL/L (ref 20–29)
CREAT SERPL-MCNC: 1.15 MG/DL (ref 0.57–1)
EGFRCR SERPLBLD CKD-EPI 2021: 49 ML/MIN/1.73
EOSINOPHIL # BLD AUTO: 0.3 X10E3/UL (ref 0–0.4)
EOSINOPHIL NFR BLD AUTO: 2 %
ERYTHROCYTE [DISTWIDTH] IN BLOOD BY AUTOMATED COUNT: 13 % (ref 11.7–15.4)
FOLATE SERPL-MCNC: >20 NG/ML
GLOBULIN SER CALC-MCNC: 2.3 G/DL (ref 1.5–4.5)
GLUCOSE SERPL-MCNC: 94 MG/DL (ref 70–99)
HBA1C MFR BLD: 5.7 % (ref 4.8–5.6)
HCT VFR BLD AUTO: 51.3 % (ref 34–46.6)
HDLC SERPL-MCNC: 62 MG/DL
HGB BLD-MCNC: 17.2 G/DL (ref 11.1–15.9)
IMM GRANULOCYTES # BLD AUTO: 0 X10E3/UL (ref 0–0.1)
IMM GRANULOCYTES NFR BLD AUTO: 0 %
LDLC SERPL CALC-MCNC: 69 MG/DL (ref 0–99)
LYMPHOCYTES # BLD AUTO: 3.7 X10E3/UL (ref 0.7–3.1)
LYMPHOCYTES NFR BLD AUTO: 31 %
MCH RBC QN AUTO: 30.7 PG (ref 26.6–33)
MCHC RBC AUTO-ENTMCNC: 33.5 G/DL (ref 31.5–35.7)
MCV RBC AUTO: 91 FL (ref 79–97)
MONOCYTES # BLD AUTO: 1 X10E3/UL (ref 0.1–0.9)
MONOCYTES NFR BLD AUTO: 8 %
NEUTROPHILS # BLD AUTO: 7 X10E3/UL (ref 1.4–7)
NEUTROPHILS NFR BLD AUTO: 58 %
PLATELET # BLD AUTO: 219 X10E3/UL (ref 150–450)
POTASSIUM SERPL-SCNC: 4.1 MMOL/L (ref 3.5–5.2)
PROT SERPL-MCNC: 6.5 G/DL (ref 6–8.5)
RBC # BLD AUTO: 5.61 X10E6/UL (ref 3.77–5.28)
SODIUM SERPL-SCNC: 141 MMOL/L (ref 134–144)
TRIGL SERPL-MCNC: 118 MG/DL (ref 0–149)
TSH SERPL DL<=0.005 MIU/L-ACNC: 4.35 UIU/ML (ref 0.45–4.5)
VIT B12 SERPL-MCNC: 1389 PG/ML (ref 232–1245)
VLDLC SERPL CALC-MCNC: 21 MG/DL (ref 5–40)
WBC # BLD AUTO: 12 X10E3/UL (ref 3.4–10.8)

## 2024-02-10 LAB
FERRITIN SERPL-MCNC: 370 NG/ML (ref 15–150)
IRON SATN MFR SERPL: 26 % (ref 15–55)
IRON SERPL-MCNC: 77 UG/DL (ref 27–139)
TIBC SERPL-MCNC: 293 UG/DL (ref 250–450)
UIBC SERPL-MCNC: 216 UG/DL (ref 118–369)
WRITTEN AUTHORIZATION: NORMAL

## 2024-02-12 ENCOUNTER — TELEMEDICINE (OUTPATIENT)
Dept: FAMILY MEDICINE CLINIC | Facility: CLINIC | Age: 79
End: 2024-02-12
Payer: MEDICARE

## 2024-02-12 DIAGNOSIS — E53.8 LOW FOLIC ACID: ICD-10-CM

## 2024-02-12 DIAGNOSIS — E53.8 B12 DEFICIENCY: ICD-10-CM

## 2024-02-12 DIAGNOSIS — E55.9 VITAMIN D DEFICIENCY: ICD-10-CM

## 2024-02-12 DIAGNOSIS — M79.7 FIBROMYALGIA: Primary | ICD-10-CM

## 2024-02-12 DIAGNOSIS — F41.9 ANXIETY: ICD-10-CM

## 2024-02-12 DIAGNOSIS — I48.0 PAF (PAROXYSMAL ATRIAL FIBRILLATION): ICD-10-CM

## 2024-02-12 DIAGNOSIS — F41.1 GENERALIZED ANXIETY DISORDER: ICD-10-CM

## 2024-02-12 DIAGNOSIS — R73.01 IFG (IMPAIRED FASTING GLUCOSE): ICD-10-CM

## 2024-02-12 DIAGNOSIS — D63.8 ANEMIA OF CHRONIC DISEASE: ICD-10-CM

## 2024-02-12 DIAGNOSIS — M79.7 FIBROMYALGIA: Chronic | ICD-10-CM

## 2024-02-12 DIAGNOSIS — N18.31 STAGE 3A CHRONIC KIDNEY DISEASE: ICD-10-CM

## 2024-02-12 PROCEDURE — 1160F RVW MEDS BY RX/DR IN RCRD: CPT | Performed by: PHYSICIAN ASSISTANT

## 2024-02-12 PROCEDURE — 99213 OFFICE O/P EST LOW 20 MIN: CPT | Performed by: PHYSICIAN ASSISTANT

## 2024-02-12 RX ORDER — TRAMADOL HYDROCHLORIDE 50 MG/1
50 TABLET ORAL EVERY 6 HOURS PRN
Qty: 120 TABLET | Refills: 2 | OUTPATIENT
Start: 2024-02-12

## 2024-02-12 RX ORDER — TRAMADOL HYDROCHLORIDE 50 MG/1
50 TABLET ORAL EVERY 6 HOURS PRN
Qty: 120 TABLET | Refills: 2 | Status: SHIPPED | OUTPATIENT
Start: 2024-02-12

## 2024-02-20 ENCOUNTER — OFFICE VISIT (OUTPATIENT)
Dept: CARDIOLOGY | Facility: CLINIC | Age: 79
End: 2024-02-20
Payer: MEDICARE

## 2024-02-20 VITALS
HEIGHT: 64 IN | DIASTOLIC BLOOD PRESSURE: 80 MMHG | SYSTOLIC BLOOD PRESSURE: 138 MMHG | BODY MASS INDEX: 30.22 KG/M2 | HEART RATE: 141 BPM | WEIGHT: 177 LBS

## 2024-02-20 DIAGNOSIS — I48.0 PAF (PAROXYSMAL ATRIAL FIBRILLATION): Primary | ICD-10-CM

## 2024-02-20 DIAGNOSIS — I10 BENIGN ESSENTIAL HTN: ICD-10-CM

## 2024-02-20 PROCEDURE — 93000 ELECTROCARDIOGRAM COMPLETE: CPT | Performed by: INTERNAL MEDICINE

## 2024-02-20 PROCEDURE — 3075F SYST BP GE 130 - 139MM HG: CPT | Performed by: INTERNAL MEDICINE

## 2024-02-20 PROCEDURE — 99214 OFFICE O/P EST MOD 30 MIN: CPT | Performed by: INTERNAL MEDICINE

## 2024-02-20 PROCEDURE — 3079F DIAST BP 80-89 MM HG: CPT | Performed by: INTERNAL MEDICINE

## 2024-02-20 RX ORDER — ROSUVASTATIN CALCIUM 20 MG/1
20 TABLET, COATED ORAL DAILY
Qty: 30 TABLET | Refills: 11 | Status: SHIPPED | OUTPATIENT
Start: 2024-02-20

## 2024-02-20 NOTE — PROGRESS NOTES
"      CARDIOLOGY    Rajesh Dover MD    ENCOUNTER DATE:  2024    Miguelina Mueller / 78 y.o. / female        CHIEF COMPLAINT / REASON FOR OFFICE VISIT     Dyspnea on exertion (2022 Follow up)  Atrial fibrillation  Hypertension    HISTORY OF PRESENT ILLNESS       HPI  Miguelina Mueller is a 78 y.o. female who presents today for reevaluation.  Patient is having more shortness of breath.  We did do an echocardiogram in November and since that time she says she still continues to get worse short of breath.  Unfortunately her daughter  just last year due to alcoholism.  She was only in her 40s.  She had appointment to see a pulmonologist but had to cancel because also her 's been ill.  From a cardiovascular standpoint she says nothing is really changed.      The following portions of the patient's history were reviewed and updated as appropriate: allergies, current medications, past family history, past medical history, past social history, past surgical history and problem list.      VITAL SIGNS     Visit Vitals  /80 (BP Location: Left arm)   Pulse (!) 141   Ht 163 cm (64.17\")   Wt 80.3 kg (177 lb)   LMP  (LMP Unknown)   BMI 30.22 kg/m²         Wt Readings from Last 3 Encounters:   24 80.3 kg (177 lb)   23 78.9 kg (173 lb 15.1 oz)   22 78.9 kg (174 lb)     Body mass index is 30.22 kg/m².      REVIEW OF SYSTEMS   ROS        PHYSICAL EXAMINATION     Constitutional:       Appearance: Healthy appearance.   Pulmonary:      Effort: Pulmonary effort is normal.   Cardiovascular:      PMI at left midclavicular line. Tachycardia present. Irregularly irregular rhythm. Normal S1. Normal S2.       Murmurs: There is no murmur.      No gallop.  No click. No rub.   Pulses:     Intact distal pulses.   Edema:     Peripheral edema absent.   Neurological:      Mental Status: Alert.           REVIEWED DATA       ECG 12 Lead    Date/Time: 2024 12:50 PM  Performed by: Rajesh Dover, " MD    Authorized by: Rajesh Dover MD  Comparison: compared with previous ECG from 11/1/2022  Comparison to previous ECG: Heart rate has increased.  Rhythm: atrial fibrillation    Clinical impression: abnormal EKG          Cardiac Procedures:      Lipid Panel          9/29/2023    13:44 2/8/2024    12:41   Lipid Panel   Total Cholesterol 205  152    Triglycerides 121  118    HDL Cholesterol 63  62    VLDL Cholesterol 21  21    LDL Cholesterol  121  69          ASSESSMENT & PLAN      Diagnosis Plan   1. PAF (paroxysmal atrial fibrillation)        2. Benign essential HTN              SUMMARY/DISCUSSION  Chronic A-fib.  Patient remains in A-fib her heart rates up.  We just did a workup back in November her overall heart rate averaged 100.  She also had an echocardiogram at that time that was also unremarkable.  I do agree with a pulmonary evaluation for shortness of breath I do not think it is cardiac at this point.  Her heart rates up this visit but has fluctuated in the past.  Again I did a monitor before and overall heart rate average below 100.  She is not symptomatic with her heart rate today I think it is just because of her visit.  Shortness of breath  No further chest discomforts.  At this point I will follow clinically and see what evolves.        MEDICATIONS         Discharge Medications            Accurate as of February 20, 2024 12:46 PM. If you have any questions, ask your nurse or doctor.                Continue These Medications        Instructions Start Date   B-12 1000 MCG sublingual tablet   PLACE ONE TABLET UNDER THE TONGUE EVERY DAY      Cholecalciferol 50 MCG (2000 UT) capsule  Commonly known as: D3 Super Strength   2,000 Units, Oral, Daily      Dilt- MG 24 hr capsule  Generic drug: dilTIAZem XR   TAKE ONE CAPSULE BY MOUTH EVERY DAY      DULoxetine 60 MG capsule  Commonly known as: CYMBALTA   60 mg, Oral, Daily, For stress and pain      folic acid 1 MG tablet  Commonly known as:  FOLVITE   TAKE ONE TABLET BY MOUTH DAILY      rivaroxaban 20 MG tablet  Commonly known as: Xarelto   20 mg, Oral, Daily      rosuvastatin 20 MG tablet  Commonly known as: Crestor   20 mg, Oral, Daily, For cholesterol and stop taking pravastatin      traMADol 50 MG tablet  Commonly known as: ULTRAM   50 mg, Oral, Every 6 Hours PRN, for pain                   **Dragon Disclaimer:   Much of this encounter note is an electronic transcription/translation of spoken language to printed text. The electronic translation of spoken language may permit erroneous, or at times, nonsensical words or phrases to be inadvertently transcribed. Although I have reviewed the note for such errors, some may still exist.

## 2024-04-09 DIAGNOSIS — M79.7 FIBROMYALGIA: Chronic | ICD-10-CM

## 2024-04-09 RX ORDER — TRAMADOL HYDROCHLORIDE 50 MG/1
50 TABLET ORAL EVERY 6 HOURS PRN
Qty: 120 TABLET | Refills: 2 | OUTPATIENT
Start: 2024-04-09

## 2024-04-11 RX ORDER — MAGNESIUM 200 MG
TABLET ORAL
Qty: 90 TABLET | Refills: 3 | Status: SHIPPED | OUTPATIENT
Start: 2024-04-11

## 2024-04-15 ENCOUNTER — TELEPHONE (OUTPATIENT)
Dept: FAMILY MEDICINE CLINIC | Facility: CLINIC | Age: 79
End: 2024-04-15
Payer: MEDICARE

## 2024-04-15 NOTE — TELEPHONE ENCOUNTER
Hub staff attempted to follow warm transfer process and was unsuccessful     Caller: Miguelina Mueller    Relationship to patient: Self    Best call back number: [phone]     Patient is needing: PATIENT CALLED AND NEEDS TO SCHEDULE AN MY CHART VIDEO APPT BUT THERE IS NONE AVAILABLE FOR LAUREL GOMEZ.  PLEASE CALL PATIENT TO SCHEDULE A MY CHART VIDEO DUE TO SHE IS INVALENT NOT ABLE TO COME INTO THE OFFICE.

## 2024-04-16 DIAGNOSIS — M79.7 FIBROMYALGIA: Chronic | ICD-10-CM

## 2024-04-16 RX ORDER — TRAMADOL HYDROCHLORIDE 50 MG/1
50 TABLET ORAL EVERY 6 HOURS PRN
Qty: 120 TABLET | Refills: 0 | Status: SHIPPED | OUTPATIENT
Start: 2024-04-16

## 2024-04-17 NOTE — TELEPHONE ENCOUNTER
Patient was notified that 30 day supply of her requested medication was sent to her pharmacy. Patient verbalized understanding and had no further questions

## 2024-05-03 ENCOUNTER — TELEMEDICINE (OUTPATIENT)
Dept: FAMILY MEDICINE CLINIC | Facility: CLINIC | Age: 79
End: 2024-05-03
Payer: MEDICARE

## 2024-05-03 VITALS — HEIGHT: 64 IN | BODY MASS INDEX: 30.22 KG/M2

## 2024-05-03 DIAGNOSIS — E53.8 B12 DEFICIENCY: ICD-10-CM

## 2024-05-03 DIAGNOSIS — F33.41 RECURRENT MAJOR DEPRESSIVE DISORDER, IN PARTIAL REMISSION: ICD-10-CM

## 2024-05-03 DIAGNOSIS — M79.7 FIBROMYALGIA: Chronic | ICD-10-CM

## 2024-05-03 DIAGNOSIS — R73.01 IFG (IMPAIRED FASTING GLUCOSE): ICD-10-CM

## 2024-05-03 DIAGNOSIS — F41.9 ANXIETY: Primary | ICD-10-CM

## 2024-05-03 DIAGNOSIS — E78.2 MIXED HYPERLIPIDEMIA: ICD-10-CM

## 2024-05-03 DIAGNOSIS — E55.9 VITAMIN D DEFICIENCY: ICD-10-CM

## 2024-05-03 DIAGNOSIS — I10 BENIGN ESSENTIAL HTN: ICD-10-CM

## 2024-05-03 DIAGNOSIS — I48.0 PAF (PAROXYSMAL ATRIAL FIBRILLATION): ICD-10-CM

## 2024-05-03 DIAGNOSIS — E53.8 LOW FOLIC ACID: ICD-10-CM

## 2024-05-03 PROCEDURE — 1159F MED LIST DOCD IN RCRD: CPT | Performed by: PHYSICIAN ASSISTANT

## 2024-05-03 PROCEDURE — 99213 OFFICE O/P EST LOW 20 MIN: CPT | Performed by: PHYSICIAN ASSISTANT

## 2024-05-03 PROCEDURE — 1160F RVW MEDS BY RX/DR IN RCRD: CPT | Performed by: PHYSICIAN ASSISTANT

## 2024-05-03 RX ORDER — GABAPENTIN 100 MG/1
100 CAPSULE ORAL 3 TIMES DAILY
Qty: 90 CAPSULE | Refills: 2 | Status: SHIPPED | OUTPATIENT
Start: 2024-05-03

## 2024-05-03 RX ORDER — TRAMADOL HYDROCHLORIDE 50 MG/1
50 TABLET ORAL EVERY 6 HOURS PRN
Qty: 360 TABLET | Refills: 0 | Status: SHIPPED | OUTPATIENT
Start: 2024-05-03

## 2024-05-03 NOTE — PROGRESS NOTES
Subjective   Miguelina Mueller is a 79 y.o. female.   You have chosen to receive care through a telehealth visit.  Do you consent to use a video/audio connection for your medical care today? Yes  Patient is in her living room and I am at my desk  History of Present Illness    Since the last visit, she has overall felt tired.  She has Primary Hypertension and well controlled on current medication, Impaired fasting glucose and will monitor labs to watch for DMII, Hyperlipidemia with goals met with current Rx, Atrial Fibillation and remains under the care of their cardiologist for management, and Vitamin D deficiency and labs are at goal >30 ng/mL.  she has been compliant with current medications have reviewed them.  The patient denies medication side effects.  Will refill medications. LMP  (LMP Unknown) .  BMI is >= 30 and <35. (Class 1 Obesity). The following options were offered after discussion;: nutrition counseling/recommendations      Results for orders placed or performed in visit on 02/07/24   Comprehensive metabolic panel    Specimen: Blood   Result Value Ref Range    Glucose 94 70 - 99 mg/dL    BUN 11 8 - 27 mg/dL    Creatinine 1.15 (H) 0.57 - 1.00 mg/dL    EGFR Result 49 (L) >59 mL/min/1.73    BUN/Creatinine Ratio 10 (L) 12 - 28    Sodium 141 134 - 144 mmol/L    Potassium 4.1 3.5 - 5.2 mmol/L    Chloride 102 96 - 106 mmol/L    Total CO2 21 20 - 29 mmol/L    Calcium 9.6 8.7 - 10.3 mg/dL    Total Protein 6.5 6.0 - 8.5 g/dL    Albumin 4.2 3.8 - 4.8 g/dL    Globulin 2.3 1.5 - 4.5 g/dL    A/G Ratio 1.8 1.2 - 2.2    Total Bilirubin 1.0 0.0 - 1.2 mg/dL    Alkaline Phosphatase 123 (H) 44 - 121 IU/L    AST (SGOT) 22 0 - 40 IU/L    ALT (SGPT) 14 0 - 32 IU/L   Lipid panel    Specimen: Blood   Result Value Ref Range    Total Cholesterol 152 100 - 199 mg/dL    Triglycerides 118 0 - 149 mg/dL    HDL Cholesterol 62 >39 mg/dL    VLDL Cholesterol Aidan 21 5 - 40 mg/dL    LDL Chol Calc (NIH) 69 0 - 99 mg/dL   TSH    Specimen:  Blood   Result Value Ref Range    TSH 4.350 0.450 - 4.500 uIU/mL   Hemoglobin A1c    Specimen: Blood   Result Value Ref Range    Hemoglobin A1C 5.7 (H) 4.8 - 5.6 %   Vitamin D,25-Hydroxy    Specimen: Blood   Result Value Ref Range    25 Hydroxy, Vitamin D 41.9 30.0 - 100.0 ng/mL   Vitamin B12    Specimen: Blood   Result Value Ref Range    Vitamin B-12 1,389 (H) 232 - 1,245 pg/mL   Folate    Specimen: Blood   Result Value Ref Range    Folate >20.0 >3.0 ng/mL   Iron Profile   Result Value Ref Range    TIBC 293 250 - 450 ug/dL    UIBC 216 118 - 369 ug/dL    Iron 77 27 - 139 ug/dL    Iron Saturation 26 15 - 55 %   Ferritin   Result Value Ref Range    Ferritin 370 (H) 15 - 150 ng/mL   Written Authorization   Result Value Ref Range    Written Authorization Comment    CBC and Differential    Specimen: Blood   Result Value Ref Range    WBC 12.0 (H) 3.4 - 10.8 x10E3/uL    RBC 5.61 (H) 3.77 - 5.28 x10E6/uL    Hemoglobin 17.2 (H) 11.1 - 15.9 g/dL    Hematocrit 51.3 (H) 34.0 - 46.6 %    MCV 91 79 - 97 fL    MCH 30.7 26.6 - 33.0 pg    MCHC 33.5 31.5 - 35.7 g/dL    RDW 13.0 11.7 - 15.4 %    Platelets 219 150 - 450 x10E3/uL    Neutrophil Rel % 58 Not Estab. %    Lymphocyte Rel % 31 Not Estab. %    Monocyte Rel % 8 Not Estab. %    Eosinophil Rel % 2 Not Estab. %    Basophil Rel % 1 Not Estab. %    Neutrophils Absolute 7.0 1.4 - 7.0 x10E3/uL    Lymphocytes Absolute 3.7 (H) 0.7 - 3.1 x10E3/uL    Monocytes Absolute 1.0 (H) 0.1 - 0.9 x10E3/uL    Eosinophils Absolute 0.3 0.0 - 0.4 x10E3/uL    Basophils Absolute 0.1 0.0 - 0.2 x10E3/uL    Immature Granulocyte Rel % 0 Not Estab. %    Immature Grans Absolute 0.0 0.0 - 0.1 x10E3/uL   Patient had labs 2/8/2024--- we went over labs last visit 2/12/2024  2/10/2024  9:04 AM EST       White blood count remains high and hemoglobin high----advise referral again to see hematologist---she declined last labs.  A1C still pre diabetes. Thyroid ok. Iron level normal. Lipids at goals   LDL now at  goal.  Goals met with vitamin D folic acid and B12 can reduce folic acid and B12 to 3 times a week  Stable CKD 3a.  She continues on tramadol in Cymbalta for her fibromyalgia pain and does help.  The patient has read and signed the [facility] Controlled Substance Contract.  I will continue to see patient for regular follow up appointments.  They are well controlled on their medication.  NATA has been reviewed by me and is updated every 3 months. The patient is aware of the potential for addiction and dependence.  She was seen by Dr. Dover cardiologist on 2024 for evaluation of paroxysmal atrial fibrillation.  I must note his concern in his progress note about patient's shortness of breath and elevated heart rate.... Strongly urged pulmonary evaluation again at this appointment.  No chest pain.  Refilled medications with follow-up ordered for February.  Also takes Cymbalta for anxiety and depression and it does help some.  She has had grief in the last year after the death of her daughter who  at age 40 of alcoholism.  He has not been on gabapentin in the past for her fibromyalgia and will start low-dose and add this to the tramadol due to her continued pain  The following portions of the patient's history were reviewed and updated as appropriate: allergies, current medications, past family history, past medical history, past social history, past surgical history, and problem list.    Review of Systems   Constitutional:  Positive for fatigue. Negative for diaphoresis.   HENT:  Negative for nosebleeds and trouble swallowing.    Eyes:  Negative for blurred vision and visual disturbance.   Respiratory:  Negative for choking.    Gastrointestinal:  Negative for blood in stool.   Musculoskeletal:  Positive for arthralgias, back pain, gait problem, joint swelling, myalgias, neck pain and neck stiffness.   Allergic/Immunologic: Negative for immunocompromised state.   Neurological:  Negative for facial  asymmetry and speech difficulty.   Psychiatric/Behavioral:  Positive for dysphoric mood and depressed mood. Negative for self-injury and suicidal ideas.        Objective   Physical Exam  Constitutional:       General: She is not in acute distress.     Appearance: Normal appearance. She is well-developed. She is not diaphoretic.   HENT:      Head: Normocephalic and atraumatic.      Right Ear: External ear normal.      Left Ear: External ear normal.   Eyes:      General: No scleral icterus.     Conjunctiva/sclera: Conjunctivae normal.   Pulmonary:      Effort: Pulmonary effort is normal. No respiratory distress.      Breath sounds: No stridor.   Musculoskeletal:         General: No deformity. Normal range of motion.      Cervical back: Normal range of motion.   Skin:     General: Skin is dry.      Coloration: Skin is not jaundiced.   Neurological:      General: No focal deficit present.      Mental Status: She is alert and oriented to person, place, and time.      Coordination: Coordination normal.   Psychiatric:         Behavior: Behavior normal.         Thought Content: Thought content normal.         Judgment: Judgment normal.           Assessment & Plan   Diagnoses and all orders for this visit:    1. Fibromyalgia (Primary)  -     ToxAssure Flex 23, Ur -    2. Anemia of chronic disease    On B12 3 times a week.   She declines referral for   She desires to continue Cymbalta for anxiety and grief depression and it does help although she does have breakthrough symptoms and declines referral to psychiatry  Continue tramadol for fibromyalgia pain does help.  She will need to update her controlled substance contract and have drug screen.  Needs in person visit next appt  She knows to start the gabapentin at 100 mg once at bedtime and then may increase each day to twice daily then 3 times daily and dose can be adjusted after she tapers up.

## 2024-05-03 NOTE — LETTER
Controlled Substance Prescribing Agreement          I, Miguelina Mueller [PATIENT],  1945 [] a patient of  Yuki Pennington PA-C   [PROVIDER] at [facility] [PRACTICE], have been informed that  individuals who are prescribed certain Controlled Substances including, but not limited to, narcotic pain medicines, stimulants, benzodiazepine tranquilizers, and barbiturate sedatives, can abuse those substances or may allow abuse by others, and have some risk of developing an addictive disorder or suffering a relapse of a prior addiction. Therefore, I have been informed that it is necessary to observe strict rules pertaining to their use, and I agree to follow the terms and procedures described in this Agreement as consideration for, and as a condition of, the willingness of the physician whose signature appears below to consider prescribing or to continue prescribing Controlled Substances to treat my pain. ---Ultram    1. I will inform my physician of any current or past substance abuse, or any current or past substance abuse of any immediate member of my immediate family.     2. I agree that I may be subject to a voluntary evaluation by psychologists and/or psychiatrists, possibly at my own expense, before any Controlled Substances will be prescribed to me. I agree that the need to be evaluated by psychologists and/or psychiatrists may be revisited every three (3) to six (6) months thereafter while taking the medication.     3. All Controlled Substances must come from a provider in the PROVIDER’S PRACTICE. My Controlled Substances will come from the PROVIDER whose signature appears below, or during his or her absence, by the covering provider, unless specific written authorization is obtained from the office for an exception.     4. I will obtain all Controlled Substances from the same pharmacy. Should the need arise to change pharmacies, I will inform the PROVIDER’S office.     5. I will  inform the PROVIDER’S office of any new medications or medical conditions, and of any adverse effects I experience from any of the medications that I take.     6. I will inform my other health care providers that I am taking the Controlled Substances listed above, and of the existence of this Agreement. In the event of an emergency, I will provide the foregoing information to emergency department providers.     7. I agree that my prescribing PROVIDER has permission to discuss all diagnostic and treatment details with other health care providers, pharmacists, or other professionals who provide my health care regarding my use of Controlled Substances for purposes of maintaining accountability.     8. I will not allow anyone else to have, use sell, or otherwise have access to these medications. The sharing of medications with anyone is absolutely forbidden and is against the law.         9. I understand that Controlled Substances may be hazardous or lethal to a person who is not tolerant to their effects, especially a child, and that I must keep them out of reach of such people for their own safety.     10. I understand that tampering with a written prescription is a felony and I will not change or tamper with the PROVIDER’S written prescription.     11. I am aware that attempting to obtain a Controlled Substance under false pretenses is illegal.     12. I agree not to alter my medication in any way, and I will take my medication whole, and it will not be broken, chewed, crushed, injected, or snorted.     13. I will take my medication as instructed and prescribed, and I will not exceed the maximum prescribed dose. Any change in dosage must be approved by the PROVIDER or a physician within the PRACTICE.     14. I understand that these drugs should not be stopped abruptly, as withdrawal syndromes may develop.     15. I will cooperate with unannounced urine or serum toxicology screenings as may be requested, as well as  any random pill counts of medication by the PROVIDER. Failure to comply may result in termination of the PROVIDER-patient relationship.     16. I understand that the presence of unauthorized and/or illegal substances in the screenings described in the paragraph above may prompt referral for assessment for a substance abuse disorder or termination of the PROVIDER-patient relationship.     17. I understand that medications may not be replaced if they are lost, damaged, or stolen. If any of these situations arise that cause me to request an early refill of my medication, a copy of a filed police report or a statement from me explaining the circumstances may be required before additional prescriptions are considered. If I request an early refill secondary to lost, damaged, or stolen prescriptions twice within a year, I may be discharged from the practice.     18. I understand that a prescription may be given early if the PROVIDER or the patient will be out of town when the refill is due. These prescriptions will contain instructions to the pharmacist that the prescriptions(s) may not be filled prior to the appropriate date.     19. If the responsible legal authorities have questions concerning my treatment, as may occur, for example, if I obtained medication at several pharmacies, all confidentiality is waived, and these authorities may be given full access to my full records of Controlled Substances administration.     20. I will keep my scheduled appointments in order to receive medication renewals. If I need to cancel my appointment, I will do so a minimum of twenty-four (24) hours before it is scheduled.     21. I understand that I may be asked to bring my medications in their original container to the PROVIDER’s office while I am on controlled medication.     22. Refills generally will not be given over the phone, after office hours, during the weekends, and on holidays.     23. I understand that any medical  treatment is initially a trial, with the goal of treatment being to improve the quality of life and ability to function and/or work. These parameters will be assessed periodically to determine the benefits of continued therapy, and continued prescription is contingent on whether my physician believes that the medication usage benefits me. I will comply with all treatments as outlined by the PROVIDER.     24. I have been explained the risks and potential benefits of these therapies, including, but not limited to, psychological addiction, physical dependence, withdrawal and over dosage.     25. I understand that failure to adhere to these policies and/or failure to comply with the PROVIDER’S treatment plan may result in cessation of therapy with Controlled Substance prescribing by the PROVIDER or referral for further specialty assessment, as well as possible discharge from the PRACTICE.     26. I, the undersigned patient, attest that the foregoing was discussed with me, and that I have read, fully understand, and agree to all of the above requirements and instructions. I affirm that I have the full right and power to sign and be bound by this  Agreement.         Date:  __________________________________________    Time:  __________________________________________    Patient Printed Name:  _____________________________    Patient Signature:  ________________________________           Date:  __________________________________________    Time:  __________________________________________    Provider Signature:  _______________________________

## 2024-05-03 NOTE — LETTER
Controlled Substance Prescribing Agreement          I, Miguelina Mueller [PATIENT],  1945 [] a patient of  Yuki Pennington PA-C   [PROVIDER] at [facility] [PRACTICE], have been informed that  individuals who are prescribed certain Controlled Substances including, but not limited to, narcotic pain medicines, stimulants, benzodiazepine tranquilizers, and barbiturate sedatives, can abuse those substances or may allow abuse by others, and have some risk of developing an addictive disorder or suffering a relapse of a prior addiction. Therefore, I have been informed that it is necessary to observe strict rules pertaining to their use, and I agree to follow the terms and procedures described in this Agreement as consideration for, and as a condition of, the willingness of the physician whose signature appears below to consider prescribing or to continue prescribing Controlled Substances to treat my pain. --Ultram and start Gabapentin    1. I will inform my physician of any current or past substance abuse, or any current or past substance abuse of any immediate member of my immediate family.     2. I agree that I may be subject to a voluntary evaluation by psychologists and/or psychiatrists, possibly at my own expense, before any Controlled Substances will be prescribed to me. I agree that the need to be evaluated by psychologists and/or psychiatrists may be revisited every three (3) to six (6) months thereafter while taking the medication.     3. All Controlled Substances must come from a provider in the PROVIDER’S PRACTICE. My Controlled Substances will come from the PROVIDER whose signature appears below, or during his or her absence, by the covering provider, unless specific written authorization is obtained from the office for an exception.     4. I will obtain all Controlled Substances from the same pharmacy. Should the need arise to change pharmacies, I will inform the PROVIDER’S  office.     5. I will inform the PROVIDER’S office of any new medications or medical conditions, and of any adverse effects I experience from any of the medications that I take.     6. I will inform my other health care providers that I am taking the Controlled Substances listed above, and of the existence of this Agreement. In the event of an emergency, I will provide the foregoing information to emergency department providers.     7. I agree that my prescribing PROVIDER has permission to discuss all diagnostic and treatment details with other health care providers, pharmacists, or other professionals who provide my health care regarding my use of Controlled Substances for purposes of maintaining accountability.     8. I will not allow anyone else to have, use sell, or otherwise have access to these medications. The sharing of medications with anyone is absolutely forbidden and is against the law.         9. I understand that Controlled Substances may be hazardous or lethal to a person who is not tolerant to their effects, especially a child, and that I must keep them out of reach of such people for their own safety.     10. I understand that tampering with a written prescription is a felony and I will not change or tamper with the PROVIDER’S written prescription.     11. I am aware that attempting to obtain a Controlled Substance under false pretenses is illegal.     12. I agree not to alter my medication in any way, and I will take my medication whole, and it will not be broken, chewed, crushed, injected, or snorted.     13. I will take my medication as instructed and prescribed, and I will not exceed the maximum prescribed dose. Any change in dosage must be approved by the PROVIDER or a physician within the PRACTICE.     14. I understand that these drugs should not be stopped abruptly, as withdrawal syndromes may develop.     15. I will cooperate with unannounced urine or serum toxicology screenings as may be  requested, as well as any random pill counts of medication by the PROVIDER. Failure to comply may result in termination of the PROVIDER-patient relationship.     16. I understand that the presence of unauthorized and/or illegal substances in the screenings described in the paragraph above may prompt referral for assessment for a substance abuse disorder or termination of the PROVIDER-patient relationship.     17. I understand that medications may not be replaced if they are lost, damaged, or stolen. If any of these situations arise that cause me to request an early refill of my medication, a copy of a filed police report or a statement from me explaining the circumstances may be required before additional prescriptions are considered. If I request an early refill secondary to lost, damaged, or stolen prescriptions twice within a year, I may be discharged from the practice.     18. I understand that a prescription may be given early if the PROVIDER or the patient will be out of town when the refill is due. These prescriptions will contain instructions to the pharmacist that the prescriptions(s) may not be filled prior to the appropriate date.     19. If the responsible legal authorities have questions concerning my treatment, as may occur, for example, if I obtained medication at several pharmacies, all confidentiality is waived, and these authorities may be given full access to my full records of Controlled Substances administration.     20. I will keep my scheduled appointments in order to receive medication renewals. If I need to cancel my appointment, I will do so a minimum of twenty-four (24) hours before it is scheduled.     21. I understand that I may be asked to bring my medications in their original container to the PROVIDER’s office while I am on controlled medication.     22. Refills generally will not be given over the phone, after office hours, during the weekends, and on holidays.     23. I understand  that any medical treatment is initially a trial, with the goal of treatment being to improve the quality of life and ability to function and/or work. These parameters will be assessed periodically to determine the benefits of continued therapy, and continued prescription is contingent on whether my physician believes that the medication usage benefits me. I will comply with all treatments as outlined by the PROVIDER.     24. I have been explained the risks and potential benefits of these therapies, including, but not limited to, psychological addiction, physical dependence, withdrawal and over dosage.     25. I understand that failure to adhere to these policies and/or failure to comply with the PROVIDER’S treatment plan may result in cessation of therapy with Controlled Substance prescribing by the PROVIDER or referral for further specialty assessment, as well as possible discharge from the PRACTICE.     26. I, the undersigned patient, attest that the foregoing was discussed with me, and that I have read, fully understand, and agree to all of the above requirements and instructions. I affirm that I have the full right and power to sign and be bound by this  Agreement.         Date:  __________________________________________    Time:  __________________________________________    Patient Printed Name:  _____________________________    Patient Signature:  ________________________________           Date:  __________________________________________    Time:  __________________________________________    Provider Signature:  _______________________________

## 2024-06-05 RX ORDER — FOLIC ACID 1 MG/1
TABLET ORAL
Qty: 90 TABLET | Refills: 3 | Status: SHIPPED | OUTPATIENT
Start: 2024-06-05

## 2024-06-10 ENCOUNTER — TELEPHONE (OUTPATIENT)
Dept: FAMILY MEDICINE CLINIC | Facility: CLINIC | Age: 79
End: 2024-06-10
Payer: MEDICARE

## 2024-06-10 DIAGNOSIS — F41.9 ANXIETY: ICD-10-CM

## 2024-06-10 NOTE — TELEPHONE ENCOUNTER
That is fantastic and there is refills on the prescription if she needs to go up on dose she can let me know

## 2024-06-10 NOTE — TELEPHONE ENCOUNTER
Caller: Miguelina Mueller    Relationship: Self    Best call back number:     What is the best time to reach you:     Who are you requesting to speak with (clinical staff, provider,  specific staff member): DR KUMARI AND STAFF    Do you know the name of the person who called:     What was the call regarding: PATIENT IS CALLING IN TO INFORM DR KUMARI THAT THE GABAPENTIN THAT WAS PRESCRIBED TO HER ABOUT A MONTH AGO IS HELPING HER A LOT.  SHE WAS TO CALL AFTER TAKING THIS FOR TWO WEEKS BUT SHE FORGOT.     Is it okay if the provider responds through MyChart:

## 2024-08-21 ENCOUNTER — TELEPHONE (OUTPATIENT)
Dept: FAMILY MEDICINE CLINIC | Facility: CLINIC | Age: 79
End: 2024-08-21
Payer: MEDICARE

## 2024-08-21 DIAGNOSIS — M79.7 FIBROMYALGIA: Chronic | ICD-10-CM

## 2024-08-21 RX ORDER — TRAMADOL HYDROCHLORIDE 50 MG/1
50 TABLET ORAL EVERY 6 HOURS PRN
Qty: 360 TABLET | Refills: 0 | OUTPATIENT
Start: 2024-08-21

## 2024-08-21 NOTE — TELEPHONE ENCOUNTER
Caller: Miguelina Mueller    Relationship: Self    Best call back number:     [phone]        What orders are you requesting (i.e. lab or imaging): BLOOD WORK    In what timeframe would the patient need to come in: PRIOR TO AWV    Where will you receive your lab/imaging services: IN OFFICE    Additional notes: PLEASE ADVISE

## 2024-08-26 DIAGNOSIS — N18.31 STAGE 3A CHRONIC KIDNEY DISEASE: ICD-10-CM

## 2024-08-26 DIAGNOSIS — R73.01 IFG (IMPAIRED FASTING GLUCOSE): Primary | ICD-10-CM

## 2024-08-26 DIAGNOSIS — I10 BENIGN ESSENTIAL HTN: ICD-10-CM

## 2024-08-26 DIAGNOSIS — I48.0 PAF (PAROXYSMAL ATRIAL FIBRILLATION): ICD-10-CM

## 2024-08-27 LAB
ALBUMIN SERPL-MCNC: 4.1 G/DL (ref 3.8–4.8)
ALP SERPL-CCNC: 104 IU/L (ref 44–121)
ALT SERPL-CCNC: 13 IU/L (ref 0–32)
AST SERPL-CCNC: 18 IU/L (ref 0–40)
BASOPHILS # BLD AUTO: 0.1 X10E3/UL (ref 0–0.2)
BASOPHILS NFR BLD AUTO: 1 %
BILIRUB SERPL-MCNC: 0.7 MG/DL (ref 0–1.2)
BUN SERPL-MCNC: 9 MG/DL (ref 8–27)
BUN/CREAT SERPL: 8 (ref 12–28)
CALCIUM SERPL-MCNC: 9.4 MG/DL (ref 8.7–10.3)
CHLORIDE SERPL-SCNC: 102 MMOL/L (ref 96–106)
CO2 SERPL-SCNC: 21 MMOL/L (ref 20–29)
CREAT SERPL-MCNC: 1.09 MG/DL (ref 0.57–1)
CREAT UR-MCNC: NORMAL MG/DL
EGFRCR SERPLBLD CKD-EPI 2021: 52 ML/MIN/1.73
EOSINOPHIL # BLD AUTO: 0.4 X10E3/UL (ref 0–0.4)
EOSINOPHIL NFR BLD AUTO: 3 %
ERYTHROCYTE [DISTWIDTH] IN BLOOD BY AUTOMATED COUNT: 12.7 % (ref 11.7–15.4)
GLOBULIN SER CALC-MCNC: 2.1 G/DL (ref 1.5–4.5)
GLUCOSE SERPL-MCNC: 90 MG/DL (ref 70–99)
GLUCOSE UR QL STRIP: NORMAL
HBA1C MFR BLD: 5.6 % (ref 4.8–5.6)
HCT VFR BLD AUTO: 49 % (ref 34–46.6)
HGB BLD-MCNC: 16.2 G/DL (ref 11.1–15.9)
IMM GRANULOCYTES # BLD AUTO: 0 X10E3/UL (ref 0–0.1)
IMM GRANULOCYTES NFR BLD AUTO: 0 %
KETONES UR QL STRIP: NORMAL
LYMPHOCYTES # BLD AUTO: 3.4 X10E3/UL (ref 0.7–3.1)
LYMPHOCYTES NFR BLD AUTO: 25 %
MCH RBC QN AUTO: 31.5 PG (ref 26.6–33)
MCHC RBC AUTO-ENTMCNC: 33.1 G/DL (ref 31.5–35.7)
MCV RBC AUTO: 95 FL (ref 79–97)
MICROALBUMIN UR-MCNC: NORMAL
MONOCYTES # BLD AUTO: 0.9 X10E3/UL (ref 0.1–0.9)
MONOCYTES NFR BLD AUTO: 6 %
NEUTROPHILS # BLD AUTO: 8.9 X10E3/UL (ref 1.4–7)
NEUTROPHILS NFR BLD AUTO: 65 %
PH UR STRIP: NORMAL [PH]
PLATELET # BLD AUTO: 218 X10E3/UL (ref 150–450)
POTASSIUM SERPL-SCNC: 4.4 MMOL/L (ref 3.5–5.2)
PROT SERPL-MCNC: 6.2 G/DL (ref 6–8.5)
PROT UR QL STRIP: NORMAL
RBC # BLD AUTO: 5.14 X10E6/UL (ref 3.77–5.28)
SODIUM SERPL-SCNC: 140 MMOL/L (ref 134–144)
SP GR UR STRIP: NORMAL
WBC # BLD AUTO: 13.7 X10E3/UL (ref 3.4–10.8)

## 2024-08-29 ENCOUNTER — OFFICE VISIT (OUTPATIENT)
Dept: FAMILY MEDICINE CLINIC | Facility: CLINIC | Age: 79
End: 2024-08-29
Payer: MEDICARE

## 2024-08-29 VITALS
HEART RATE: 90 BPM | BODY MASS INDEX: 29.37 KG/M2 | HEIGHT: 64 IN | RESPIRATION RATE: 16 BRPM | OXYGEN SATURATION: 99 % | DIASTOLIC BLOOD PRESSURE: 76 MMHG | TEMPERATURE: 97.3 F | SYSTOLIC BLOOD PRESSURE: 132 MMHG | WEIGHT: 172 LBS

## 2024-08-29 DIAGNOSIS — E53.8 LOW FOLIC ACID: Chronic | ICD-10-CM

## 2024-08-29 DIAGNOSIS — R73.01 IFG (IMPAIRED FASTING GLUCOSE): Chronic | ICD-10-CM

## 2024-08-29 DIAGNOSIS — M79.7 FIBROMYALGIA: Chronic | ICD-10-CM

## 2024-08-29 DIAGNOSIS — F33.41 RECURRENT MAJOR DEPRESSIVE DISORDER, IN PARTIAL REMISSION: ICD-10-CM

## 2024-08-29 DIAGNOSIS — Z00.00 MEDICARE ANNUAL WELLNESS VISIT, SUBSEQUENT: ICD-10-CM

## 2024-08-29 DIAGNOSIS — E55.9 VITAMIN D DEFICIENCY: Chronic | ICD-10-CM

## 2024-08-29 DIAGNOSIS — E53.8 B12 DEFICIENCY: Chronic | ICD-10-CM

## 2024-08-29 DIAGNOSIS — E78.2 MIXED HYPERLIPIDEMIA: Primary | Chronic | ICD-10-CM

## 2024-08-29 DIAGNOSIS — I48.0 PAF (PAROXYSMAL ATRIAL FIBRILLATION): Chronic | ICD-10-CM

## 2024-08-29 DIAGNOSIS — F41.9 ANXIETY: Chronic | ICD-10-CM

## 2024-08-29 RX ORDER — GABAPENTIN 300 MG/1
300 CAPSULE ORAL 3 TIMES DAILY
Qty: 90 CAPSULE | Refills: 5 | Status: SHIPPED | OUTPATIENT
Start: 2024-08-29

## 2024-08-29 RX ORDER — TRAMADOL HYDROCHLORIDE 50 MG/1
50 TABLET ORAL EVERY 6 HOURS PRN
Qty: 360 TABLET | Refills: 0 | Status: SHIPPED | OUTPATIENT
Start: 2024-08-29

## 2024-08-29 NOTE — PROGRESS NOTES
Subjective   The ABCs of the Annual Wellness Visit  Medicare Wellness Visit      Miguelina Mueller is a 79 y.o. patient who presents for a Medicare Wellness Visit.    The following portions of the patient's history were reviewed and   updated as appropriate: allergies, current medications, past family history, past medical history, past social history, past surgical history, and problem list.    Compared to one year ago, the patient's physical   health is worse.  Compared to one year ago, the patient's mental   health is worse.    Recent Hospitalizations:  She was not admitted to the hospital during the last year.     Current Medical Providers:  Patient Care Team:  Yuki Pennington PA-C as PCP - General  Yuki Pennington PA-C as PCP - Family Medicine  Rajesh Dover MD as Consulting Physician (Cardiology)    Outpatient Medications Prior to Visit   Medication Sig Dispense Refill    Cholecalciferol (D3 Super Strength) 50 MCG (2000 UT) capsule Take 1 capsule by mouth Daily. 90 capsule 3    Cyanocobalamin (B-12) 1000 MCG sublingual tablet DISSOLVE ONE TABLET UNDER THE TONGUE DAILY 90 tablet 3    DULoxetine (CYMBALTA) 60 MG capsule Take 1 capsule by mouth Daily. For stress and pain 30 capsule 11    folic acid (FOLVITE) 1 MG tablet TAKE ONE TABLET BY MOUTH DAILY 90 tablet 3    gabapentin (NEURONTIN) 100 MG capsule Take 1 capsule by mouth 3 (Three) Times a Day. For Fibromyalgia 90 capsule 2    rivaroxaban (Xarelto) 20 MG tablet Take 1 tablet by mouth Daily. 30 tablet 11    rosuvastatin (Crestor) 20 MG tablet Take 1 tablet by mouth Daily. For cholesterol and stop taking pravastatin 30 tablet 11    traMADol (ULTRAM) 50 MG tablet Take 1 tablet by mouth Every 6 (Six) Hours As Needed for Moderate Pain. for pain 360 tablet 0    Dilt- MG 24 hr capsule TAKE ONE CAPSULE BY MOUTH EVERY DAY (Patient not taking: Reported on 8/29/2024) 30 capsule 11     No facility-administered medications prior to visit.     Opioid medication/s  "are on active medication list.  and I have evaluated her active treatment plan and pain score trends (see table).  Vitals:    08/29/24 1555   PainSc: 0-No pain     I have reviewed the chart for potential of high risk medication and harmful drug interactions in the elderly.        Aspirin is not on active medication list.  Aspirin use is not indicated based on review of current medical condition/s. Risk of harm outweighs potential benefits.  .    Patient Active Problem List   Diagnosis    Anxiety    Mild episode of recurrent major depressive disorder    Fibromyalgia    Hyperlipidemia    Benign essential HTN    IFG (impaired fasting glucose)    Renal insufficiency    Shoulder bursitis    Seasonal allergic rhinitis    PAF (paroxysmal atrial fibrillation)    Vitamin D deficiency    B12 deficiency    Low folic acid    Anemia of chronic disease    Recurrent major depressive disorder, in partial remission     Advance Care Planning Advance Directive is not on file.  ACP discussion was held with the patient during this visit. Patient does not have an advance directive, information provided.            Objective   Vitals:    08/29/24 1555   BP: 132/76   Pulse: 90   Resp: 16   Temp: 97.3 °F (36.3 °C)   SpO2: 99%   Weight: 78 kg (172 lb)   Height: 163 cm (64.17\")   PainSc: 0-No pain       Estimated body mass index is 29.37 kg/m² as calculated from the following:    Height as of this encounter: 163 cm (64.17\").    Weight as of this encounter: 78 kg (172 lb).            Does the patient have evidence of cognitive impairment? No  Lab Results   Component Value Date    HGBA1C 5.6 08/26/2024                                                                                               Health  Risk Assessment    Smoking Status:  Social History     Tobacco Use   Smoking Status Former    Current packs/day: 0.25    Types: Cigarettes    Passive exposure: Never   Smokeless Tobacco Never   Tobacco Comments    quit around 2007     Alcohol " Consumption:  Social History     Substance and Sexual Activity   Alcohol Use Yes    Comment: rare    ///     caffeine use       Fall Risk Screen  ARIANE Fall Risk Assessment was completed, and patient is at MODERATE risk for falls. Assessment completed on:2024    Depression Screenin/29/2024     4:00 PM   PHQ-2/PHQ-9 Depression Screening   Little Interest or Pleasure in Doing Things 1-->several days   Feeling Down, Depressed or Hopeless 0-->not at all   PHQ-9: Brief Depression Severity Measure Score 1     Health Habits and Functional and Cognitive Screenin/29/2024     4:00 PM   Functional & Cognitive Status   Do you have difficulty preparing food and eating? No   Do you have difficulty bathing yourself, getting dressed or grooming yourself? No   Do you have difficulty using the toilet? No   Do you have difficulty moving around from place to place? Yes   Do you have trouble with steps or getting out of a bed or a chair? Yes   Current Diet Well Balanced Diet   Dental Exam Not up to date   Eye Exam Not up to date   Exercise (times per week) 0 times per week   Current Exercises Include No Regular Exercise   Do you need help using the phone?  No   Are you deaf or do you have serious difficulty hearing?  No   Do you need help to go to places out of walking distance? Yes   Do you need help shopping? Yes   Do you need help preparing meals?  Yes   Do you need help with housework?  Yes   Do you need help with laundry? Yes   Do you need help taking your medications? No   Do you need help managing money? No   Do you ever drive or ride in a car without wearing a seat belt? No   Have you felt unusual stress, anger or loneliness in the last month? No   Who do you live with? Spouse   If you need help, do you have trouble finding someone available to you? No   Have you been bothered in the last four weeks by sexual problems? No   Do you have difficulty concentrating, remembering or making decisions? No            Age-appropriate Screening Schedule:  Refer to the list below for future screening recommendations based on patient's age, sex and/or medical conditions. Orders for these recommended tests are listed in the plan section. The patient has been provided with a written plan.    Health Maintenance List  Health Maintenance   Topic Date Due    RSV Vaccine - Adults (1 - 1-dose 60+ series) Never done    ZOSTER VACCINE (2 of 2) 03/03/2017    DXA SCAN  07/19/2018    PAP SMEAR  07/19/2019    MAMMOGRAM  09/13/2019    COLORECTAL CANCER SCREENING  02/09/2021    INFLUENZA VACCINE  08/01/2024    COVID-19 Vaccine (6 - 2023-24 season) 11/01/2024 (Originally 9/1/2023)    LIPID PANEL  02/08/2025    BMI FOLLOWUP  05/03/2025    ANNUAL WELLNESS VISIT  08/29/2025    TDAP/TD VACCINES (2 - Td or Tdap) 10/12/2026    Pneumococcal Vaccine 65+  Completed    HEPATITIS C SCREENING  Addressed                                                                                                                                                CMS Preventative Services Quick Reference  Risk Factors Identified During Encounter  Chronic Pain:  Continues on tramadol and gabapentin will increase dose  Inactivity/Sedentary: Patient was advised to exercise at least 150 minutes a week per CDC recommendations.    The above risks/problems have been discussed with the patient.  Pertinent information has been shared with the patient in the After Visit Summary.  An After Visit Summary and PPPS were made available to the patient.    Follow Up:   Next Medicare Wellness visit to be scheduled in 1 year.     Assessment & Plan               Follow Up:   No follow-ups on file.    Can update her RSV vaccine, shingles vaccine, Tdap at pharmacy and flu shot when available  Declines mammogram, colonoscopy screening, DEXA scan

## 2024-08-29 NOTE — PATIENT INSTRUCTIONS
Medicare Wellness  Personal Prevention Plan of Service     Date of Office Visit:    Encounter Provider:  Yuki Pennington PA-C  Place of Service:  [facility]  Patient Name: Miguelina Mueller  :  1945    As part of the Medicare Wellness portion of your visit today, we are providing you with this personalized preventive plan of services (PPPS). This plan is based upon recommendations of the [place] Preventive Services Task Force (USPSTF) and the Advisory Committee on Immunization Practices (ACIP).    This lists the preventive care services that should be considered, and provides dates of when you are due. Items listed as completed are up-to-date and do not require any further intervention.    Health Maintenance   Topic Date Due    RSV Vaccine - Adults (1 - 1-dose 60+ series) Never done    ZOSTER VACCINE (2 of 2) 2017    DXA SCAN  2018    PAP SMEAR  2019    MAMMOGRAM  2019    COLORECTAL CANCER SCREENING  2021    INFLUENZA VACCINE  2024    COVID-19 Vaccine (2023-24 season) 2024 (Originally 2023)    LIPID PANEL  2025    BMI FOLLOWUP  2025    ANNUAL WELLNESS VISIT  2025    TDAP/TD VACCINES (2 - Td or Tdap) 10/12/2026    Pneumococcal Vaccine 65+  Completed    HEPATITIS C SCREENING  Addressed       No orders of the defined types were placed in this encounter.      Return in about 3 months (around 2024), or if symptoms worsen or fail to improve.

## 2024-08-29 NOTE — PROGRESS NOTES
"Subjective   Miguelina Mueller is a 79 y.o. female.     History of Present Illness      Since the last visit, she has overall felt tired.  She has Impaired fasting glucose and will monitor labs to watch for DMII, Hyperlipidemia with goals met with current Rx, Atrial Fibillation and remains under the care of their cardiologist for management, and Vitamin D deficiency and labs are at goal >30 ng/mL.  she has been compliant with current medications have reviewed them.  The patient denies medication side effects.  Will refill medications. /76   Pulse 90   Temp 97.3 °F (36.3 °C)   Resp 16   Ht 163 cm (64.17\")   Wt 78 kg (172 lb)   LMP  (LMP Unknown)   SpO2 99%   BMI 29.37 kg/m² .        Cholesterol goals were met on February labs and note her iron studies were normal on February lab also folic acid, vitamin D, B12 were all at goal. Unable to give urine  Hemoglobin A1c was back in normal range on 8/26/2020  Results for orders placed or performed in visit on 08/26/24   Comprehensive Metabolic Panel    Specimen: Blood   Result Value Ref Range    Glucose 90 70 - 99 mg/dL    BUN 9 8 - 27 mg/dL    Creatinine 1.09 (H) 0.57 - 1.00 mg/dL    EGFR Result 52 (L) >59 mL/min/1.73    BUN/Creatinine Ratio 8 (L) 12 - 28    Sodium 140 134 - 144 mmol/L    Potassium 4.4 3.5 - 5.2 mmol/L    Chloride 102 96 - 106 mmol/L    Total CO2 21 20 - 29 mmol/L    Calcium 9.4 8.7 - 10.3 mg/dL    Total Protein 6.2 6.0 - 8.5 g/dL    Albumin 4.1 3.8 - 4.8 g/dL    Globulin 2.1 1.5 - 4.5 g/dL    Total Bilirubin 0.7 0.0 - 1.2 mg/dL    Alkaline Phosphatase 104 44 - 121 IU/L    AST (SGOT) 18 0 - 40 IU/L    ALT (SGPT) 13 0 - 32 IU/L   Hemoglobin A1c    Specimen: Blood   Result Value Ref Range    Hemoglobin A1C 5.6 4.8 - 5.6 %   Microalbumin / Creatinine Urine Ratio - Urine, Clean Catch    Specimen: Urine, Clean Catch   Result Value Ref Range    Creatinine, Urine CANCELED mg/dL    Microalbumin, Urine CANCELED    CBC & Differential    Specimen: Blood "   Result Value Ref Range    WBC 13.7 (H) 3.4 - 10.8 x10E3/uL    RBC 5.14 3.77 - 5.28 x10E6/uL    Hemoglobin 16.2 (H) 11.1 - 15.9 g/dL    Hematocrit 49.0 (H) 34.0 - 46.6 %    MCV 95 79 - 97 fL    MCH 31.5 26.6 - 33.0 pg    MCHC 33.1 31.5 - 35.7 g/dL    RDW 12.7 11.7 - 15.4 %    Platelets 218 150 - 450 x10E3/uL    Neutrophil Rel % 65 Not Estab. %    Lymphocyte Rel % 25 Not Estab. %    Monocyte Rel % 6 Not Estab. %    Eosinophil Rel % 3 Not Estab. %    Basophil Rel % 1 Not Estab. %    Neutrophils Absolute 8.9 (H) 1.4 - 7.0 x10E3/uL    Lymphocytes Absolute 3.4 (H) 0.7 - 3.1 x10E3/uL    Monocytes Absolute 0.9 0.1 - 0.9 x10E3/uL    Eosinophils Absolute 0.4 0.0 - 0.4 x10E3/uL    Basophils Absolute 0.1 0.0 - 0.2 x10E3/uL    Immature Granulocyte Rel % 0 Not Estab. %    Immature Grans Absolute 0.0 0.0 - 0.1 x10E3/uL   Urinalysis With Microscopic - Urine, Clean Catch    Specimen: Urine, Clean Catch   Result Value Ref Range    Specific Gravity, UA CANCELED     pH, UA CANCELED     Protein CANCELED     Glucose, UA CANCELED     Ketones CANCELED      She did have iron studies 2024 and did not have elevated iron levels  She does have stable chronic kidney disease 3A    Also takes Cymbalta for anxiety and depression and it does help some. She has had grief in the last year after the death of her daughter who  at age 40 of alcoholism. Declines med change or referral to psych  She was seen by Dr. Dover cardiologist on 2024 for evaluation of paroxysmal atrial fibrillation.    The following portions of the patient's history were reviewed and updated as appropriate: allergies, current medications, past family history, past medical history, past social history, past surgical history, and problem list.    Review of Systems   Constitutional:  Negative for diaphoresis.   HENT:  Negative for nosebleeds and trouble swallowing.    Eyes:  Negative for blurred vision and visual disturbance.   Respiratory:  Negative for choking.     Gastrointestinal:  Negative for blood in stool.   Allergic/Immunologic: Negative for immunocompromised state.   Neurological:  Negative for facial asymmetry and speech difficulty.   Psychiatric/Behavioral:  Negative for self-injury and suicidal ideas.        Objective   Physical Exam  Vitals and nursing note reviewed.   Constitutional:       General: She is not in acute distress.     Appearance: Normal appearance. She is well-developed. She is not ill-appearing or toxic-appearing.   HENT:      Head: Normocephalic.      Right Ear: External ear normal. There is impacted cerumen.      Left Ear: Tympanic membrane, ear canal and external ear normal.      Nose: Nose normal. No rhinorrhea.      Mouth/Throat:      Pharynx: Oropharynx is clear.   Eyes:      General: No scleral icterus.     Conjunctiva/sclera: Conjunctivae normal.      Pupils: Pupils are equal, round, and reactive to light.   Neck:      Thyroid: No thyromegaly.   Cardiovascular:      Rate and Rhythm: Rhythm irregular.      Pulses: Normal pulses.      Heart sounds: No murmur heard.  Pulmonary:      Effort: Pulmonary effort is normal. No respiratory distress.      Breath sounds: Normal breath sounds.   Abdominal:      Tenderness: There is no abdominal tenderness.   Musculoskeletal:         General: No deformity. Normal range of motion.      Cervical back: Normal range of motion and neck supple.      Right lower leg: No edema.      Left lower leg: No edema.   Lymphadenopathy:      Cervical: No cervical adenopathy.   Skin:     General: Skin is warm and dry.      Findings: Lesion present. No rash.      Comments: Dry skin  Skin lesions left upper chest X 2---irreg and not healing, scabbed areas   Neurological:      General: No focal deficit present.      Mental Status: She is alert and oriented to person, place, and time. Mental status is at baseline.   Psychiatric:         Behavior: Behavior normal.         Thought Content: Thought content normal.          Judgment: Judgment normal.           Assessment & Plan   Diagnoses and all orders for this visit:    1. Mixed hyperlipidemia (Primary)    2. Fibromyalgia  Comments:  Continue Cymbalta and tramadol helped some  Orders:  -     gabapentin (NEURONTIN) 300 MG capsule; Take 1 capsule by mouth 3 (Three) Times a Day. New dose for fibromyalgia pain  Dispense: 90 capsule; Refill: 5  -     traMADol (ULTRAM) 50 MG tablet; Take 1 tablet by mouth Every 6 (Six) Hours As Needed for Moderate Pain. for pain  Dispense: 360 tablet; Refill: 0        Plan, Miguelina Mueller, was seen today.  she was seen for Imparied fasting glucose and plan follow up labs, diet, and exercise, Hyperlipidemia and will continue current medication, Atrial Fibrillation and remains on medication for treatment and is stable, Atrial Fibrillation and remains under the care of their cardiologist for medical management and is stable, and Vitamin D deficiency and supplemented.  Atypical nevi on chest X 2---needs to see derm--declines for now  She continues to have polycythemia with elevated lymphocytes, neutrophils, white blood cell count, hemoglobin, hematocrit----I still advise workup with hematology and she declines for now will let me know if that changes  Urine pending--  Make sure drug screen done  Continues on Wellbutrin for anxiety and depression does help can refer to psychiatry for further evaluation at any time if desired  Continue folic acid, vitamin D and B12 3 times a week working with  Continues on tramadol and gabapentin for fibromyalgia pain and does help  Continues under care of Dr. Dover for management of A-fib remains on Xarelto for anticoagulation   Can refer to pulmonologist for the shortness of breath at any time in future  Declines colon cancer screen

## 2024-08-29 NOTE — LETTER
Controlled Substance Prescribing Agreement          I, Miguelina Mueller [PATIENT],  1945 [] a patient of  Yuki Pennington PA-C   [PROVIDER] at [facility] [PRACTICE], have been informed that  individuals who are prescribed certain Controlled Substances including, but not limited to, narcotic pain medicines, stimulants, benzodiazepine tranquilizers, and barbiturate sedatives, can abuse those substances or may allow abuse by others, and have some risk of developing an addictive disorder or suffering a relapse of a prior addiction. Therefore, I have been informed that it is necessary to observe strict rules pertaining to their use, and I agree to follow the terms and procedures described in this Agreement as consideration for, and as a condition of, the willingness of the physician whose signature appears below to consider prescribing or to continue prescribing Controlled Substances to treat my pain.  Ultram and Gabapentin for Fibromyalgia pain.    1. I will inform my physician of any current or past substance abuse, or any current or past substance abuse of any immediate member of my immediate family.     2. I agree that I may be subject to a voluntary evaluation by psychologists and/or psychiatrists, possibly at my own expense, before any Controlled Substances will be prescribed to me. I agree that the need to be evaluated by psychologists and/or psychiatrists may be revisited every three (3) to six (6) months thereafter while taking the medication.     3. All Controlled Substances must come from a provider in the PROVIDER’S PRACTICE. My Controlled Substances will come from the PROVIDER whose signature appears below, or during his or her absence, by the covering provider, unless specific written authorization is obtained from the office for an exception.     4. I will obtain all Controlled Substances from the same pharmacy. Should the need arise to change pharmacies, I will inform  the PROVIDER’S office.     5. I will inform the PROVIDER’S office of any new medications or medical conditions, and of any adverse effects I experience from any of the medications that I take.     6. I will inform my other health care providers that I am taking the Controlled Substances listed above, and of the existence of this Agreement. In the event of an emergency, I will provide the foregoing information to emergency department providers.     7. I agree that my prescribing PROVIDER has permission to discuss all diagnostic and treatment details with other health care providers, pharmacists, or other professionals who provide my health care regarding my use of Controlled Substances for purposes of maintaining accountability.     8. I will not allow anyone else to have, use sell, or otherwise have access to these medications. The sharing of medications with anyone is absolutely forbidden and is against the law.         9. I understand that Controlled Substances may be hazardous or lethal to a person who is not tolerant to their effects, especially a child, and that I must keep them out of reach of such people for their own safety.     10. I understand that tampering with a written prescription is a felony and I will not change or tamper with the PROVIDER’S written prescription.     11. I am aware that attempting to obtain a Controlled Substance under false pretenses is illegal.     12. I agree not to alter my medication in any way, and I will take my medication whole, and it will not be broken, chewed, crushed, injected, or snorted.     13. I will take my medication as instructed and prescribed, and I will not exceed the maximum prescribed dose. Any change in dosage must be approved by the PROVIDER or a physician within the PRACTICE.     14. I understand that these drugs should not be stopped abruptly, as withdrawal syndromes may develop.     15. I will cooperate with unannounced urine or serum toxicology  screenings as may be requested, as well as any random pill counts of medication by the PROVIDER. Failure to comply may result in termination of the PROVIDER-patient relationship.     16. I understand that the presence of unauthorized and/or illegal substances in the screenings described in the paragraph above may prompt referral for assessment for a substance abuse disorder or termination of the PROVIDER-patient relationship.     17. I understand that medications may not be replaced if they are lost, damaged, or stolen. If any of these situations arise that cause me to request an early refill of my medication, a copy of a filed police report or a statement from me explaining the circumstances may be required before additional prescriptions are considered. If I request an early refill secondary to lost, damaged, or stolen prescriptions twice within a year, I may be discharged from the practice.     18. I understand that a prescription may be given early if the PROVIDER or the patient will be out of town when the refill is due. These prescriptions will contain instructions to the pharmacist that the prescriptions(s) may not be filled prior to the appropriate date.     19. If the responsible legal authorities have questions concerning my treatment, as may occur, for example, if I obtained medication at several pharmacies, all confidentiality is waived, and these authorities may be given full access to my full records of Controlled Substances administration.     20. I will keep my scheduled appointments in order to receive medication renewals. If I need to cancel my appointment, I will do so a minimum of twenty-four (24) hours before it is scheduled.     21. I understand that I may be asked to bring my medications in their original container to the PROVIDER’s office while I am on controlled medication.     22. Refills generally will not be given over the phone, after office hours, during the weekends, and on holidays.      23. I understand that any medical treatment is initially a trial, with the goal of treatment being to improve the quality of life and ability to function and/or work. These parameters will be assessed periodically to determine the benefits of continued therapy, and continued prescription is contingent on whether my physician believes that the medication usage benefits me. I will comply with all treatments as outlined by the PROVIDER.     24. I have been explained the risks and potential benefits of these therapies, including, but not limited to, psychological addiction, physical dependence, withdrawal and over dosage.     25. I understand that failure to adhere to these policies and/or failure to comply with the PROVIDER’S treatment plan may result in cessation of therapy with Controlled Substance prescribing by the PROVIDER or referral for further specialty assessment, as well as possible discharge from the PRACTICE.     26. I, the undersigned patient, attest that the foregoing was discussed with me, and that I have read, fully understand, and agree to all of the above requirements and instructions. I affirm that I have the full right and power to sign and be bound by this  Agreement.         Date:  __________________________________________    Time:  __________________________________________    Patient Printed Name:  _____________________________    Patient Signature:  ________________________________           Date:  __________________________________________    Time:  __________________________________________    Provider Signature:  _______________________________

## 2024-08-30 LAB
1OH-MIDAZOLAM UR QL SCN: NOT DETECTED NG/MG CREAT
6MAM UR QL SCN: NEGATIVE NG/ML
7AMINOCLONAZEPAM/CREAT UR: NOT DETECTED NG/MG CREAT
A-OH ALPRAZ/CREAT UR: NOT DETECTED NG/MG CREAT
A-OH-TRIAZOLAM/CREAT UR CFM: NOT DETECTED NG/MG CREAT
ACP UR QL CFM: NOT DETECTED
ALPRAZ/CREAT UR CFM: NOT DETECTED NG/MG CREAT
AMPHETAMINES UR QL SCN: NEGATIVE NG/ML
APAP UR QL SCN: NEGATIVE UG/ML
BARBITURATES UR QL SCN: NEGATIVE NG/ML
BENZODIAZ SCN METH UR: NEGATIVE
BUPRENORPHINE UR QL SCN: NEGATIVE
BUPRENORPHINE/CREAT UR: NOT DETECTED NG/MG CREAT
CANNABINOIDS UR QL SCN: NEGATIVE NG/ML
CARISOPRODOL UR QL: NEGATIVE NG/ML
CLONAZEPAM/CREAT UR CFM: NOT DETECTED NG/MG CREAT
COCAINE+BZE UR QL SCN: NEGATIVE NG/ML
CREAT UR-MCNC: 46 MG/DL
D-METHORPHAN UR-MCNC: NOT DETECTED NG/ML
D-METHORPHAN+LEVORPHANOL UR QL: NOT DETECTED
DESALKYLFLURAZ/CREAT UR: NOT DETECTED NG/MG CREAT
DIAZEPAM/CREAT UR: NOT DETECTED NG/MG CREAT
ETHANOL UR QL SCN: NEGATIVE G/DL
ETHANOL UR QL SCN: NEGATIVE NG/ML
FENTANYL CTO UR SCN-MCNC: NEGATIVE NG/ML
FENTANYL/CREAT UR: NOT DETECTED NG/MG CREAT
FLUNITRAZEPAM UR QL SCN: NOT DETECTED NG/MG CREAT
GABAPENTIN UR CFM-MCNC: PRESENT NG/ML
GABAPENTIN UR QL CFM: NORMAL
GABAPENTIN UR-MCNC: NORMAL UG/ML
HALLUCINOGENS UR: NEGATIVE
HYPNOTICS UR QL SCN: NEGATIVE
KETAMINE UR QL: NOT DETECTED
LORAZEPAM/CREAT UR: NOT DETECTED NG/MG CREAT
MEPERIDINE UR QL SCN: NEGATIVE NG/ML
METHADONE UR QL SCN: NEGATIVE NG/ML
METHADONE+METAB UR QL SCN: NEGATIVE NG/ML
MIDAZOLAM/CREAT UR CFM: NOT DETECTED NG/MG CREAT
MISCELLANEOUS, UR: NEGATIVE
N-NORTRAMADOL/CREAT UR CFM: 5326 NG/MG CREAT
NORBUPRENORPHINE/CREAT UR: NOT DETECTED NG/MG CREAT
NORDIAZEPAM/CREAT UR: NOT DETECTED NG/MG CREAT
NORFENTANYL/CREAT UR: NOT DETECTED NG/MG CREAT
NORFLUNITRAZEPAM UR-MCNC: NOT DETECTED NG/MG CREAT
NORKETAMINE UR-MCNC: NOT DETECTED UG/ML
O-NORTRAMADOL UR CFM-MCNC: NORMAL NG/MG CREAT
OPIATES UR SCN-MCNC: NEGATIVE NG/ML
OXAZEPAM/CREAT UR: NOT DETECTED NG/MG CREAT
OXYCODONE CTO UR SCN-MCNC: NEGATIVE NG/ML
PCP UR QL SCN: NEGATIVE NG/ML
PRESCRIBED MEDICATIONS: NORMAL
PROPOXYPH UR QL SCN: NEGATIVE NG/ML
TAPENTADOL CTO UR SCN-MCNC: NEGATIVE NG/ML
TEMAZEPAM/CREAT UR: NOT DETECTED NG/MG CREAT
TRAMADOL UR CFM-MCNC: NORMAL NG/MG CREAT
TRAMADOL UR QL CFM: NORMAL
TRAMADOL UR QL SCN: NORMAL NG/ML
ZALEPLON UR-MCNC: NOT DETECTED NG/ML
ZOLPIDEM PHENYL-4-CARB UR QL SCN: NOT DETECTED
ZOLPIDEM UR QL SCN: NOT DETECTED
ZOPICLONE-N-OXIDE UR-MCNC: NOT DETECTED NG/ML

## 2024-09-03 NOTE — Clinical Note
Level of Care: Telemetry [5]   Patient Class: Observation [104]  
pt will speak with the surgeon and the  anesthesiologist preop

## 2024-10-01 RX ORDER — DULOXETIN HYDROCHLORIDE 60 MG/1
CAPSULE, DELAYED RELEASE ORAL
Qty: 30 CAPSULE | Refills: 11 | Status: SHIPPED | OUTPATIENT
Start: 2024-10-01

## 2024-11-25 ENCOUNTER — OFFICE VISIT (OUTPATIENT)
Dept: FAMILY MEDICINE CLINIC | Facility: CLINIC | Age: 79
End: 2024-11-25
Payer: MEDICARE

## 2024-11-25 VITALS
WEIGHT: 181 LBS | HEART RATE: 84 BPM | OXYGEN SATURATION: 95 % | HEIGHT: 64 IN | SYSTOLIC BLOOD PRESSURE: 120 MMHG | TEMPERATURE: 99.1 F | BODY MASS INDEX: 30.9 KG/M2 | DIASTOLIC BLOOD PRESSURE: 80 MMHG

## 2024-11-25 DIAGNOSIS — E53.8 LOW FOLIC ACID: ICD-10-CM

## 2024-11-25 DIAGNOSIS — M79.7 FIBROMYALGIA: Chronic | ICD-10-CM

## 2024-11-25 DIAGNOSIS — F41.9 ANXIETY: ICD-10-CM

## 2024-11-25 DIAGNOSIS — I48.0 PAF (PAROXYSMAL ATRIAL FIBRILLATION): ICD-10-CM

## 2024-11-25 DIAGNOSIS — N18.31 STAGE 3A CHRONIC KIDNEY DISEASE: ICD-10-CM

## 2024-11-25 DIAGNOSIS — Z23 NEED FOR VACCINATION: Primary | ICD-10-CM

## 2024-11-25 DIAGNOSIS — E78.2 MIXED HYPERLIPIDEMIA: ICD-10-CM

## 2024-11-25 DIAGNOSIS — E53.8 B12 DEFICIENCY: ICD-10-CM

## 2024-11-25 DIAGNOSIS — M79.7 FIBROMYALGIA: ICD-10-CM

## 2024-11-25 DIAGNOSIS — R06.09 EXERTIONAL DYSPNEA: ICD-10-CM

## 2024-11-25 DIAGNOSIS — F32.5 MAJOR DEPRESSION IN REMISSION: ICD-10-CM

## 2024-11-25 DIAGNOSIS — D72.829 LEUKOCYTOSIS, UNSPECIFIED TYPE: ICD-10-CM

## 2024-11-25 DIAGNOSIS — E55.9 VITAMIN D DEFICIENCY: ICD-10-CM

## 2024-11-25 DIAGNOSIS — D63.8 ANEMIA OF CHRONIC DISEASE: ICD-10-CM

## 2024-11-25 DIAGNOSIS — R73.01 IFG (IMPAIRED FASTING GLUCOSE): ICD-10-CM

## 2024-11-25 PROCEDURE — G0008 ADMIN INFLUENZA VIRUS VAC: HCPCS | Performed by: PHYSICIAN ASSISTANT

## 2024-11-25 PROCEDURE — 3079F DIAST BP 80-89 MM HG: CPT | Performed by: PHYSICIAN ASSISTANT

## 2024-11-25 PROCEDURE — 91320 SARSCV2 VAC 30MCG TRS-SUC IM: CPT | Performed by: PHYSICIAN ASSISTANT

## 2024-11-25 PROCEDURE — 90662 IIV NO PRSV INCREASED AG IM: CPT | Performed by: PHYSICIAN ASSISTANT

## 2024-11-25 PROCEDURE — 1159F MED LIST DOCD IN RCRD: CPT | Performed by: PHYSICIAN ASSISTANT

## 2024-11-25 PROCEDURE — 99214 OFFICE O/P EST MOD 30 MIN: CPT | Performed by: PHYSICIAN ASSISTANT

## 2024-11-25 PROCEDURE — 3074F SYST BP LT 130 MM HG: CPT | Performed by: PHYSICIAN ASSISTANT

## 2024-11-25 PROCEDURE — 1126F AMNT PAIN NOTED NONE PRSNT: CPT | Performed by: PHYSICIAN ASSISTANT

## 2024-11-25 PROCEDURE — 1160F RVW MEDS BY RX/DR IN RCRD: CPT | Performed by: PHYSICIAN ASSISTANT

## 2024-11-25 PROCEDURE — 90480 ADMN SARSCOV2 VAC 1/ONLY CMP: CPT | Performed by: PHYSICIAN ASSISTANT

## 2024-11-25 RX ORDER — TRAMADOL HYDROCHLORIDE 50 MG/1
50 TABLET ORAL EVERY 6 HOURS PRN
Qty: 360 TABLET | Refills: 0 | Status: SHIPPED | OUTPATIENT
Start: 2024-11-25

## 2024-11-25 NOTE — PROGRESS NOTES
Subjective   Miguelina Mueller is a 79 y.o. female.     History of Present Illness  The patient presents for follow-up on multiple medical concerns.    She has been experiencing shortness of breath during physical activity, such as walking, but not at rest. She reports no coughing or wheezing. Despite a previous order for a chest CT scan, she has not yet undergone the procedure. She has no history of asthma. She has an upcoming appointment with her cardiologist on 02/22/2025. She has been told that she snores but has not considered a sleep study due to perceived intolerance. Her sleep pattern is irregular, with frequent daytime napping and interrupted nighttime sleep. She reports never feeling rested, a condition she recalls dating back to her teenage years.    She is currently taking gabapentin and tramadol for chronic fibromyalgia pain. She reports that the gabapentin has been effective in managing her pain.    She is also on Wellbutrin and Cymbalta for depression. She reports that the gabapentin does not seem to have an impact on her mood. She does not currently feel depressed but admits to experiencing anxiety. She was previously prescribed BuSpar in 2020 but discontinued it due to adverse effects.    She is currently taking folic acid and B12 supplements. She has noticed some swelling in her ankles, which she attributes to prolonged periods of standing or sitting. She has gained significant weight since her last visit.    FAMILY HISTORY  She denies any family history of blood clots.   The patient has read and signed the [facility] Controlled Substance Contract.  I will continue to see patient for regular follow up appointments.  They are well controlled on their medication.  NATA has been reviewed by me and is updated every 3 months. The patient is aware of the potential for addiction and dependence.      The following portions of the patient's history were reviewed and updated as appropriate: allergies,  current medications, past family history, past medical history, past social history, past surgical history, and problem list.    Review of Systems   Constitutional:  Positive for fatigue. Negative for diaphoresis.   HENT:  Negative for nosebleeds and trouble swallowing.    Eyes:  Negative for blurred vision and visual disturbance.   Respiratory:  Positive for shortness of breath. Negative for choking.    Gastrointestinal:  Negative for blood in stool.   Musculoskeletal:  Positive for arthralgias and myalgias.   Allergic/Immunologic: Negative for immunocompromised state.   Neurological:  Negative for facial asymmetry and speech difficulty.   Psychiatric/Behavioral:  Negative for self-injury and suicidal ideas.        Objective   Physical Exam  Vitals and nursing note reviewed.   Constitutional:       General: She is not in acute distress.     Appearance: She is well-developed. She is not ill-appearing or toxic-appearing.   HENT:      Head: Normocephalic.      Right Ear: External ear normal.      Left Ear: External ear normal.      Nose: Nose normal.      Mouth/Throat:      Pharynx: Oropharynx is clear.   Eyes:      General: No scleral icterus.     Conjunctiva/sclera: Conjunctivae normal.      Pupils: Pupils are equal, round, and reactive to light.   Neck:      Thyroid: No thyromegaly.   Cardiovascular:      Rate and Rhythm: Rhythm irregular.      Pulses: Normal pulses.      Heart sounds: Normal heart sounds. No murmur heard.  Pulmonary:      Effort: Pulmonary effort is normal. No respiratory distress.      Breath sounds: Normal breath sounds. No rales.   Musculoskeletal:         General: No deformity. Normal range of motion.      Cervical back: Normal range of motion and neck supple.      Right lower leg: Edema present.      Left lower leg: Edema present.   Skin:     General: Skin is warm and dry.      Findings: No rash.   Neurological:      General: No focal deficit present.      Mental Status: She is alert and  oriented to person, place, and time. Mental status is at baseline.   Psychiatric:         Mood and Affect: Mood normal.         Behavior: Behavior normal.         Thought Content: Thought content normal.         Judgment: Judgment normal.         Physical Exam         Results  Laboratory Studies  Iron studies were normal. Folic acid, D and B12 were normal. A1c was back to normal range.       Assessment & Plan   Diagnoses and all orders for this visit:    1. Need for vaccination (Primary)  -     Fluzone High-Dose 65+yrs (2098-0790)  -     COVID-19 (Pfizer) 12yrs+ (COMIRNATY)    2. Fibromyalgia  -     traMADol (ULTRAM) 50 MG tablet; Take 1 tablet by mouth Every 6 (Six) Hours As Needed for Moderate Pain. for pain  Dispense: 360 tablet; Refill: 0    3. IFG (impaired fasting glucose)    4. PAF (paroxysmal atrial fibrillation)    5. Exertional dyspnea  -     XR Chest PA & Lateral; Future    6. Anemia of chronic disease    7. B12 deficiency    8. Low folic acid    9. Vitamin D deficiency    10. Stage 3a chronic kidney disease    11. Mixed hyperlipidemia    12. Leukocytosis, unspecified type  -     Ambulatory Referral to Hematology / Oncology    13. Anxiety    14. Major depression in remission    15. Fibromyalgia  Comments:  Continue Cymbalta and tramadol helped some  Orders:  -     traMADol (ULTRAM) 50 MG tablet; Take 1 tablet by mouth Every 6 (Six) Hours As Needed for Moderate Pain. for pain  Dispense: 360 tablet; Refill: 0        Assessment & Plan  1. Exertional dyspnea.  Her echocardiogram conducted by Dr. Kimbrough in 11/2024 showed no structural abnormalities, although her right ventricular systolic pressure was slightly elevated at 43 mmHg. The possibility of pulmonary embolism was considered, but her symptoms do not align with this diagnosis. Deconditioning due to limited physical activity could also be contributing to her shortness of breath. A chest x-ray will be ordered. If the results are normal, a consultation  with a pulmonologist will be arranged for further evaluation. Pulmonary function testing may be considered if the x-ray is normal. She is advised to elevate her feet above heart level when possible and monitor her salt intake.    2. Chronic fibromyalgia.  Her prescriptions for gabapentin and tramadol will be refilled. She will continue her current regimen of Cymbalta for fibromyalgia pain, anxiety, and depression.    3. Major depression in remission.  She will continue her current regimen of Cymbalta and Wellbutrin. The potential side effects of gabapentin and tramadol, such as drowsiness, were discussed.    4. Chronic kidney disease stage 3a.  Her kidney function remains stable. She is advised to avoid ibuprofen.    5. Leukocytosis.  Her white blood cell count continues to be elevated. A referral to a hematologist will be made for further evaluation.    6. Health maintenance.  Her urine drug screen conducted on 08/27/2024 was normal. Her iron studies, folic acid, vitamin D, and B12 levels were all within normal limits as of 02/2024. Her A1c has returned to the normal range.    Follow-up  Return in 3 months for follow up.            Declines DEXA and mammo      Patient or patient representative verbalized consent for the use of Ambient Listening during the visit with  Yuki Pennington PA-C for chart documentation. 11/25/2024  15:34 EST

## 2024-11-25 NOTE — PATIENT INSTRUCTIONS
Fall Prevention in the Home, Adult  Falls can cause injuries and affect people of all ages. There are many simple things that you can do to make your home safe and to help prevent falls.  If you need it, ask for help making these changes.  What actions can I take to prevent falls?  General information  Use good lighting in all rooms. Make sure to:  Replace any light bulbs that burn out.  Turn on lights if it is dark and use night-lights.  Keep items that you use often in easy-to-reach places. Lower the shelves around your home if needed.  Move furniture so that there are clear paths around it.  Do not keep throw rugs or other things on the floor that can make you trip.  If any of your floors are uneven, fix them.  Add color or contrast paint or tape to clearly cyndi and help you see:  Grab bars or handrails.  First and last steps of staircases.  Where the edge of each step is.  If you use a ladder or stepladder:  Make sure that it is fully opened. Do not climb a closed ladder.  Make sure the sides of the ladder are locked in place.  Have someone hold the ladder while you use it.  Know where your pets are as you move through your home.  What can I do in the bathroom?         Keep the floor dry. Clean up any water that is on the floor right away.  Remove soap buildup in the bathtub or shower. Buildup makes bathtubs and showers slippery.  Use non-skid mats or decals on the floor of the bathtub or shower.  Attach bath mats securely with double-sided, non-slip rug tape.  If you need to sit down while you are in the shower, use a non-slip stool.  Install grab bars by the toilet and in the bathtub and shower. Do not use towel bars as grab bars.  What can I do in the bedroom?  Make sure that you have a light by your bed that is easy to reach.  Do not use any sheets or blankets on your bed that hang to the floor.  Have a firm bench or chair with side arms that you can use for support when you get dressed.  What can I do in  the kitchen?  Clean up any spills right away.  If you need to reach something above you, use a sturdy step stool that has a grab bar.  Keep electrical cables out of the way.  Do not use floor polish or wax that makes floors slippery.  What can I do with my stairs?  Do not leave anything on the stairs.  Make sure that you have a light switch at the top and the bottom of the stairs. Have them installed if you do not have them.  Make sure that there are handrails on both sides of the stairs. Fix handrails that are broken or loose. Make sure that handrails are as long as the staircases.  Install non-slip stair treads on all stairs in your home if they do not have carpet.  Avoid having throw rugs at the top or bottom of stairs, or secure the rugs with carpet tape to prevent them from moving.  Choose a carpet design that does not hide the edge of steps on the stairs. Make sure that carpet is firmly attached to the stairs. Fix any carpet that is loose or worn.  What can I do on the outside of my home?  Use bright outdoor lighting.  Repair the edges of walkways and driveways and fix any cracks. Clear paths of anything that can make you trip, such as tools or rocks.  Add color or contrast paint or tape to clearly cyndi and help you see high doorway thresholds.  Trim any bushes or trees on the main path into your home.  Check that handrails are securely fastened and in good repair. Both sides of all steps should have handrails.  Install guardrails along the edges of any raised decks or porches.  Have leaves, snow, and ice cleared regularly. Use sand, salt, or ice melt on walkways during winter months if you live where there is ice and snow.  In the garage, clean up any spills right away, including grease or oil spills.  What other actions can I take?  Review your medicines with your health care provider. Some medicines can make you confused or feel dizzy. This can increase your chance of falling.  Wear closed-toe shoes that  fit well and support your feet. Wear shoes that have rubber soles and low heels.  Use a cane, walker, scooter, or crutches that help you move around if needed.  Talk with your provider about other ways that you can decrease your risk of falls. This may include seeing a physical therapist to learn to do exercises to improve movement and strength.  Where to find more information  Centers for Disease Control and Prevention, [place]: cdc.gov  [place] on Aging: angi.nih.gov  [place] on Aging: angi.nih.gov  Contact a health care provider if:  You are afraid of falling at home.  You feel weak, drowsy, or dizzy at home.  You fall at home.  Get help right away if you:  Lose consciousness or have trouble moving after a fall.  Have a fall that causes a head injury.  These symptoms may be an emergency. Get help right away. Call 911.  Do not wait to see if the symptoms will go away.  Do not drive yourself to the hospital.  This information is not intended to replace advice given to you by your health care provider. Make sure you discuss any questions you have with your health care provider.  Document Revised: 08/21/2023 Document Reviewed: 08/21/2023  Elsevier Patient Education © 2024 [employer]     In 1-3 therapy sessions pt will be able to ambulate 50 ft with contact guard and with appropriate assistive device.

## 2024-12-05 DIAGNOSIS — M79.7 FIBROMYALGIA: Primary | ICD-10-CM

## 2024-12-05 DIAGNOSIS — E78.2 MIXED HYPERLIPIDEMIA: ICD-10-CM

## 2024-12-05 DIAGNOSIS — D63.8 ANEMIA OF CHRONIC DISEASE: ICD-10-CM

## 2024-12-05 DIAGNOSIS — D72.829 LEUKOCYTOSIS, UNSPECIFIED TYPE: ICD-10-CM

## 2024-12-05 DIAGNOSIS — R73.01 IFG (IMPAIRED FASTING GLUCOSE): ICD-10-CM

## 2024-12-05 DIAGNOSIS — N18.31 STAGE 3A CHRONIC KIDNEY DISEASE: ICD-10-CM

## 2025-01-21 DIAGNOSIS — M79.7 FIBROMYALGIA: Chronic | ICD-10-CM

## 2025-01-21 RX ORDER — TRAMADOL HYDROCHLORIDE 50 MG/1
50 TABLET ORAL EVERY 6 HOURS PRN
Qty: 120 TABLET | Refills: 0 | Status: SHIPPED | OUTPATIENT
Start: 2025-01-21

## 2025-02-10 RX ORDER — RIVAROXABAN 20 MG/1
20 TABLET, FILM COATED ORAL DAILY
Qty: 30 TABLET | Refills: 11 | Status: SHIPPED | OUTPATIENT
Start: 2025-02-10

## 2025-02-17 DIAGNOSIS — M79.7 FIBROMYALGIA: Chronic | ICD-10-CM

## 2025-02-17 RX ORDER — TRAMADOL HYDROCHLORIDE 50 MG/1
50 TABLET ORAL EVERY 6 HOURS PRN
Qty: 120 TABLET | Refills: 0 | Status: SHIPPED | OUTPATIENT
Start: 2025-02-17

## 2025-02-25 ENCOUNTER — OFFICE VISIT (OUTPATIENT)
Dept: FAMILY MEDICINE CLINIC | Facility: CLINIC | Age: 80
End: 2025-02-25
Payer: MEDICARE

## 2025-02-25 VITALS
BODY MASS INDEX: 30.73 KG/M2 | WEIGHT: 180 LBS | DIASTOLIC BLOOD PRESSURE: 78 MMHG | HEIGHT: 64 IN | SYSTOLIC BLOOD PRESSURE: 122 MMHG | TEMPERATURE: 97.9 F | HEART RATE: 61 BPM | RESPIRATION RATE: 16 BRPM | OXYGEN SATURATION: 96 %

## 2025-02-25 DIAGNOSIS — D72.829 LEUKOCYTOSIS, UNSPECIFIED TYPE: ICD-10-CM

## 2025-02-25 DIAGNOSIS — N18.31 STAGE 3A CHRONIC KIDNEY DISEASE: ICD-10-CM

## 2025-02-25 DIAGNOSIS — I48.0 PAF (PAROXYSMAL ATRIAL FIBRILLATION): ICD-10-CM

## 2025-02-25 DIAGNOSIS — R06.09 EXERTIONAL DYSPNEA: ICD-10-CM

## 2025-02-25 DIAGNOSIS — D63.8 ANEMIA OF CHRONIC DISEASE: ICD-10-CM

## 2025-02-25 DIAGNOSIS — E53.8 LOW FOLIC ACID: ICD-10-CM

## 2025-02-25 DIAGNOSIS — M79.7 FIBROMYALGIA: Chronic | ICD-10-CM

## 2025-02-25 DIAGNOSIS — F33.41 RECURRENT MAJOR DEPRESSIVE DISORDER, IN PARTIAL REMISSION: ICD-10-CM

## 2025-02-25 DIAGNOSIS — R73.01 IFG (IMPAIRED FASTING GLUCOSE): ICD-10-CM

## 2025-02-25 DIAGNOSIS — F41.9 ANXIETY: ICD-10-CM

## 2025-02-25 DIAGNOSIS — E78.2 MIXED HYPERLIPIDEMIA: ICD-10-CM

## 2025-02-25 DIAGNOSIS — E53.8 B12 DEFICIENCY: ICD-10-CM

## 2025-02-25 DIAGNOSIS — M79.7 FIBROMYALGIA: Primary | ICD-10-CM

## 2025-02-25 DIAGNOSIS — E55.9 VITAMIN D DEFICIENCY: ICD-10-CM

## 2025-02-25 PROCEDURE — 3078F DIAST BP <80 MM HG: CPT | Performed by: PHYSICIAN ASSISTANT

## 2025-02-25 PROCEDURE — 1126F AMNT PAIN NOTED NONE PRSNT: CPT | Performed by: PHYSICIAN ASSISTANT

## 2025-02-25 PROCEDURE — 1159F MED LIST DOCD IN RCRD: CPT | Performed by: PHYSICIAN ASSISTANT

## 2025-02-25 PROCEDURE — 3074F SYST BP LT 130 MM HG: CPT | Performed by: PHYSICIAN ASSISTANT

## 2025-02-25 PROCEDURE — 99214 OFFICE O/P EST MOD 30 MIN: CPT | Performed by: PHYSICIAN ASSISTANT

## 2025-02-25 PROCEDURE — 1160F RVW MEDS BY RX/DR IN RCRD: CPT | Performed by: PHYSICIAN ASSISTANT

## 2025-02-25 RX ORDER — ROSUVASTATIN CALCIUM 20 MG/1
20 TABLET, COATED ORAL DAILY
Qty: 30 TABLET | Refills: 11 | Status: SHIPPED | OUTPATIENT
Start: 2025-02-25

## 2025-02-25 RX ORDER — TRAMADOL HYDROCHLORIDE 50 MG/1
50 TABLET ORAL EVERY 6 HOURS PRN
Qty: 360 TABLET | Refills: 0 | Status: SHIPPED | OUTPATIENT
Start: 2025-02-25

## 2025-02-25 RX ORDER — GABAPENTIN 400 MG/1
400 CAPSULE ORAL 3 TIMES DAILY
Qty: 270 CAPSULE | Refills: 1 | Status: SHIPPED | OUTPATIENT
Start: 2025-02-25

## 2025-02-25 NOTE — PROGRESS NOTES
"Subjective   Miguelina Mueller is a 79 y.o. female.     Fibromyalgia      Since the last visit, she has overall felt tired.  She has Impaired fasting glucose and will monitor labs to watch for DMII, Hyperlipidemia with goals met with current Rx, Atrial Fibillation and remains under the care of their cardiologist for management, and Vitamin D deficiency and will update labs for continued management.  she has been compliant with current medications have reviewed them.  The patient denies medication side effects.  Will refill medications. /78   Pulse 61   Temp 97.9 °F (36.6 °C) (Temporal)   Resp 16   Ht 163 cm (64.17\")   Wt 81.6 kg (180 lb)   LMP  (LMP Unknown)   SpO2 96%   BMI 30.73 kg/m² .        Spouse passed 12-18-24.   Results for orders placed or performed in visit on 08/26/24   Comprehensive Metabolic Panel    Collection Time: 08/26/24  2:48 PM    Specimen: Blood   Result Value Ref Range    Glucose 90 70 - 99 mg/dL    BUN 9 8 - 27 mg/dL    Creatinine 1.09 (H) 0.57 - 1.00 mg/dL    EGFR Result 52 (L) >59 mL/min/1.73    BUN/Creatinine Ratio 8 (L) 12 - 28    Sodium 140 134 - 144 mmol/L    Potassium 4.4 3.5 - 5.2 mmol/L    Chloride 102 96 - 106 mmol/L    Total CO2 21 20 - 29 mmol/L    Calcium 9.4 8.7 - 10.3 mg/dL    Total Protein 6.2 6.0 - 8.5 g/dL    Albumin 4.1 3.8 - 4.8 g/dL    Globulin 2.1 1.5 - 4.5 g/dL    Total Bilirubin 0.7 0.0 - 1.2 mg/dL    Alkaline Phosphatase 104 44 - 121 IU/L    AST (SGOT) 18 0 - 40 IU/L    ALT (SGPT) 13 0 - 32 IU/L   Hemoglobin A1c    Collection Time: 08/26/24  2:48 PM    Specimen: Blood   Result Value Ref Range    Hemoglobin A1C 5.6 4.8 - 5.6 %   Microalbumin / Creatinine Urine Ratio - Urine, Clean Catch    Collection Time: 08/26/24  2:48 PM    Specimen: Urine, Clean Catch   Result Value Ref Range    Creatinine, Urine CANCELED mg/dL    Microalbumin, Urine CANCELED    CBC & Differential    Collection Time: 08/26/24  2:48 PM    Specimen: Blood   Result Value Ref Range    WBC " 13.7 (H) 3.4 - 10.8 x10E3/uL    RBC 5.14 3.77 - 5.28 x10E6/uL    Hemoglobin 16.2 (H) 11.1 - 15.9 g/dL    Hematocrit 49.0 (H) 34.0 - 46.6 %    MCV 95 79 - 97 fL    MCH 31.5 26.6 - 33.0 pg    MCHC 33.1 31.5 - 35.7 g/dL    RDW 12.7 11.7 - 15.4 %    Platelets 218 150 - 450 x10E3/uL    Neutrophil Rel % 65 Not Estab. %    Lymphocyte Rel % 25 Not Estab. %    Monocyte Rel % 6 Not Estab. %    Eosinophil Rel % 3 Not Estab. %    Basophil Rel % 1 Not Estab. %    Neutrophils Absolute 8.9 (H) 1.4 - 7.0 x10E3/uL    Lymphocytes Absolute 3.4 (H) 0.7 - 3.1 x10E3/uL    Monocytes Absolute 0.9 0.1 - 0.9 x10E3/uL    Eosinophils Absolute 0.4 0.0 - 0.4 x10E3/uL    Basophils Absolute 0.1 0.0 - 0.2 x10E3/uL    Immature Granulocyte Rel % 0 Not Estab. %    Immature Grans Absolute 0.0 0.0 - 0.1 x10E3/uL   Urinalysis With Microscopic - Urine, Clean Catch    Collection Time: 08/26/24  2:48 PM    Specimen: Urine, Clean Catch   Result Value Ref Range    Specific Gravity, UA CANCELED     pH, UA CANCELED     Protein CANCELED     Glucose, UA CANCELED     Ketones CANCELED    Urine drug screen 8/27/2024 was okay  Urine not obtained.  Kidney functions are about the same and consistent with chronic kidney disease stage IIIa--- will continue to monitor.  Liver labs are in range.  Normal glucose.  White blood cell count, hemoglobin, neutrophils remain elevated and so advised that she let me refer her to hematology for further workup..... Declined in past.  Hemoglobin A1c average glucose 2 to 3 months is in normal range.   Written by Yuki Pennington PA-C on 8/27/2024  5:21 PM EDT  She is on B12, folic acid, D  Assessment & Plan  1. Exertional dyspnea.  Her echocardiogram conducted by Dr. Najera in 11/6/23 showed no structural abnormalities, although her right ventricular systolic pressure was slightly elevated at 43 mmHg. The possibility of pulmonary embolism was considered, but her symptoms do not align with this diagnosis. Deconditioning due to limited  physical activity could also be contributing to her shortness of breath. A chest x-ray will be ordered. If the results are normal, a consultation with a pulmonologist will be arranged for further evaluation. Pulmonary function testing may be considered if the x-ray is normal. She is advised to elevate her feet above heart level when possible and monitor her salt intake.    She still needs to see pulm.---will ----she will set up---still want her to have CT chest  She continues to take Wellbutrin and Cymbalta for depression and grief and it helps but still gets breakthrough symptoms--- adding BuSpar in the past did not help.  She is not interested in changing medication or seeing psychiatry right now.  I do have her taking gabapentin along with tramadol for the chronic fibromyalgia pain and that does help  The patient has read and signed the [facility] Controlled Substance Contract.  I will continue to see patient for regular follow up appointments.  They are well controlled on their medication.  NATA has been reviewed by me and is updated every 3 months. The patient is aware of the potential for addiction and dependence.    The gabapentin has helped since adding it to the tramadol I do want to increase the dose to 400 mg noting her estimated creatinine clearance is 54  The following portions of the patient's history were reviewed and updated as appropriate: allergies, current medications, past family history, past medical history, past social history, past surgical history, and problem list.    Review of Systems    Objective   Physical Exam  Vitals and nursing note reviewed.   Constitutional:       General: She is not in acute distress.     Appearance: She is well-developed. She is not ill-appearing or toxic-appearing.   HENT:      Head: Normocephalic.      Right Ear: External ear normal.      Left Ear: External ear normal.      Nose: Nose normal.      Mouth/Throat:      Pharynx: Oropharynx is clear.   Eyes:       General: No scleral icterus.     Conjunctiva/sclera: Conjunctivae normal.      Pupils: Pupils are equal, round, and reactive to light.   Neck:      Thyroid: No thyromegaly.   Cardiovascular:      Rate and Rhythm: Normal rate and regular rhythm.      Heart sounds: Normal heart sounds. No murmur heard.  Pulmonary:      Effort: Pulmonary effort is normal. No respiratory distress.      Breath sounds: Normal breath sounds.   Musculoskeletal:         General: No deformity. Normal range of motion.      Cervical back: Normal range of motion and neck supple.      Right lower leg: Edema present.      Left lower leg: Edema present.      Comments: Trace pedal edema = bilat     Skin:     General: Skin is warm and dry.      Findings: Lesion present. No rash.      Comments: Atypical nevi on chest x 2 need to see dermatology wants to wait   Neurological:      General: No focal deficit present.      Mental Status: She is alert and oriented to person, place, and time. Mental status is at baseline.   Psychiatric:         Behavior: Behavior normal.         Thought Content: Thought content normal.         Judgment: Judgment normal.           Assessment & Plan   Diagnoses and all orders for this visit:    1. Fibromyalgia (Primary)  -     Comprehensive metabolic panel  -     Lipid panel  -     CBC and Differential  -     TSH  -     Hemoglobin A1c  -     T4, Free  -     Vitamin B12  -     Folate  -     Vitamin D,25-Hydroxy  -     Urinalysis With Microscopic - Urine, Clean Catch  -     Microalbumin / Creatinine Urine Ratio - Urine, Clean Catch  -     gabapentin (NEURONTIN) 400 MG capsule; Take 1 capsule by mouth 3 (Three) Times a Day. For neuropathy, new dose  Dispense: 270 capsule; Refill: 1  -     traMADol (ULTRAM) 50 MG tablet; Take 1 tablet by mouth Every 6 (Six) Hours As Needed for Moderate Pain.  Dispense: 360 tablet; Refill: 0    2. IFG (impaired fasting glucose)  -     Comprehensive metabolic panel  -     Lipid panel  -     CBC  and Differential  -     TSH  -     Hemoglobin A1c  -     T4, Free  -     Vitamin B12  -     Folate  -     Vitamin D,25-Hydroxy  -     Urinalysis With Microscopic - Urine, Clean Catch  -     Microalbumin / Creatinine Urine Ratio - Urine, Clean Catch    3. Stage 3a chronic kidney disease  -     Comprehensive metabolic panel  -     Lipid panel  -     CBC and Differential  -     TSH  -     Hemoglobin A1c  -     T4, Free  -     Vitamin B12  -     Folate  -     Vitamin D,25-Hydroxy  -     Urinalysis With Microscopic - Urine, Clean Catch  -     Microalbumin / Creatinine Urine Ratio - Urine, Clean Catch    4. Anemia of chronic disease  -     Comprehensive metabolic panel  -     Lipid panel  -     CBC and Differential  -     TSH  -     Hemoglobin A1c  -     T4, Free  -     Vitamin B12  -     Folate  -     Vitamin D,25-Hydroxy  -     Urinalysis With Microscopic - Urine, Clean Catch  -     Microalbumin / Creatinine Urine Ratio - Urine, Clean Catch    5. PAF (paroxysmal atrial fibrillation)  -     Comprehensive metabolic panel  -     Lipid panel  -     CBC and Differential  -     TSH  -     Hemoglobin A1c  -     T4, Free  -     Vitamin B12  -     Folate  -     Vitamin D,25-Hydroxy  -     Urinalysis With Microscopic - Urine, Clean Catch  -     Microalbumin / Creatinine Urine Ratio - Urine, Clean Catch    6. Vitamin D deficiency  -     Comprehensive metabolic panel  -     Lipid panel  -     CBC and Differential  -     TSH  -     Hemoglobin A1c  -     T4, Free  -     Vitamin B12  -     Folate  -     Vitamin D,25-Hydroxy  -     Urinalysis With Microscopic - Urine, Clean Catch  -     Microalbumin / Creatinine Urine Ratio - Urine, Clean Catch    7. Recurrent major depressive disorder, in partial remission  -     Comprehensive metabolic panel  -     Lipid panel  -     CBC and Differential  -     TSH  -     Hemoglobin A1c  -     T4, Free  -     Vitamin B12  -     Folate  -     Vitamin D,25-Hydroxy  -     Urinalysis With Microscopic -  Urine, Clean Catch  -     Microalbumin / Creatinine Urine Ratio - Urine, Clean Catch    8. Low folic acid  -     Comprehensive metabolic panel  -     Lipid panel  -     CBC and Differential  -     TSH  -     Hemoglobin A1c  -     T4, Free  -     Vitamin B12  -     Folate  -     Vitamin D,25-Hydroxy  -     Urinalysis With Microscopic - Urine, Clean Catch  -     Microalbumin / Creatinine Urine Ratio - Urine, Clean Catch    9. B12 deficiency  -     Comprehensive metabolic panel  -     Lipid panel  -     CBC and Differential  -     TSH  -     Hemoglobin A1c  -     T4, Free  -     Vitamin B12  -     Folate  -     Vitamin D,25-Hydroxy  -     Urinalysis With Microscopic - Urine, Clean Catch  -     Microalbumin / Creatinine Urine Ratio - Urine, Clean Catch    10. Leukocytosis, unspecified type  -     Comprehensive metabolic panel  -     Lipid panel  -     CBC and Differential  -     TSH  -     Hemoglobin A1c  -     T4, Free  -     Vitamin B12  -     Folate  -     Vitamin D,25-Hydroxy  -     Urinalysis With Microscopic - Urine, Clean Catch  -     Microalbumin / Creatinine Urine Ratio - Urine, Clean Catch    11. Mixed hyperlipidemia  -     Comprehensive metabolic panel  -     Lipid panel  -     CBC and Differential  -     TSH  -     Hemoglobin A1c  -     T4, Free  -     Vitamin B12  -     Folate  -     Vitamin D,25-Hydroxy  -     Urinalysis With Microscopic - Urine, Clean Catch  -     Microalbumin / Creatinine Urine Ratio - Urine, Clean Catch    12. Exertional dyspnea  -     Comprehensive metabolic panel  -     Lipid panel  -     CBC and Differential  -     TSH  -     Hemoglobin A1c  -     T4, Free  -     Vitamin B12  -     Folate  -     Vitamin D,25-Hydroxy  -     Urinalysis With Microscopic - Urine, Clean Catch  -     Microalbumin / Creatinine Urine Ratio - Urine, Clean Catch    13. Anxiety  -     Comprehensive metabolic panel  -     Lipid panel  -     CBC and Differential  -     TSH  -     Hemoglobin A1c  -     T4, Free  -      Vitamin B12  -     Folate  -     Vitamin D,25-Hydroxy  -     Urinalysis With Microscopic - Urine, Clean Catch  -     Microalbumin / Creatinine Urine Ratio - Urine, Clean Catch    14. Fibromyalgia  Comments:  Continue Cymbalta and tramadol helped some  Orders:  -     Comprehensive metabolic panel  -     Lipid panel  -     CBC and Differential  -     TSH  -     Hemoglobin A1c  -     T4, Free  -     Vitamin B12  -     Folate  -     Vitamin D,25-Hydroxy  -     Urinalysis With Microscopic - Urine, Clean Catch  -     Microalbumin / Creatinine Urine Ratio - Urine, Clean Catch  -     gabapentin (NEURONTIN) 400 MG capsule; Take 1 capsule by mouth 3 (Three) Times a Day. For neuropathy, new dose  Dispense: 270 capsule; Refill: 1  -     traMADol (ULTRAM) 50 MG tablet; Take 1 tablet by mouth Every 6 (Six) Hours As Needed for Moderate Pain.  Dispense: 360 tablet; Refill: 0      Continue taking folic acid, B12, vitamin D  Still need f/u on referral to see hematology for persistently elevated white blood cell count, hgb, and neutrophils  Still has shortness of breath and Dr. Dover cardiologist and I both want her to see the pulmonologist and had pulmonary function testing and do CT of the chest----she is aware  Still want urine micro---repeat CBC with diff, BMP, update.  TSH, Free T4, D, lipids.  Plan, Miguelina Arayajuana, was seen today.  she was seen for Imparied fasting glucose and plan follow up labs, diet, and exercise, Hyperlipidemia and will continue current medication, Atrial Fibrillation and remains on medication for treatment and is stable, Atrial Fibrillation and remains under the care of their cardiologist for medical management and is stable, and Vitamin D deficiency and will update labs .  Continue tramadol does help her fibromyalgia pain and will increase gabapentin to 400 mg 3 times daily for the fibromyalgia pain and if she is intolerant to this dose she will let me know

## 2025-02-25 NOTE — PATIENT INSTRUCTIONS
Fall Prevention in the Home, Adult  Falls can cause injuries and affect people of all ages. There are many simple things that you can do to make your home safe and to help prevent falls.  If you need it, ask for help making these changes.  What actions can I take to prevent falls?  General information  Use good lighting in all rooms. Make sure to:  Replace any light bulbs that burn out.  Turn on lights if it is dark and use night-lights.  Keep items that you use often in easy-to-reach places. Lower the shelves around your home if needed.  Move furniture so that there are clear paths around it.  Do not keep throw rugs or other things on the floor that can make you trip.  If any of your floors are uneven, fix them.  Add color or contrast paint or tape to clearly cyndi and help you see:  Grab bars or handrails.  First and last steps of staircases.  Where the edge of each step is.  If you use a ladder or stepladder:  Make sure that it is fully opened. Do not climb a closed ladder.  Make sure the sides of the ladder are locked in place.  Have someone hold the ladder while you use it.  Know where your pets are as you move through your home.  What can I do in the bathroom?         Keep the floor dry. Clean up any water that is on the floor right away.  Remove soap buildup in the bathtub or shower. Buildup makes bathtubs and showers slippery.  Use non-skid mats or decals on the floor of the bathtub or shower.  Attach bath mats securely with double-sided, non-slip rug tape.  If you need to sit down while you are in the shower, use a non-slip stool.  Install grab bars by the toilet and in the bathtub and shower. Do not use towel bars as grab bars.  What can I do in the bedroom?  Make sure that you have a light by your bed that is easy to reach.  Do not use any sheets or blankets on your bed that hang to the floor.  Have a firm bench or chair with side arms that you can use for support when you get dressed.  What can I do in  the kitchen?  Clean up any spills right away.  If you need to reach something above you, use a sturdy step stool that has a grab bar.  Keep electrical cables out of the way.  Do not use floor polish or wax that makes floors slippery.  What can I do with my stairs?  Do not leave anything on the stairs.  Make sure that you have a light switch at the top and the bottom of the stairs. Have them installed if you do not have them.  Make sure that there are handrails on both sides of the stairs. Fix handrails that are broken or loose. Make sure that handrails are as long as the staircases.  Install non-slip stair treads on all stairs in your home if they do not have carpet.  Avoid having throw rugs at the top or bottom of stairs, or secure the rugs with carpet tape to prevent them from moving.  Choose a carpet design that does not hide the edge of steps on the stairs. Make sure that carpet is firmly attached to the stairs. Fix any carpet that is loose or worn.  What can I do on the outside of my home?  Use bright outdoor lighting.  Repair the edges of walkways and driveways and fix any cracks. Clear paths of anything that can make you trip, such as tools or rocks.  Add color or contrast paint or tape to clearly cyndi and help you see high doorway thresholds.  Trim any bushes or trees on the main path into your home.  Check that handrails are securely fastened and in good repair. Both sides of all steps should have handrails.  Install guardrails along the edges of any raised decks or porches.  Have leaves, snow, and ice cleared regularly. Use sand, salt, or ice melt on walkways during winter months if you live where there is ice and snow.  In the garage, clean up any spills right away, including grease or oil spills.  What other actions can I take?  Review your medicines with your health care provider. Some medicines can make you confused or feel dizzy. This can increase your chance of falling.  Wear closed-toe shoes that  fit well and support your feet. Wear shoes that have rubber soles and low heels.  Use a cane, walker, scooter, or crutches that help you move around if needed.  Talk with your provider about other ways that you can decrease your risk of falls. This may include seeing a physical therapist to learn to do exercises to improve movement and strength.  Where to find more information  Centers for Disease Control and Prevention, [place]: cdc.gov  National [place] on Aging: angi.nih.gov  National [place] on Aging: angi.nih.gov  Contact a health care provider if:  You are afraid of falling at home.  You feel weak, drowsy, or dizzy at home.  You fall at home.  Get help right away if you:  Lose consciousness or have trouble moving after a fall.  Have a fall that causes a head injury.  These symptoms may be an emergency. Get help right away. Call 911.  Do not wait to see if the symptoms will go away.  Do not drive yourself to the hospital.  This information is not intended to replace advice given to you by your health care provider. Make sure you discuss any questions you have with your health care provider.  Document Revised: 08/21/2023 Document Reviewed: 08/21/2023  Elsevier Patient Education © 2024 [employer] Inc[employer]

## 2025-02-26 LAB
25(OH)D3+25(OH)D2 SERPL-MCNC: 41.4 NG/ML (ref 30–100)
ALBUMIN SERPL-MCNC: 4.3 G/DL (ref 3.8–4.8)
ALBUMIN/CREAT UR: 15 MG/G CREAT (ref 0–29)
ALP SERPL-CCNC: 131 IU/L (ref 44–121)
ALT SERPL-CCNC: 12 IU/L (ref 0–32)
APPEARANCE UR: CLEAR
AST SERPL-CCNC: 18 IU/L (ref 0–40)
BACTERIA #/AREA URNS HPF: ABNORMAL /[HPF]
BASOPHILS # BLD AUTO: 0.1 X10E3/UL (ref 0–0.2)
BASOPHILS NFR BLD AUTO: 1 %
BILIRUB SERPL-MCNC: 0.5 MG/DL (ref 0–1.2)
BILIRUB UR QL STRIP: NEGATIVE
BUN SERPL-MCNC: 12 MG/DL (ref 8–27)
BUN/CREAT SERPL: 11 (ref 12–28)
CALCIUM SERPL-MCNC: 9.8 MG/DL (ref 8.7–10.3)
CASTS URNS QL MICRO: ABNORMAL /LPF
CHLORIDE SERPL-SCNC: 105 MMOL/L (ref 96–106)
CHOLEST SERPL-MCNC: 140 MG/DL (ref 100–199)
CO2 SERPL-SCNC: 23 MMOL/L (ref 20–29)
COLOR UR: YELLOW
CREAT SERPL-MCNC: 1.09 MG/DL (ref 0.57–1)
CREAT UR-MCNC: 90.2 MG/DL
CRYSTALS URNS MICRO: ABNORMAL
EGFRCR SERPLBLD CKD-EPI 2021: 52 ML/MIN/1.73
EOSINOPHIL # BLD AUTO: 0.4 X10E3/UL (ref 0–0.4)
EOSINOPHIL NFR BLD AUTO: 3 %
EPI CELLS #/AREA URNS HPF: ABNORMAL /HPF (ref 0–10)
ERYTHROCYTE [DISTWIDTH] IN BLOOD BY AUTOMATED COUNT: 12.5 % (ref 11.7–15.4)
FOLATE SERPL-MCNC: >20 NG/ML
GLOBULIN SER CALC-MCNC: 2.3 G/DL (ref 1.5–4.5)
GLUCOSE SERPL-MCNC: 84 MG/DL (ref 70–99)
GLUCOSE UR QL STRIP: NEGATIVE
HBA1C MFR BLD: 5.8 % (ref 4.8–5.6)
HCT VFR BLD AUTO: 47.5 % (ref 34–46.6)
HDLC SERPL-MCNC: 64 MG/DL
HGB BLD-MCNC: 15.6 G/DL (ref 11.1–15.9)
HGB UR QL STRIP: NEGATIVE
IMM GRANULOCYTES # BLD AUTO: 0 X10E3/UL (ref 0–0.1)
IMM GRANULOCYTES NFR BLD AUTO: 0 %
KETONES UR QL STRIP: NEGATIVE
LDLC SERPL CALC-MCNC: 56 MG/DL (ref 0–99)
LEUKOCYTE ESTERASE UR QL STRIP: ABNORMAL
LYMPHOCYTES # BLD AUTO: 2.8 X10E3/UL (ref 0.7–3.1)
LYMPHOCYTES NFR BLD AUTO: 24 %
MCH RBC QN AUTO: 30.7 PG (ref 26.6–33)
MCHC RBC AUTO-ENTMCNC: 32.8 G/DL (ref 31.5–35.7)
MCV RBC AUTO: 94 FL (ref 79–97)
MICRO URNS: ABNORMAL
MICROALBUMIN UR-MCNC: 13.2 UG/ML
MONOCYTES # BLD AUTO: 1 X10E3/UL (ref 0.1–0.9)
MONOCYTES NFR BLD AUTO: 8 %
MUCOUS THREADS URNS QL MICRO: PRESENT
NEUTROPHILS # BLD AUTO: 7.7 X10E3/UL (ref 1.4–7)
NEUTROPHILS NFR BLD AUTO: 64 %
NITRITE UR QL STRIP: NEGATIVE
PH UR STRIP: 5.5 [PH] (ref 5–7.5)
PLATELET # BLD AUTO: 198 X10E3/UL (ref 150–450)
POTASSIUM SERPL-SCNC: 4.5 MMOL/L (ref 3.5–5.2)
PROT SERPL-MCNC: 6.6 G/DL (ref 6–8.5)
PROT UR QL STRIP: NEGATIVE
RBC # BLD AUTO: 5.08 X10E6/UL (ref 3.77–5.28)
RBC #/AREA URNS HPF: ABNORMAL /HPF (ref 0–2)
SODIUM SERPL-SCNC: 143 MMOL/L (ref 134–144)
SP GR UR STRIP: 1.01 (ref 1–1.03)
T4 FREE SERPL-MCNC: 1.15 NG/DL (ref 0.82–1.77)
TRIGL SERPL-MCNC: 111 MG/DL (ref 0–149)
TSH SERPL DL<=0.005 MIU/L-ACNC: 4.65 UIU/ML (ref 0.45–4.5)
UNIDENT CRYS URNS QL MICRO: PRESENT
UROBILINOGEN UR STRIP-MCNC: 0.2 MG/DL (ref 0.2–1)
VIT B12 SERPL-MCNC: 1787 PG/ML (ref 232–1245)
VLDLC SERPL CALC-MCNC: 20 MG/DL (ref 5–40)
WBC # BLD AUTO: 12 X10E3/UL (ref 3.4–10.8)
WBC #/AREA URNS HPF: ABNORMAL /HPF (ref 0–5)

## 2025-03-28 RX ORDER — MAGNESIUM 200 MG
TABLET ORAL
Qty: 90 TABLET | Refills: 3 | Status: SHIPPED | OUTPATIENT
Start: 2025-03-28

## 2025-04-17 DIAGNOSIS — R73.01 IFG (IMPAIRED FASTING GLUCOSE): ICD-10-CM

## 2025-04-17 DIAGNOSIS — F32.5 MAJOR DEPRESSION IN REMISSION: Primary | ICD-10-CM

## 2025-04-17 DIAGNOSIS — E78.2 MIXED HYPERLIPIDEMIA: ICD-10-CM

## 2025-04-17 DIAGNOSIS — R29.6 FALLS FREQUENTLY: ICD-10-CM

## 2025-04-17 DIAGNOSIS — I48.0 PAF (PAROXYSMAL ATRIAL FIBRILLATION): ICD-10-CM

## 2025-04-17 DIAGNOSIS — D72.829 LEUKOCYTOSIS, UNSPECIFIED TYPE: ICD-10-CM

## 2025-04-17 DIAGNOSIS — E53.8 LOW FOLIC ACID: ICD-10-CM

## 2025-04-17 DIAGNOSIS — D63.8 ANEMIA OF CHRONIC DISEASE: ICD-10-CM

## 2025-04-17 DIAGNOSIS — M79.7 FIBROMYALGIA: ICD-10-CM

## 2025-04-17 DIAGNOSIS — N18.31 STAGE 3A CHRONIC KIDNEY DISEASE: ICD-10-CM

## 2025-04-17 DIAGNOSIS — E53.8 B12 DEFICIENCY: ICD-10-CM

## 2025-04-18 ENCOUNTER — REFERRAL TRIAGE (OUTPATIENT)
Dept: CASE MANAGEMENT | Facility: OTHER | Age: 80
End: 2025-04-18
Payer: MEDICARE

## 2025-04-21 ENCOUNTER — PATIENT OUTREACH (OUTPATIENT)
Dept: CASE MANAGEMENT | Facility: OTHER | Age: 80
End: 2025-04-21
Payer: MEDICARE

## 2025-04-21 ENCOUNTER — TELEPHONE (OUTPATIENT)
Dept: CASE MANAGEMENT | Facility: OTHER | Age: 80
End: 2025-04-21
Payer: MEDICARE

## 2025-04-21 DIAGNOSIS — U07.1 COVID-19 VIRUS INFECTION: ICD-10-CM

## 2025-04-21 DIAGNOSIS — F41.9 ANXIETY: ICD-10-CM

## 2025-04-21 DIAGNOSIS — F33.0 MILD EPISODE OF RECURRENT MAJOR DEPRESSIVE DISORDER: Primary | ICD-10-CM

## 2025-04-21 DIAGNOSIS — E78.2 MIXED HYPERLIPIDEMIA: ICD-10-CM

## 2025-04-21 DIAGNOSIS — D63.8 ANEMIA OF CHRONIC DISEASE: ICD-10-CM

## 2025-04-21 DIAGNOSIS — F33.41 RECURRENT MAJOR DEPRESSIVE DISORDER, IN PARTIAL REMISSION: Primary | ICD-10-CM

## 2025-04-21 DIAGNOSIS — E53.8 B12 DEFICIENCY: ICD-10-CM

## 2025-04-21 NOTE — PLAN OF CARE
Problem: Fall Risk  Goal: Prevent Falls and Injury  Outcome: Not Progressing  Goal: Optimal Care Coordination of a Patient Experiencing Fall Risk  Outcome: Not Progressing     Problem: General Plan of Care  Goal: Make and Keep All Appointments  Outcome: Not Progressing  Goal: Find Help in My Community  Outcome: Not Progressing  Goal: Protect My Health  Outcome: Not Progressing  Goal: Manage My Emotions  Outcome: Not Progressing  Goal: Maintain My Quality of Life  Outcome: Not Progressing  Goal: Optimal Care Coordination  Outcome: Not Progressing

## 2025-04-21 NOTE — OUTREACH NOTE
AMBULATORY CASE MANAGEMENT NOTE    Names and Relationships of Patient/Support Persons: Contact: Marilee Washington; Relationship: Child -     Adult Patient Profile  Questions/Answers      Flowsheet Row Most Recent Value   Symptoms/Conditions Managed at Home behavioral health   Additional Documentation Behavioral Health Symptoms/Conditions Management (Group)   Behavioral Health Symptoms/Conditions other (see comments)  [grief]   Behavioral Management Strategies activity, adequate rest, community resources, complementary therapy(ies), support system, support group, optimal nutrition intake   Behavioral Health Self-Management Outcome unsure   Behavioral Health Comment Patient has experienced recent loss of spouse in December. Patient then was admitted to hospital for COVID. Patient was moved in with daughter and patient is not eating appropriately, staying well hydrated and sleeps most of the day.  Often experiencing dizziness and increased tiredness. Looking for hospice or in home assistance. PT/OT/SN being requested to assess.        CCM Interim Update    Received call from patient daughter today.     States that patient has recently lost spouse in Dec.    Continues that she is concerned for patient safety and would like to have some in home assistance.  Further stating that she believes that patient will not do well in assisted living reiterating patient weakness and desire to sleep all day. Referral placed by PCP for ACM assistance.    Care Coordination    [facility] was able to introduce self and purpose for original outreach.  Chart review was completed. [facility] was able to explain AC assistance and benefits of service. - Daughter is agreeable to assistance.    [facility] educate, evaluate and treat. Daughter further asking for Hospice if HH is unable to assist patient.    [facility] reviewed insurance with patient daughter and awaiting return call/ email form .    No further assistance at this time.  Next outreach has been scheduled.        Education Documentation  No documentation found.        Susu PIZAROR  Ambulatory Case Management    4/21/2025, 14:29 EDT

## 2025-04-21 NOTE — LETTER
[facility] CASE [facility] CASE MANAGEMENT [place]  [address] [facility] [address]          Mrs. Mueller,    This letter is to notify you that a referral was placed on your behalf   by your PCP to Chronic Care Nurse Case Management.    Our office has attempted to contact you several times without success.  A Nurse  has been assigned to your case and would like to discuss how we may further assist you with your chronic disease management.    If you are interested in this service and would like to explore how CCM can assist you manage your chronic disease, please contact office at your earliest convenience.    We look forward to speaking with you and hope you are well.    Thank you for allowing us to further assist you with your healthcare needs.                Sincerely,    Susu ARORA RN [facility]  [phone]

## 2025-04-21 NOTE — TELEPHONE ENCOUNTER
Placed call to patient this morning, both mobile and recommended lines were no longer in service. We will reach out to office staff regarding contact. Patient referral was placed to CCM by PCP with daughter.  Letter sent to address.

## 2025-04-22 ENCOUNTER — PATIENT OUTREACH (OUTPATIENT)
Dept: CASE MANAGEMENT | Facility: OTHER | Age: 80
End: 2025-04-22
Payer: MEDICARE

## 2025-04-22 DIAGNOSIS — F33.41 RECURRENT MAJOR DEPRESSIVE DISORDER, IN PARTIAL REMISSION: ICD-10-CM

## 2025-04-22 DIAGNOSIS — F41.9 ANXIETY: Primary | ICD-10-CM

## 2025-04-22 DIAGNOSIS — U07.1 COVID-19 VIRUS INFECTION: ICD-10-CM

## 2025-04-22 DIAGNOSIS — F33.0 MILD EPISODE OF RECURRENT MAJOR DEPRESSIVE DISORDER: ICD-10-CM

## 2025-04-22 NOTE — OUTREACH NOTE
AMBULATORY CASE MANAGEMENT NOTE    Names and Relationships of Patient/Support Persons: Contact: Glenys Washington; Relationship: Child -     Patient Outreach    Received call from patient daughter this morning.    Requesting review of next steps.      Daughter verbalizing personal concerns for patient wellbeing and needs.    Care Coordination    AC was able to provide active listening, reaffirmation of HH next steps and validation of daughters concerns and feelings also provided during telephonic communication.    AC did provide daughter with next steps beginning with [facility].  Advising daughter to check into patient [employer] Insurance, in case of need. - Daughter agreed and expressed gratitude for [facility] assistance.    Next outreach has been scheduled. No further needs at this time.    Education Documentation  No documentation found.        Susu PIZARRO  Ambulatory Case Management    4/22/2025, 09:41 EDT

## 2025-04-23 ENCOUNTER — TELEPHONE (OUTPATIENT)
Dept: FAMILY MEDICINE CLINIC | Facility: CLINIC | Age: 80
End: 2025-04-23
Payer: MEDICARE

## 2025-04-23 ENCOUNTER — PATIENT OUTREACH (OUTPATIENT)
Dept: CASE MANAGEMENT | Facility: OTHER | Age: 80
End: 2025-04-23
Payer: MEDICARE

## 2025-04-23 ENCOUNTER — TELEPHONE (OUTPATIENT)
Dept: CASE MANAGEMENT | Facility: OTHER | Age: 80
End: 2025-04-23
Payer: MEDICARE

## 2025-04-23 DIAGNOSIS — F41.9 ANXIETY: ICD-10-CM

## 2025-04-23 DIAGNOSIS — F33.41 RECURRENT MAJOR DEPRESSIVE DISORDER, IN PARTIAL REMISSION: Primary | ICD-10-CM

## 2025-04-23 NOTE — TELEPHONE ENCOUNTER
[facility] regarding initial patient assessment. Requesting VO for PT 1x/week for 3 weeks.

## 2025-04-23 NOTE — TELEPHONE ENCOUNTER
Nanci from [facility] called to request verbal orders for Skilled nursing for 9 weeks, one visit each week. Nanci can be reached at [phone].

## 2025-04-23 NOTE — TELEPHONE ENCOUNTER
Received call from Nick with [facility].  He stated that he was with patient today and did an outpatient therapy eval.  He would like some orders to do therapy once a week for 3 weeks.  He stated that patient was in Afib, heart rate was ranging from 112-136.  Patient is having severe depression at this time.  He stated that patient has not bathed in almost a year and there was a horrible odor as soon as he opened the door.  He would like a call back on his cell phone at [phone]

## 2025-04-24 NOTE — TELEPHONE ENCOUNTER
Spoke with Nanci from [facility] to give verbal orders for skilled nursing for 9 weeks 1 visit each week.

## 2025-04-24 NOTE — OUTREACH NOTE
"AMBULATORY CASE MANAGEMENT NOTE    Names and Relationships of Patient/Support Persons: Contact: Nick AVALOS; Relationship: Other -     [facility] Interim Update    Received call from OT Nick AVALOS, regarding patient assessment.    States that patient is severely depressed. Patient stated to Nick that she has not showered in almost a year, Hair was matted and disheveled and a strong body odor was present upon initial assessment. Daughter verbalizing this has been continuous since patient spouse passed away.    Nick states daughter verbalizing that patient only wears a night gown and sleeps about 95% of the day. States that patient responded to questions appropriately, would perform ADL's or standing and walking only if no one assisted, otherwise verbalizing \"I can't do it.\"    Nick requesting V.O. for OT 1x/ week for 3 weeks. V.O. provided. Further requesting ok to bring Social Work and ST to complete a thorough Neuro Cognitive assessment.     Nick does state that daughter has already purchased shower bench and shower rail.         Education Documentation  No documentation found.        Susu PIZARRO  Ambulatory Case Management    4/24/2025, 09:17 EDT  "

## 2025-04-25 ENCOUNTER — TELEPHONE (OUTPATIENT)
Dept: CASE MANAGEMENT | Facility: OTHER | Age: 80
End: 2025-04-25
Payer: MEDICARE

## 2025-04-25 ENCOUNTER — TELEPHONE (OUTPATIENT)
Dept: FAMILY MEDICINE CLINIC | Facility: CLINIC | Age: 80
End: 2025-04-25
Payer: MEDICARE

## 2025-04-25 NOTE — TELEPHONE ENCOUNTER
Hub to relay  Left Nick with [facility] a voicemail to give the verbal okay per the on-call provider due to patient provider being out of the office. I also attempted to contact the patient her number is not in service to inform her to was in Afib and having severe depression, so she needs to go to the emergency department.

## 2025-04-25 NOTE — TELEPHONE ENCOUNTER
Verbal order requested to visit patient in home for evaluation.     Per PCP, ok to begin in home evaluation on week of 4/28/25.    ACM providing verbal order.

## 2025-04-25 NOTE — TELEPHONE ENCOUNTER
Caller: [facility]    Relationship: Other    Best call back number: [phone]     What orders are you requesting (i.e. lab or imaging):  EVALUATION    In what timeframe would the patient need to come in: WEEK OF 04-    Additional notes: PAWEL STATED SHE WAS UNABLE TO VISIT THE PATIENT THIS WEEK, AND WILL NEED NEW ORDERS TO VISIT THE PATIENT THE WEEK OF 04-.    PLEASE CALL TO GIVE VERBAL ORDERS.    PAWEL STATED HER VOICEMAIL IS CONFIDENTIAL, PLEASE LEAVE DETAILED INFORMATION IF A VOICEMAIL IS LEFT, INCLUDING CALLERS NAME AND PATIENTS NAME.

## 2025-04-29 ENCOUNTER — PATIENT OUTREACH (OUTPATIENT)
Dept: CASE MANAGEMENT | Facility: OTHER | Age: 80
End: 2025-04-29
Payer: MEDICARE

## 2025-04-29 DIAGNOSIS — F33.0 MILD EPISODE OF RECURRENT MAJOR DEPRESSIVE DISORDER: ICD-10-CM

## 2025-04-29 DIAGNOSIS — F33.41 RECURRENT MAJOR DEPRESSIVE DISORDER, IN PARTIAL REMISSION: Primary | ICD-10-CM

## 2025-04-29 DIAGNOSIS — F41.9 ANXIETY: ICD-10-CM

## 2025-04-29 NOTE — OUTREACH NOTE
[facility] End of Month Documentation    This Chronic Medical Management Care Plan for Miguelina Mueller, 80 y.o. female, has been monitored and managed; reviewed and a new plan of care implemented for the month of April.  A cumulative time of 34  minutes was spent on this patient record this month, including chart review; phone call with relative, in home needs and assistance.    Regarding the patient's problems: has Anxiety; Mild episode of recurrent major depressive disorder; Fibromyalgia; Hyperlipidemia; Benign essential HTN; IFG (impaired fasting glucose); Renal insufficiency; Shoulder bursitis; Seasonal allergic rhinitis; PAF (paroxysmal atrial fibrillation); Vitamin D deficiency; B12 deficiency; Low folic acid; Anemia of chronic disease; and Recurrent major depressive disorder, in partial remission on their problem list., the following items were addressed: medical records; medications; referrals to community service providers, Safe care management/ falls prevention and any changes can be found within the plan section of the note.  A detailed listing of time spent for chronic care management is tracked within each outreach encounter.  Current medications include:  has a current medication list which includes the following prescription(s): cholecalciferol, b-12, duloxetine, folic acid, gabapentin, rosuvastatin, tramadol, and xarelto. and the patient is reported to be patient is compliant with medication protocol, possible depression (grief and loss) Weakness (COVID infection),  Medications are reported to be No data recorded.  Regarding these diagnoses, referrals were made to the following provider(s):  Home Health Evaluation/ Social Work for mental health evaluation.  All notes on chart for PCP to review.    The patient was monitored remotely for mood & behavior; activity level; weight, monitor weakness, participation in ADL and hydration and meal intake.    The patient's physical needs include:  physical healthcare,  weakness, tiredness, sleeps most of the day.     The patient's mental support needs include:  not applicable    The patient's cognitive support needs include:  not applicable    The patient's psychosocial support needs include:  need for increased support; coordination of community providers, grief and personal loss    The patient's functional needs include: physical healthcare, weak, dizziness    The patient's environmental needs include:  not applicable, na    Care Plan overall comments:  No data recorded    Refer to previous outreach notes for more information on the areas listed above.    Monthly Billing Diagnoses  (F33.41) Recurrent major depressive disorder, in partial remission    (F41.9) Anxiety    (F33.0) Mild episode of recurrent major depressive disorder    Medications   Medications have been reconciled    Care Plan progress this month:      Recently Modified Care Plans Updates made since 3/29/2025 12:00 AM      No recently modified care plans.             Fall Risk       Prevent Falls and Injury (Not Progressing)       Start:  04/21/25    Expected End:  07/21/25         My Fall Prevention To Do List       Start:  04/21/25       Why is this important?Most falls happen when it is hard for you to walk safely. Your balance may be off because of an illness. You may have pain in your knees, hip or other joints.You may be overly tired or taking medicines that make you sleepy. You may not be able to see or hear clearly.Falls can lead to broken bones, bruises or other injuries.There are things you can do to help prevent falling.            Optimal Care Coordination of a Patient Experiencing Fall Risk (Not Progressing)       Start:  04/21/25    Expected End:  07/21/25         Identify and Manage Contributors to Fall Risk       Start:  04/21/25              General Plan of Care       Make and Keep All Appointments (Not Progressing)       Start:  04/21/25    Expected End:  07/21/25         My Appointments To Do List        Start:  04/21/25       Why is this important?Part of staying healthy is seeing the doctor for follow-up care.If you forget your appointments, there are some things you can do to stay on track.            Find Help in My Community (Not Progressing)       Start:  04/21/25    Expected End:  07/21/25         My Community Help To Do List       Start:  04/21/25       Why is this important?Knowing how and where to find help for yourself or family in your neighborhood and community is an important skill.You will want to take some steps to learn how.            Protect My Health (Not Progressing)       Start:  04/21/25    Expected End:  07/21/25         My Health Protection To Do List       Start:  04/21/25       Why is this important?Screening tests can find diseases early when they are easier to treat.Your doctor or nurse will talk with you about which tests are important for you.Getting shots for common diseases like the flu and shingles will help prevent them.            Manage My Emotions (Not Progressing)       Start:  04/21/25    Expected End:  07/21/25         My Emotion Management To Do List       Start:  04/21/25       Why is this important?When you are stressed, down or upset, your body reacts too.For example, your blood pressure may get higher; you may have a headache or stomachache.When your emotions get the best of you, your body's ability to fight off cold and flu gets weak.These steps will help you manage your emotions.            Maintain My Quality of Life (Not Progressing)       Start:  04/21/25    Expected End:  07/21/25         My Quality of Life To Do List       Start:  04/21/25       Why is this important?Having a long-term illness can be scary.It can also be stressful for you and your caregiver.These steps may help.            Optimal Care Coordination (Not Progressing)       Start:  04/21/25    Expected End:  07/21/25         Develop Relationship to Effect Behavior Change       Start:  04/21/25             Mutually Develop and Foster Achievement of Patient Goals       Start:  04/21/25            Support Psychosocial Response to Risk or Actual Health Condition       Start:  04/21/25            Support and Maintain Acceptable Degree of Health, Comfort and Happiness       Start:  04/21/25              Instructions   Patient was provided an electronic copy of care plan  CCM services were explained and offered and patient has accepted these services.  Patient has given their written consent to receive CCM services and understands that this includes the authorization of electronic communication of medical information with the other treating providers.  Patient understands that they may stop CCM services at any time and these changes will be effective at the end of the calendar month and will effectively revocate the agreement of CCM services.  Patient understands that only one practitioner can furnish and be paid for CCM services during one calendar month.  Patient also understands that there may be co-payment or deductible fees in association with CCM services.  Patient will continue with at least monthly follow-up calls with the Ambulatory .    Susu PIZARRO  Ambulatory Case Management    4/29/2025, 09:52 EDT

## 2025-05-06 ENCOUNTER — TELEPHONE (OUTPATIENT)
Dept: FAMILY MEDICINE CLINIC | Facility: CLINIC | Age: 80
End: 2025-05-06
Payer: MEDICARE

## 2025-05-06 NOTE — TELEPHONE ENCOUNTER
HUB TO RELAY  Left Brinda voicemail to inform him that per patient provider patient needs to go to the ER to be seen. Unable to reach patient to advise to go to the ER bout phone numbers are disconnected.

## 2025-05-06 NOTE — TELEPHONE ENCOUNTER
Nick from [facility] called stating that he is at patient home for PT and patient is currently in A-fib B/P 112/93 Heart 136, 148, 152 O2 is 94 he wanted to report to patient provider. Please advise

## 2025-05-06 NOTE — TELEPHONE ENCOUNTER
Name: Miguelina Mueller    Relationship: Self    Best Callback Number: [phone]    HUB PROVIDED THE RELAY MESSAGE FROM THE OFFICE   PATIENT VOICED UNDERSTANDING AND HAS NO FURTHER QUESTIONS AT THIS TIME    ADDITIONAL INFORMATION:

## 2025-05-23 ENCOUNTER — PATIENT OUTREACH (OUTPATIENT)
Dept: CASE MANAGEMENT | Facility: OTHER | Age: 80
End: 2025-05-23
Payer: MEDICARE

## 2025-05-23 DIAGNOSIS — F33.41 RECURRENT MAJOR DEPRESSIVE DISORDER, IN PARTIAL REMISSION: Primary | ICD-10-CM

## 2025-05-23 DIAGNOSIS — F33.0 MILD EPISODE OF RECURRENT MAJOR DEPRESSIVE DISORDER: ICD-10-CM

## 2025-05-23 DIAGNOSIS — F41.9 ANXIETY: ICD-10-CM

## 2025-05-23 NOTE — OUTREACH NOTE
"AMBULATORY CASE MANAGEMENT NOTE    Names and Relationships of Patient/Support Persons: Contact: Deann Washington \"Marilee\"; Relationship: Child -     Advance Care Planning   ACP discussion was held with the patient during this visit.     [facility] Interim Update    Spoke with patient daughter, and emergency contact Marilee Washington today.     States that PT has stopped for now, continued that she has not heard from [facility] since they canceled the last two visits.    States that they are seeing Psychiatric APRN in home.  Daughter continues that patient is doing well and agreeable to ACM recommendation to continue HH meeting patient where she is at to provide the best care which helps patient meet goals.    Daughter states that she is back to work and believes that patient is receiving what she needs for now. Agreeable to continue monitoring and contact ACM with further healthcare needs PRN for now.    ACM provided active listening, Chronic Disease recommendations, empathy, encouragement and reassurance.    No further needs at this time. Next monthly outreach has been scheduled.      Education Documentation  No documentation found.        Susu PIZARRO  Ambulatory Case Management    5/23/2025, 14:37 EDT  "

## 2025-05-27 RX ORDER — FOLIC ACID 1 MG/1
1000 TABLET ORAL DAILY
Qty: 90 TABLET | Refills: 3 | Status: SHIPPED | OUTPATIENT
Start: 2025-05-27

## 2025-05-27 NOTE — TELEPHONE ENCOUNTER
Hub can read       Attempted to call patient and both numbers are out of service. Also called emergency contact - unable to vm because Marilee is not on verbal .

## 2025-06-18 ENCOUNTER — TELEPHONE (OUTPATIENT)
Dept: CARDIOLOGY | Age: 80
End: 2025-06-18
Payer: MEDICARE

## 2025-06-18 NOTE — TELEPHONE ENCOUNTER
"Caller: Deann Washington \"Marilee\"    Relationship to patient: Emergency Contact    Best call back number: [phone]    Patient is needing: PT'S DAUGHTER CALLED STATING MOTHER HAD AN APPT W/ DR RUEDA AND WOULD LIKE TO KNOW WHAT WOULD HAVE OCCURED AT THE APPT SO THEY CAN LET THEIR IN-HOME NURSE PRACTITIONER KNOW AND DECIDE IF THEY SHOULD R/S THE APPT OR NOT. PLEASE CALL TO ADVISE.  "

## 2025-06-23 ENCOUNTER — TELEPHONE (OUTPATIENT)
Dept: CASE MANAGEMENT | Facility: OTHER | Age: 80
End: 2025-06-23
Payer: MEDICARE

## 2025-06-25 ENCOUNTER — PATIENT OUTREACH (OUTPATIENT)
Dept: CASE MANAGEMENT | Facility: OTHER | Age: 80
End: 2025-06-25
Payer: MEDICARE

## 2025-06-25 DIAGNOSIS — F41.9 ANXIETY: ICD-10-CM

## 2025-06-25 DIAGNOSIS — F33.41 RECURRENT MAJOR DEPRESSIVE DISORDER, IN PARTIAL REMISSION: Primary | ICD-10-CM

## 2025-06-25 NOTE — OUTREACH NOTE
"AMBULATORY CASE MANAGEMENT NOTE    Names and Relationships of Patient/Support Persons: Contact: Deann Washington \"Marilee\"; Relationship: Emergency Contact -     CCM Interim Update    Received return call from daughter today.     Daughter does possess a POA. ACM did discuss options with daughter, if planning to remain with [facility].    Daughter voices that she has had House Calls group out of [facility] seeing patient d/t patient lack of ability to ambulate or transfer well outside of the home.    Care Coordination    ACM was able to provide active listening, ACP discussion completed with this outreach. ACM was able to provide encouragement, and reassurance during telephonic assessment of needs.    Daughter and ACM completed a collaboration regarding in home needed assistance and LTC insurance availability. Daughter does have family assisting for now. Recommendations discussed regarding costs and different in home personal care providers.     Daughter expressing her gratitude for ACM assistance.     PCP and Clinical Coordinator made aware of patient move from [facility].    Patient will graduate today with goals mets.        Education Documentation  No documentation found.        Susu PIZARRO  Ambulatory Case Management    6/25/2025, 12:05 EDT  "

## 2025-06-25 NOTE — PLAN OF CARE
Problem: Fall Risk  Goal: Prevent Falls and Injury  Outcome: Met  Goal: Optimal Care Coordination of a Patient Experiencing Fall Risk  Outcome: Met     Problem: General Plan of Care  Goal: Make and Keep All Appointments  Outcome: Met  Goal: Find Help in My Community  Outcome: Met  Goal: Protect My Health  Outcome: Met  Goal: Manage My Emotions  Outcome: Met  Goal: Maintain My Quality of Life  Outcome: Met  Goal: Optimal Care Coordination  Outcome: Met

## 2025-06-25 NOTE — TELEPHONE ENCOUNTER
06/25/25  13:04 EDT    I do not know how to answer this question.  It is her routine cardiology appointment that occurs every 6 months to ensure that her atrial fibrillation and other cardiac issues are stable.  She has not been evaluated in greater than 1 year.  She has also not had a EKG in greater than 1 year.      ROXIE Sneed  [facility]

## 2025-07-09 ENCOUNTER — TELEPHONE (OUTPATIENT)
Dept: CARDIOLOGY | Facility: CLINIC | Age: 80
End: 2025-07-09
Payer: MEDICARE

## 2025-07-09 NOTE — TELEPHONE ENCOUNTER
7/9/2025 Called two numbers and left 2 voicemails on the patient's mobile number and the daughter's listed phone number to arrange a 6 month follow-up appointment which has been overdue for a year.    Thanks,  Jacquelyn

## 2025-07-21 ENCOUNTER — TELEPHONE (OUTPATIENT)
Dept: CARDIOLOGY | Facility: CLINIC | Age: 80
End: 2025-07-21
Payer: MEDICARE

## 2025-07-21 NOTE — TELEPHONE ENCOUNTER
7/28/2025 4th call made to Patient's phone number. No way to leave a voicemail. Mailbox asn't set up.  2nd call to Patient's daughterMarilee. A message was left to return the call.  Plus a letter was sent to patient asking to contact [facility].  ALBA Carrizales     7/21/2025 3rd call made to schedule appointment. I called the main number: [phone] listed for the patient.  Jacquelyn Nicholson      7/9/2025  2nd call made to schedule appointment.  I called two numbers and left two voice mails; one on the daughter's number and one on the patient's mobile number to try an arrange an overdue follow-up appointment for the patient.     Jacquelyn Nicholson    6/25/2025 I called and left a voice mail to schedule an appointment. The Last office Visit was 2/20/2024 with Dr Dover.   Jacquelyn Nicholson

## 2025-07-28 NOTE — TELEPHONE ENCOUNTER
PT DAUGHTER QUENTIN CALLED BACK AND SAID PT NOW LIVES WITH HER AND HAS NURSE KULDEEP JORDAN THAT COMES TO HOUSE.  SHE DOES NOT LEAVE HOUSE FOR APPTS.     KULDEEP JORDAN-[facility]  [phone]

## 2025-07-29 ENCOUNTER — TELEPHONE (OUTPATIENT)
Dept: CARDIOLOGY | Facility: CLINIC | Age: 80
End: 2025-07-29
Payer: MEDICARE

## 2025-07-29 NOTE — TELEPHONE ENCOUNTER
7/29/2025 left a voicemail for patient to call back to schedule an Aug 7 appointment with Dr Dover. There was an opening and I thought I could add the patient but no answer.   Thanks,  Jacquelyn

## 2025-08-18 RX ORDER — DULOXETIN HYDROCHLORIDE 60 MG/1
60 CAPSULE, DELAYED RELEASE ORAL DAILY
Qty: 30 CAPSULE | Refills: 11 | Status: SHIPPED | OUTPATIENT
Start: 2025-08-18

## 2025-08-22 ENCOUNTER — TELEPHONE (OUTPATIENT)
Dept: CARDIOLOGY | Facility: CLINIC | Age: 80
End: 2025-08-22
Payer: MEDICARE